# Patient Record
Sex: MALE | Race: WHITE | Employment: FULL TIME | URBAN - METROPOLITAN AREA
[De-identification: names, ages, dates, MRNs, and addresses within clinical notes are randomized per-mention and may not be internally consistent; named-entity substitution may affect disease eponyms.]

---

## 2017-01-24 ENCOUNTER — OFFICE VISIT (OUTPATIENT)
Dept: FAMILY MEDICINE CLINIC | Age: 32
End: 2017-01-24

## 2017-01-24 ENCOUNTER — TELEPHONE (OUTPATIENT)
Dept: FAMILY MEDICINE CLINIC | Age: 32
End: 2017-01-24

## 2017-01-24 VITALS
SYSTOLIC BLOOD PRESSURE: 136 MMHG | HEIGHT: 70 IN | DIASTOLIC BLOOD PRESSURE: 90 MMHG | HEART RATE: 88 BPM | BODY MASS INDEX: 26.74 KG/M2 | OXYGEN SATURATION: 98 % | WEIGHT: 186.8 LBS

## 2017-01-24 DIAGNOSIS — K21.9 GASTROESOPHAGEAL REFLUX DISEASE, ESOPHAGITIS PRESENCE NOT SPECIFIED: ICD-10-CM

## 2017-01-24 DIAGNOSIS — D22.9 ATYPICAL NEVI: ICD-10-CM

## 2017-01-24 DIAGNOSIS — R63.4 WEIGHT LOSS: ICD-10-CM

## 2017-01-24 DIAGNOSIS — F41.9 ANXIETY: Primary | ICD-10-CM

## 2017-01-24 PROCEDURE — 99214 OFFICE O/P EST MOD 30 MIN: CPT | Performed by: FAMILY MEDICINE

## 2017-02-23 ENCOUNTER — OFFICE VISIT (OUTPATIENT)
Dept: FAMILY MEDICINE CLINIC | Age: 32
End: 2017-02-23

## 2017-02-23 VITALS
HEART RATE: 82 BPM | WEIGHT: 179 LBS | OXYGEN SATURATION: 98 % | BODY MASS INDEX: 25.68 KG/M2 | DIASTOLIC BLOOD PRESSURE: 74 MMHG | SYSTOLIC BLOOD PRESSURE: 118 MMHG

## 2017-02-23 DIAGNOSIS — R19.7 DIARRHEA, UNSPECIFIED TYPE: ICD-10-CM

## 2017-02-23 DIAGNOSIS — R00.2 PALPITATIONS: ICD-10-CM

## 2017-02-23 DIAGNOSIS — F41.9 ANXIETY: ICD-10-CM

## 2017-02-23 DIAGNOSIS — K21.9 GASTROESOPHAGEAL REFLUX DISEASE, ESOPHAGITIS PRESENCE NOT SPECIFIED: ICD-10-CM

## 2017-02-23 DIAGNOSIS — F32.89 DEPRESSIVE DISORDER, NOT ELSEWHERE CLASSIFIED: Primary | ICD-10-CM

## 2017-02-23 DIAGNOSIS — F41.0 PANIC DISORDER: ICD-10-CM

## 2017-02-23 PROCEDURE — 99214 OFFICE O/P EST MOD 30 MIN: CPT | Performed by: FAMILY MEDICINE

## 2017-02-23 RX ORDER — HYDROXYZINE HYDROCHLORIDE 25 MG/1
25 TABLET, FILM COATED ORAL 3 TIMES DAILY PRN
Qty: 30 TABLET | Refills: 1 | Status: SHIPPED | OUTPATIENT
Start: 2017-02-23 | End: 2017-08-23

## 2017-02-23 RX ORDER — OMEPRAZOLE 20 MG/1
20 CAPSULE, DELAYED RELEASE ORAL DAILY
Qty: 30 CAPSULE | Refills: 12 | Status: SHIPPED | OUTPATIENT
Start: 2017-02-23 | End: 2017-08-23 | Stop reason: SDUPTHER

## 2017-04-17 ENCOUNTER — TELEPHONE (OUTPATIENT)
Dept: FAMILY MEDICINE CLINIC | Age: 32
End: 2017-04-17

## 2017-05-03 ENCOUNTER — TELEPHONE (OUTPATIENT)
Dept: FAMILY MEDICINE CLINIC | Age: 32
End: 2017-05-03

## 2017-05-03 DIAGNOSIS — Z13.220 SCREENING, LIPID: Primary | ICD-10-CM

## 2017-08-23 ENCOUNTER — OFFICE VISIT (OUTPATIENT)
Dept: FAMILY MEDICINE CLINIC | Age: 32
End: 2017-08-23

## 2017-08-23 VITALS
SYSTOLIC BLOOD PRESSURE: 120 MMHG | DIASTOLIC BLOOD PRESSURE: 70 MMHG | HEART RATE: 75 BPM | BODY MASS INDEX: 19.66 KG/M2 | WEIGHT: 137 LBS | OXYGEN SATURATION: 98 %

## 2017-08-23 DIAGNOSIS — R10.13 EPIGASTRIC PAIN: Primary | ICD-10-CM

## 2017-08-23 DIAGNOSIS — F41.9 ANXIETY: ICD-10-CM

## 2017-08-23 DIAGNOSIS — K21.9 GASTROESOPHAGEAL REFLUX DISEASE WITHOUT ESOPHAGITIS: ICD-10-CM

## 2017-08-23 PROCEDURE — 99214 OFFICE O/P EST MOD 30 MIN: CPT | Performed by: FAMILY MEDICINE

## 2017-08-23 RX ORDER — OMEPRAZOLE 40 MG/1
40 CAPSULE, DELAYED RELEASE ORAL DAILY
Qty: 30 CAPSULE | Refills: 12 | Status: SHIPPED | OUTPATIENT
Start: 2017-08-23 | End: 2017-10-10

## 2017-08-23 RX ORDER — PAROXETINE 10 MG/1
10 TABLET, FILM COATED ORAL DAILY
Qty: 30 TABLET | Refills: 3 | Status: SHIPPED | OUTPATIENT
Start: 2017-08-23 | End: 2017-10-10

## 2017-10-07 LAB
ALBUMIN SERPL-MCNC: 4.3 G/DL (ref 3.5–5.7)
ALP BLD-CCNC: 65 U/L (ref 36–125)
ALT SERPL-CCNC: 15 U/L (ref 7–52)
ANION GAP SERPL CALCULATED.3IONS-SCNC: 8 MMOL/L (ref 3–16)
AST SERPL-CCNC: 16 U/L (ref 13–39)
BASOPHILS ABSOLUTE: 53 /UL (ref 0–200)
BASOPHILS RELATIVE PERCENT: 1 % (ref 0–1)
BILIRUB SERPL-MCNC: 1.1 MG/DL (ref 0–1.5)
BUN BLDV-MCNC: 10 MG/DL (ref 7–25)
CALCIUM SERPL-MCNC: 9.9 MG/DL (ref 8.6–10.3)
CHLORIDE BLD-SCNC: 100 MMOL/L (ref 98–110)
CHOLESTEROL, TOTAL: 147 MG/DL (ref 0–200)
CO2: 32 MMOL/L (ref 21–33)
CREAT SERPL-MCNC: 0.94 MG/DL (ref 0.6–1.3)
EOSINOPHILS ABSOLUTE: 85 /UL (ref 15–500)
EOSINOPHILS RELATIVE PERCENT: 1.6 % (ref 0–8)
GFR, ESTIMATED: >90 SEE NOTE.
GFR, ESTIMATED: >90 SEE NOTE.
GLUCOSE BLD-MCNC: 87 MG/DL (ref 70–100)
HCT VFR BLD CALC: 43.8 % (ref 38.5–50)
HDLC SERPL-MCNC: 37 MG/DL (ref 60–92)
HEMOGLOBIN: 14.8 G/DL (ref 13.2–17.1)
LDL CHOLESTEROL CALCULATED: 85 MG/DL
LYMPHOCYTES ABSOLUTE: 1807 /UL (ref 850–3900)
LYMPHOCYTES RELATIVE PERCENT: 34.1 % (ref 15–45)
MCH RBC QN AUTO: 29.4 PG (ref 27–33)
MCHC RBC AUTO-ENTMCNC: 33.9 G/DL (ref 32–36)
MCV RBC AUTO: 86.7 FL (ref 80–100)
MONOCYTES ABSOLUTE: 419 /UL (ref 200–950)
MONOCYTES RELATIVE PERCENT: 7.9 % (ref 0–12)
NEUTROPHILS ABSOLUTE: 2936 /UL (ref 1500–7800)
NUCLEATED RED BLOOD CELLS: 0 /100 WBC (ref 0–0)
OSMOLALITY CALCULATION: 288 MOSM/KG (ref 278–305)
PDW BLD-RTO: 14.4 % (ref 11–15)
PLATELET # BLD: 199 10E3/UL (ref 140–400)
PMV BLD AUTO: 7.5 FL (ref 7.5–11.5)
POTASSIUM SERPL-SCNC: 4.3 MMOL/L (ref 3.5–5.3)
RBC # BLD: 5.05 10E6/UL (ref 4.2–5.8)
SEGMENTED NEUTROPHILS RELATIVE PERCENT: 55.4 % (ref 40–80)
SODIUM BLD-SCNC: 140 MMOL/L (ref 133–146)
TOTAL PROTEIN: 7.3 G/DL (ref 6.4–8.9)
TRIGL SERPL-MCNC: 123 MG/DL (ref 10–149)
TSH, 3RD GENERATION: 1.81 UIU/ML (ref 0.34–5.6)
WBC # BLD: 5.3 10E3/UL (ref 3.8–10.8)

## 2017-10-09 LAB
ENDOMYSIAL IGA ANTIBODY: NEGATIVE
GLIADIN ANTIBODIES IGA: 5 UNITS (ref 0–19)
GLIADIN ANTIBODIES IGG: 2 UNITS (ref 0–19)
IMMUNOGLOBULIN A, SERUM: 419 MG/DL (ref 82–453)
TRANSGLUTAMINASE IGA: <2 U/ML (ref 0–3)

## 2017-10-10 ENCOUNTER — OFFICE VISIT (OUTPATIENT)
Dept: FAMILY MEDICINE CLINIC | Age: 32
End: 2017-10-10

## 2017-10-10 VITALS
HEART RATE: 69 BPM | DIASTOLIC BLOOD PRESSURE: 76 MMHG | BODY MASS INDEX: 20.66 KG/M2 | WEIGHT: 144 LBS | OXYGEN SATURATION: 98 % | SYSTOLIC BLOOD PRESSURE: 120 MMHG

## 2017-10-10 DIAGNOSIS — F41.9 ANXIETY: Primary | ICD-10-CM

## 2017-10-10 DIAGNOSIS — K21.9 GASTROESOPHAGEAL REFLUX DISEASE WITHOUT ESOPHAGITIS: ICD-10-CM

## 2017-10-10 PROCEDURE — 99213 OFFICE O/P EST LOW 20 MIN: CPT | Performed by: FAMILY MEDICINE

## 2017-10-10 RX ORDER — OMEPRAZOLE 40 MG/1
40 CAPSULE, DELAYED RELEASE ORAL DAILY
Qty: 30 CAPSULE | Refills: 12
Start: 2017-10-10 | End: 2018-08-31 | Stop reason: SDUPTHER

## 2017-10-10 ASSESSMENT — PATIENT HEALTH QUESTIONNAIRE - PHQ9
SUM OF ALL RESPONSES TO PHQ9 QUESTIONS 1 & 2: 2
1. LITTLE INTEREST OR PLEASURE IN DOING THINGS: 1
SUM OF ALL RESPONSES TO PHQ QUESTIONS 1-9: 2
2. FEELING DOWN, DEPRESSED OR HOPELESS: 1

## 2017-10-10 NOTE — PROGRESS NOTES
Subjective:      Patient ID: Omar Peres is a 27 y.o. male. HPI patient presents today for his 1 month follow up on medications. Still taking omeprazole, but he is not sure if it's working or his duodenitis is just improving. Only has stomach discomfort once or twice a week. Slowly figuring out what foods aggravate him - he avoids greasy/too spicy foods. Had a cupcake recently that did not bother him. His appetite has improved. Says bowels are okay, no diarrhea/constipation recently. He wants to know if there are specific probiotics we could recommend. Got paxil filled in August, but never started taking it. He says he is nervous about taking it after reading an article that it can cause or worsen duodenitis so would like to discuss that possibility today. His sister has been on sertraline, he would prefer starting that because she has been doing well on it. He does report regular anxiety/agitation but it hasn't been bugging him more than usual. No new stressors, still worried about bills but feels less worried about his health as he has learned more about it. Review of Systems    Objective:   Physical Exam   Psychiatric: His speech is normal and behavior is normal. Judgment and thought content normal. His mood appears anxious. Cognition and memory are normal.     Body mass index is 20.66 kg/m². Vitals:    10/10/17 1124   BP: 120/76   Site: Left Arm   Position: Sitting   Cuff Size: Large Adult   Pulse: 69   SpO2: 98%   Weight: 144 lb (65.3 kg)     Wt Readings from Last 3 Encounters:   10/10/17 144 lb (65.3 kg)   08/23/17 137 lb (62.1 kg)   02/23/17 179 lb (81.2 kg)       GENERAL:Alert and oriented x 4 NAD, normal appearing weight, well hydrated, well developed. PHQ-9 Total Score: 2 (10/10/2017 11:27 AM)        Assessment:       Mathieu Tucker was seen today for follow-up. Diagnoses and all orders for this visit:    Anxiety  -     sertraline (ZOLOFT) 50 MG tablet;  Take 1 tablet by mouth daily  Start sertraline  RTo 1 month     Gastroesophageal reflux disease without esophagitis  -     omeprazole (PRILOSEC) 40 MG delayed release capsule; Take 1 capsule by mouth daily  -Stable, continue current medications. Discussed probiotics, trial one for 3-4 weeks and if not helpful can try a dif one. Discussed sometimes needs to try dif ones before finding one that works well.     Note per Jax Joy, MS3 student with corrections and edits per Neftaly Valdivia MD.  I agree with entirety of note and was present and performed history and physical.

## 2017-10-10 NOTE — PROGRESS NOTES
Subjective:      Patient ID: Donn Montoya is a 27 y.o. male. HPI patient presents today for his 1 month follow up on medications.     Review of Systems    Objective:   Physical Exam    Assessment:      ***      Plan:      ***

## 2018-01-10 ENCOUNTER — OFFICE VISIT (OUTPATIENT)
Dept: FAMILY MEDICINE CLINIC | Age: 33
End: 2018-01-10

## 2018-01-10 VITALS
SYSTOLIC BLOOD PRESSURE: 116 MMHG | WEIGHT: 150 LBS | BODY MASS INDEX: 21.52 KG/M2 | HEART RATE: 79 BPM | DIASTOLIC BLOOD PRESSURE: 78 MMHG | OXYGEN SATURATION: 98 %

## 2018-01-10 DIAGNOSIS — K21.9 GASTROESOPHAGEAL REFLUX DISEASE WITHOUT ESOPHAGITIS: Primary | ICD-10-CM

## 2018-01-10 DIAGNOSIS — F41.9 ANXIETY: ICD-10-CM

## 2018-01-10 PROCEDURE — 99213 OFFICE O/P EST LOW 20 MIN: CPT | Performed by: FAMILY MEDICINE

## 2018-07-07 ENCOUNTER — OFFICE VISIT (OUTPATIENT)
Dept: FAMILY MEDICINE CLINIC | Age: 33
End: 2018-07-07

## 2018-07-07 VITALS
BODY MASS INDEX: 21.78 KG/M2 | SYSTOLIC BLOOD PRESSURE: 102 MMHG | DIASTOLIC BLOOD PRESSURE: 70 MMHG | WEIGHT: 151.8 LBS | TEMPERATURE: 98.3 F

## 2018-07-07 DIAGNOSIS — L02.92 BOIL: Primary | ICD-10-CM

## 2018-07-07 DIAGNOSIS — F41.9 ANXIETY: ICD-10-CM

## 2018-07-07 PROCEDURE — 99213 OFFICE O/P EST LOW 20 MIN: CPT | Performed by: FAMILY MEDICINE

## 2018-07-07 RX ORDER — CLINDAMYCIN HYDROCHLORIDE 150 MG/1
150 CAPSULE ORAL 3 TIMES DAILY
Qty: 21 CAPSULE | Refills: 0 | Status: SHIPPED | OUTPATIENT
Start: 2018-07-07 | End: 2018-07-14

## 2018-07-07 NOTE — PROGRESS NOTES
Subjective:      Patient ID: Yessenia Aggarwal is a 26 y.o. male. Patient presents for acute medical problem-infection on back . Medical assistant notes reviewed. HPI Patient has a cyst on his back that has been there for a long time. Patient states the area is sensitive to touch, red, swollen, and has some green/yellowish colored discharge coming from it for the past week. Patient has significant anxiety about taking medications in general.    Review of Systems     Allergies   Allergen Reactions    Amoxicillin-Pot Clavulanate     Citalopram      Jittery, sweating, felt \"weird\"    Midrin [Apap-Isometheptene-Dichloral]     Penicillins          Objective:   Physical Exam   Constitutional: He appears well-developed and well-nourished. No distress. Neurological: He is alert. Skin:   Left mid back with 3 x 4 cm carbuncle that is already having purulent drainage. New dressing was applied to the affected area       Assessment:      Dylon Eli was seen today for mass. Diagnoses and all orders for this visit:    Boil    Anxiety    Other orders  -     clindamycin (CLEOCIN) 150 MG capsule;  Take 1 capsule by mouth 3 times daily for 7 days            Plan:      Warm compress to the affected area 3 times a day with dressing  Discussed surgical excision of the area in the next 4-6 weeks and patient is to call back for appointment with general surgeon  Informational handout provided  RTC PRN

## 2018-07-17 ENCOUNTER — OFFICE VISIT (OUTPATIENT)
Dept: FAMILY MEDICINE CLINIC | Age: 33
End: 2018-07-17

## 2018-07-17 VITALS
HEART RATE: 67 BPM | BODY MASS INDEX: 22.46 KG/M2 | DIASTOLIC BLOOD PRESSURE: 78 MMHG | WEIGHT: 148.2 LBS | HEIGHT: 68 IN | SYSTOLIC BLOOD PRESSURE: 110 MMHG | OXYGEN SATURATION: 98 %

## 2018-07-17 DIAGNOSIS — L02.212 ABSCESS OF BACK: ICD-10-CM

## 2018-07-17 DIAGNOSIS — F41.9 ANXIETY: Primary | ICD-10-CM

## 2018-07-17 DIAGNOSIS — K58.9 IRRITABLE BOWEL SYNDROME, UNSPECIFIED TYPE: ICD-10-CM

## 2018-07-17 PROCEDURE — 99214 OFFICE O/P EST MOD 30 MIN: CPT | Performed by: FAMILY MEDICINE

## 2018-07-17 NOTE — PATIENT INSTRUCTIONS
Medications are not the best treatments for insomnia and can have many side effects including sleep walking/eating/driving, daytime sleepiness, addiction and dependence, increased risk of falls and increased risk of dementia. Mindfulness and Meditation have been shown to be more effective than other treatments for insomnia, including medication. It also improves mood symptoms like anxiety and depression. Here are some websites to help you get started with a natural way to fall asleep and feel better! Http://[employer]ath[employer]. lucierna    Https://HiptypeuseFreeATM. lucierna  (free 8 week course)    Www.Pharminex. lucierna  (also has a smartphone meg)    Www.Ahead. lucierna  (also has a smartphone meg)      Dr. Ag Fontanez in [address] for Insomnia  [facility]. [address]  [place], [address] [place] [address]  [phone]    [address] [place] [address] for [address]  [phone]

## 2018-07-17 NOTE — PROGRESS NOTES
Subjective:      Patient ID: Alofnso Moreno is a 26 y.o. male. HPI Patient presents today for stomach pain that comes and goes. States stomach was sensitive afterwards. States  symptoms are currently not bothering him, would like to see what he can do to prevent them in the future. Here with stomach pain and anxiety. Also would like cyst on back rechecked. Here with stomach pain.  few weeks ago had sudden severe pain in epigastric, felt like got punched, lasted few sec and then gone and fine since.  had eaten more tomatoes on his chipolte than normal and knows that flares up his stomach sx but was concerned as the pain was not like normal flare ups. Has not had any since. Also concerned about orgasm, states had few episodes a week ago where orgasm would build but even after climax would continue to build where normally would gradually go away after climax was just continue to build and then abruptly went away. Was able to reach climax and erection went away. Just concerned as it wasn't like his normal orgasm. Also wants to recheck the cyst on back, states keeps draining. Never took abx as doesn't like to take medication if doesn't have to but wants to make sure it is getting better. Review of Systems    Objective:   Physical Exam  Vitals:    07/17/18 0936   BP: 110/78   Site: Left Arm   Position: Sitting   Cuff Size: Small Adult   Pulse: 67   SpO2: 98%   Weight: 148 lb 3.2 oz (67.2 kg)   Height: 5' 8.25\" (1.734 m)     Wt Readings from Last 3 Encounters:   07/17/18 148 lb 3.2 oz (67.2 kg)   07/07/18 151 lb 12.8 oz (68.9 kg)   01/10/18 150 lb (68 kg)     Body mass index is 22.37 kg/m². Alert and oriented x 4 NAD, normal appearing weight, well hydrated, well developed. Mid back with large soft area with opening at bottom, able to express sig amount of purulent material and some bloody drainage. Dressed with tegaderm and gauze. Mild surrounding erythema.       Assessment:        Ibis Knapp

## 2018-08-31 ENCOUNTER — OFFICE VISIT (OUTPATIENT)
Dept: FAMILY MEDICINE CLINIC | Age: 33
End: 2018-08-31

## 2018-08-31 VITALS
DIASTOLIC BLOOD PRESSURE: 80 MMHG | RESPIRATION RATE: 12 BRPM | SYSTOLIC BLOOD PRESSURE: 120 MMHG | WEIGHT: 149.5 LBS | HEART RATE: 78 BPM | BODY MASS INDEX: 22.57 KG/M2

## 2018-08-31 DIAGNOSIS — K58.9 IRRITABLE BOWEL SYNDROME, UNSPECIFIED TYPE: ICD-10-CM

## 2018-08-31 DIAGNOSIS — F41.0 PANIC DISORDER: ICD-10-CM

## 2018-08-31 DIAGNOSIS — K29.50 CHRONIC GASTRITIS WITHOUT BLEEDING, UNSPECIFIED GASTRITIS TYPE: ICD-10-CM

## 2018-08-31 DIAGNOSIS — L72.3 SEBACEOUS CYST: Primary | ICD-10-CM

## 2018-08-31 PROCEDURE — 99214 OFFICE O/P EST MOD 30 MIN: CPT | Performed by: FAMILY MEDICINE

## 2018-08-31 RX ORDER — OMEPRAZOLE 40 MG/1
40 CAPSULE, DELAYED RELEASE ORAL DAILY
Qty: 30 CAPSULE | Refills: 12 | Status: SHIPPED | OUTPATIENT
Start: 2018-08-31 | End: 2019-07-12

## 2018-08-31 NOTE — PATIENT INSTRUCTIONS
Patient Education        Epidermoid Cyst: Care Instructions  Your Care Instructions  An epidermoid (say \"ob-krc-RDP-fernando\") cyst is a lump just under the skin. These cysts can form when a hair follicle becomes blocked. They are common in acne and may occur on the face, neck, back, and genitals. However, they can form anywhere on the body. These cysts are not cancer and do not lead to cancer. They tend not to hurt, but they can sometimes become swollen and painful. They also may break open (rupture) and cause scarring. These cysts sometimes do not cause problems and may not need treatment. If you have a cyst that is swollen and hurts, your doctor may inject it with a medicine to help it heal. But it is more likely that a painful cyst will need to be removed. Your doctor will give you a shot of numbing medicine and cut into the cyst to drain it or remove it. This makes the symptoms go away. Follow-up care is a key part of your treatment and safety. Be sure to make and go to all appointments, and call your doctor if you are having problems. It's also a good idea to know your test results and keep a list of the medicines you take. How can you care for yourself at home? · Do not squeeze the cyst or poke it with a needle to open it. This can cause swelling, redness, and infection. · Always have a doctor look at any new lumps you get to make sure that they are not serious. When should you call for help? Watch closely for changes in your health, and be sure to contact your doctor if:    · You have a fever, redness, or swelling after you get a shot of medicine in the cyst.     · You see or feel a new lump on your skin. Where can you learn more? Go to https://Ethos NetworkspeCrossCurrent.Furnish.co.uk. org and sign in to your sifonr account. Enter I381 in the [employer] box to learn more about \"Epidermoid Cyst: Care Instructions. \"     If you do not have an account, please click on the \"Sign Up Now\" link.   Current as of: October 5, 2017  Content Version: 11.7  © 3132-1495 Client[employer]. Care instructions adapted under license by [facility] ([facility]). If you have questions about a medical condition or this instruction, always ask your healthcare professional. [address] any warranty or liability for your use of this information. Patient Education        Gastroesophageal Reflux Disease (GERD): Care Instructions  Your Care Instructions    Gastroesophageal reflux disease (GERD) is the backward flow of stomach acid into the esophagus. The esophagus is the tube that leads from your throat to your stomach. A one-way valve prevents the stomach acid from moving up into this tube. When you have GERD, this valve does not close tightly enough. If you have mild GERD symptoms including heartburn, you may be able to control the problem with antacids or over-the-counter medicine. Changing your diet, losing weight, and making other lifestyle changes can also help reduce symptoms. Follow-up care is a key part of your treatment and safety. Be sure to make and go to all appointments, and call your doctor if you are having problems. It's also a good idea to know your test results and keep a list of the medicines you take. How can you care for yourself at home? · Take your medicines exactly as prescribed. Call your doctor if you think you are having a problem with your medicine. · Your doctor may recommend over-the-counter medicine. For mild or occasional indigestion, antacids, such as Tums, Gaviscon, Mylanta, or Maalox, may help. Your doctor also may recommend over-the-counter acid reducers, such as Pepcid AC, Tagamet HB, Zantac 75, or Prilosec. Read and follow all instructions on the label. If you use these medicines often, talk with your doctor. · Change your eating habits. ¨ It's best to eat several small meals instead of two or three large meals.   ¨ After you eat, wait 2 to 3 hours before you lie

## 2018-08-31 NOTE — PROGRESS NOTES
Subjective:      Patient ID: Alicia Burkitt is a 26 y.o. male. CC: Patient presents for re-evaluation of chronic health problems including cyst on his back but he also was to discuss his persistent abdominal symptoms and weight loss. Yusra Wang HPI Pt states he is here for a follow up on a cyst in his back. Pt states he has notice some discharge. Patient does feel the cyst is infected but would like excision of the site future. He's been having difficulty with abdominal symptoms and he knows some of this is related to underlying panic anxiety disorder as well. He has noted that corn seems to irritate his GI tract more than anything else. He's also noted tomato products but he can drink caffeine with no issues. He has noted that soda bothers him. Patient states he was told that he should have an EGD and further testing but his panic anxiety disorder does not allow this.     Review of Systems  Patient Active Problem List   Diagnosis    Depressive disorder, not elsewhere classified    Esophageal reflux    Anxiety    Panic disorder    IBS (irritable bowel syndrome)       Outpatient Prescriptions Marked as Taking for the 8/31/18 encounter (Office Visit) with Rah Brasher MD   Medication Sig Dispense Refill    omeprazole (PRILOSEC) 40 MG delayed release capsule Take 1 capsule by mouth daily 30 capsule 12       Allergies   Allergen Reactions    Amoxicillin-Pot Clavulanate     Citalopram      Jittery, sweating, felt \"weird\"    Midrin [Apap-Isometheptene-Dichloral]     Penicillins        Social History   Substance Use Topics    Smoking status: Current Every Day Smoker    Smokeless tobacco: Former User     Types: Chew    Alcohol use 3.6 oz/week     6 Cans of beer per week       /80 (Site: Right Arm, Position: Sitting, Cuff Size: Medium Adult)   Pulse 78   Resp 12   Wt 149 lb 8 oz (67.8 kg)   BMI 22.57 kg/m²     Objective:   Physical Exam   Constitutional: He appears well-developed and well-nourished. He is cooperative. No distress. Abdominal: Soft. Normal appearance and bowel sounds are normal. He exhibits no distension and no mass. There is no hepatosplenomegaly. There is no tenderness. There is no rigidity, no rebound, no guarding and no CVA tenderness. No hernia. Neurological: He is alert. Skin:   Left scapular area with a 2 x 3 cm sebaceous cyst scarring in the lower aspect   Psychiatric: His speech is normal and behavior is normal. Judgment and thought content normal. His mood appears anxious. Cognition and memory are normal. He does not exhibit a depressed mood. Assessment:      Ailyn Rizo was seen today for follow-up. Diagnoses and all orders for this visit:    Sebaceous cyst    Chronic gastritis without bleeding, unspecified gastritis type    Panic disorder    Irritable bowel syndrome, unspecified type    Other orders  -     omeprazole (PRILOSEC) 40 MG delayed release capsule; Take 1 capsule by mouth daily            Plan:      Sebaceous cyst needs to be excised and this will be arranged in the near future  Maintain PPI medication since this helped amount  Discussed trying a corn free diet  Agreed that the next that would be an EGD  Total time of consultation 25 minutes. Please note that this chart was generated using Dragon dictation software. Although every effort was made to ensure the accuracy of this automated transcription, some errors in transcription may have occurred.

## 2018-11-13 ENCOUNTER — TELEPHONE (OUTPATIENT)
Dept: ADMINISTRATIVE | Age: 33
End: 2018-11-13

## 2018-11-13 DIAGNOSIS — L72.3 SEBACEOUS CYST: Primary | ICD-10-CM

## 2018-11-13 RX ORDER — CEPHALEXIN 500 MG/1
500 CAPSULE ORAL 3 TIMES DAILY
Qty: 21 CAPSULE | Refills: 0 | Status: SHIPPED | OUTPATIENT
Start: 2018-11-13 | End: 2018-12-11

## 2018-11-13 NOTE — TELEPHONE ENCOUNTER
Pt was seen previously for cyst on back. Patient states cyst is back and believes infected. Patient would like to know who Dr Steffany Gaines Referred him to so he can have removed . Needs contact information of surgeon.  Please advise

## 2018-11-16 ENCOUNTER — OFFICE VISIT (OUTPATIENT)
Dept: SURGERY | Age: 33
End: 2018-11-16
Payer: COMMERCIAL

## 2018-11-16 VITALS
SYSTOLIC BLOOD PRESSURE: 129 MMHG | HEIGHT: 70 IN | BODY MASS INDEX: 21.47 KG/M2 | DIASTOLIC BLOOD PRESSURE: 88 MMHG | WEIGHT: 150 LBS

## 2018-11-16 DIAGNOSIS — L08.9 INFECTED SEBACEOUS CYST: Primary | ICD-10-CM

## 2018-11-16 DIAGNOSIS — L72.3 INFECTED SEBACEOUS CYST: Primary | ICD-10-CM

## 2018-11-16 PROCEDURE — 10060 I&D ABSCESS SIMPLE/SINGLE: CPT | Performed by: SURGERY

## 2018-11-16 PROCEDURE — 99999 PR OFFICE/OUTPT VISIT,PROCEDURE ONLY: CPT | Performed by: SURGERY

## 2018-11-16 ASSESSMENT — ENCOUNTER SYMPTOMS
ALLERGIC/IMMUNOLOGIC NEGATIVE: 1
RESPIRATORY NEGATIVE: 1
EYES NEGATIVE: 1
GASTROINTESTINAL NEGATIVE: 1

## 2018-11-30 ENCOUNTER — PROCEDURE VISIT (OUTPATIENT)
Dept: SURGERY | Age: 33
End: 2018-11-30
Payer: COMMERCIAL

## 2018-11-30 VITALS — SYSTOLIC BLOOD PRESSURE: 124 MMHG | DIASTOLIC BLOOD PRESSURE: 82 MMHG

## 2018-11-30 DIAGNOSIS — L08.9 INFECTED SEBACEOUS CYST: Primary | ICD-10-CM

## 2018-11-30 DIAGNOSIS — L72.3 INFECTED SEBACEOUS CYST: Primary | ICD-10-CM

## 2018-11-30 PROCEDURE — 99212 OFFICE O/P EST SF 10 MIN: CPT | Performed by: SURGERY

## 2018-11-30 NOTE — PROGRESS NOTES
Subjective:      Chief complaint-sebaceous cyst    Patient ID: HPIRhea Arthur is a 26 y.o. male is here for follow-up of a sebaceous cyst    HPI    Review of Systems    Objective:   Physical Exam   Constitutional: He is oriented to person, place, and time. He appears well-developed and well-nourished. Pulmonary/Chest: Effort normal. No respiratory distress. Neurological: He is alert and oriented to person, place, and time. Skin: Skin is dry. The I&D site is still open. Purulent fluid was able to be expressed. Assessment:      78-year-old male seen in follow-up or an infected sebaceous cyst.  We were planning for excision of the cyst wall today but there is still residual infection. A significant amount of purulent and sebaceous debris was expressed. The patient is resistant to the idea of taking oral antibiotics. Plan:      Patient and his mother were instructed to express fluid from the wound at least every 3 days. We will plan for excision of the cyst wall in about 2 weeks. It would be best to proceed under MAC with local because of the patient's anxiety issues.         Marj Abarca MD

## 2018-11-30 NOTE — LETTER
[facility]  [address]  Phone: [phone]  Fax: [phone]    Ricardo Handley MD        November 30, 2018     Patient: Elder Alicia   YOB: 1985   Date of Visit: 11/30/2018       To Whom It May Concern: Reyes Mario was in the office today for a surgical procedure. Please excuse him from work today. If you have any questions or concerns, please don't hesitate to call.     Sincerely,        Ricardo Handley MD

## 2018-12-11 NOTE — PROGRESS NOTES
Pt.states he cannot tolerate being without water from 2400 until 1400 due to issues with IBS. Checked with Dr. Jesus Ding for pt.to have water until 0800, from 5582-4661 may have sips of water and NPO starting 1000. This is with the understanding that if surgery would need to be moved up this may not be possible due to drinking water. Pt informed.

## 2018-12-18 ENCOUNTER — ANESTHESIA EVENT (OUTPATIENT)
Dept: OPERATING ROOM | Age: 33
End: 2018-12-18
Payer: COMMERCIAL

## 2018-12-18 ENCOUNTER — HOSPITAL ENCOUNTER (OUTPATIENT)
Age: 33
Setting detail: OUTPATIENT SURGERY
Discharge: HOME | End: 2018-12-18
Attending: SURGERY | Admitting: SURGERY
Payer: COMMERCIAL

## 2018-12-18 ENCOUNTER — ANESTHESIA (OUTPATIENT)
Dept: OPERATING ROOM | Age: 33
End: 2018-12-18
Payer: COMMERCIAL

## 2018-12-18 VITALS — TEMPERATURE: 98.6 F | OXYGEN SATURATION: 99 % | DIASTOLIC BLOOD PRESSURE: 53 MMHG | SYSTOLIC BLOOD PRESSURE: 95 MMHG

## 2018-12-18 VITALS
SYSTOLIC BLOOD PRESSURE: 128 MMHG | DIASTOLIC BLOOD PRESSURE: 69 MMHG | HEIGHT: 70 IN | TEMPERATURE: 97.5 F | RESPIRATION RATE: 16 BRPM | HEART RATE: 99 BPM | BODY MASS INDEX: 22.42 KG/M2 | OXYGEN SATURATION: 97 % | WEIGHT: 156.6 LBS

## 2018-12-18 DIAGNOSIS — L72.3 SEBACEOUS CYST: Primary | ICD-10-CM

## 2018-12-18 PROCEDURE — 7100000010 HC PHASE II RECOVERY - FIRST 15 MIN: Performed by: SURGERY

## 2018-12-18 PROCEDURE — 2580000003 HC RX 258

## 2018-12-18 PROCEDURE — 6360000002 HC RX W HCPCS: Performed by: SURGERY

## 2018-12-18 PROCEDURE — 2500000003 HC RX 250 WO HCPCS: Performed by: SURGERY

## 2018-12-18 PROCEDURE — 7100000000 HC PACU RECOVERY - FIRST 15 MIN: Performed by: SURGERY

## 2018-12-18 PROCEDURE — 11404 EXC TR-EXT B9+MARG 3.1-4 CM: CPT | Performed by: SURGERY

## 2018-12-18 PROCEDURE — 3700000001 HC ADD 15 MINUTES (ANESTHESIA): Performed by: SURGERY

## 2018-12-18 PROCEDURE — 2580000003 HC RX 258: Performed by: NURSE ANESTHETIST, CERTIFIED REGISTERED

## 2018-12-18 PROCEDURE — 2709999900 HC NON-CHARGEABLE SUPPLY: Performed by: SURGERY

## 2018-12-18 PROCEDURE — 2580000003 HC RX 258: Performed by: SURGERY

## 2018-12-18 PROCEDURE — 3600000002 HC SURGERY LEVEL 2 BASE: Performed by: SURGERY

## 2018-12-18 PROCEDURE — 7100000001 HC PACU RECOVERY - ADDTL 15 MIN: Performed by: SURGERY

## 2018-12-18 PROCEDURE — 12032 INTMD RPR S/A/T/EXT 2.6-7.5: CPT | Performed by: SURGERY

## 2018-12-18 PROCEDURE — 2500000003 HC RX 250 WO HCPCS: Performed by: NURSE ANESTHETIST, CERTIFIED REGISTERED

## 2018-12-18 PROCEDURE — 88304 TISSUE EXAM BY PATHOLOGIST: CPT

## 2018-12-18 PROCEDURE — 7100000011 HC PHASE II RECOVERY - ADDTL 15 MIN: Performed by: SURGERY

## 2018-12-18 PROCEDURE — 3700000000 HC ANESTHESIA ATTENDED CARE: Performed by: SURGERY

## 2018-12-18 PROCEDURE — 3600000012 HC SURGERY LEVEL 2 ADDTL 15MIN: Performed by: SURGERY

## 2018-12-18 PROCEDURE — 6360000002 HC RX W HCPCS: Performed by: NURSE ANESTHETIST, CERTIFIED REGISTERED

## 2018-12-18 RX ORDER — MAGNESIUM HYDROXIDE 1200 MG/15ML
LIQUID ORAL CONTINUOUS PRN
Status: DISCONTINUED | OUTPATIENT
Start: 2018-12-18 | End: 2018-12-18 | Stop reason: HOSPADM

## 2018-12-18 RX ORDER — SODIUM CHLORIDE, SODIUM LACTATE, POTASSIUM CHLORIDE, CALCIUM CHLORIDE 600; 310; 30; 20 MG/100ML; MG/100ML; MG/100ML; MG/100ML
INJECTION, SOLUTION INTRAVENOUS CONTINUOUS PRN
Status: DISCONTINUED | OUTPATIENT
Start: 2018-12-18 | End: 2018-12-18 | Stop reason: SDUPTHER

## 2018-12-18 RX ORDER — ACETAMINOPHEN 650 MG
TABLET, EXTENDED RELEASE ORAL
Status: COMPLETED | OUTPATIENT
Start: 2018-12-18 | End: 2018-12-18

## 2018-12-18 RX ORDER — LIDOCAINE HYDROCHLORIDE 20 MG/ML
INJECTION, SOLUTION EPIDURAL; INFILTRATION; INTRACAUDAL; PERINEURAL PRN
Status: DISCONTINUED | OUTPATIENT
Start: 2018-12-18 | End: 2018-12-18 | Stop reason: SDUPTHER

## 2018-12-18 RX ORDER — SODIUM CHLORIDE 9 MG/ML
INJECTION, SOLUTION INTRAVENOUS
Status: COMPLETED
Start: 2018-12-18 | End: 2018-12-18

## 2018-12-18 RX ORDER — MIDAZOLAM HYDROCHLORIDE 1 MG/ML
INJECTION INTRAMUSCULAR; INTRAVENOUS PRN
Status: DISCONTINUED | OUTPATIENT
Start: 2018-12-18 | End: 2018-12-18 | Stop reason: SDUPTHER

## 2018-12-18 RX ORDER — LIDOCAINE HYDROCHLORIDE 10 MG/ML
INJECTION, SOLUTION INFILTRATION; PERINEURAL
Status: COMPLETED | OUTPATIENT
Start: 2018-12-18 | End: 2018-12-18

## 2018-12-18 RX ORDER — SODIUM CHLORIDE 9 MG/ML
INJECTION, SOLUTION INTRAVENOUS
Status: DISCONTINUED
Start: 2018-12-18 | End: 2018-12-18 | Stop reason: HOSPADM

## 2018-12-18 RX ORDER — CEFAZOLIN SODIUM 2 G/100ML
2 INJECTION, SOLUTION INTRAVENOUS ONCE
Status: COMPLETED | OUTPATIENT
Start: 2018-12-18 | End: 2018-12-18

## 2018-12-18 RX ORDER — PROPOFOL 10 MG/ML
INJECTION, EMULSION INTRAVENOUS PRN
Status: DISCONTINUED | OUTPATIENT
Start: 2018-12-18 | End: 2018-12-18 | Stop reason: SDUPTHER

## 2018-12-18 RX ORDER — HYDROCODONE BITARTRATE AND ACETAMINOPHEN 5; 325 MG/1; MG/1
1-2 TABLET ORAL EVERY 4 HOURS PRN
Qty: 20 TABLET | Refills: 0 | Status: SHIPPED | OUTPATIENT
Start: 2018-12-18 | End: 2018-12-23

## 2018-12-18 RX ADMIN — SODIUM CHLORIDE: 9 INJECTION, SOLUTION INTRAVENOUS at 15:22

## 2018-12-18 RX ADMIN — PROPOFOL 50 MG: 10 INJECTION, EMULSION INTRAVENOUS at 14:12

## 2018-12-18 RX ADMIN — PROPOFOL 50 MG: 10 INJECTION, EMULSION INTRAVENOUS at 14:09

## 2018-12-18 RX ADMIN — SODIUM CHLORIDE, POTASSIUM CHLORIDE, SODIUM LACTATE AND CALCIUM CHLORIDE: 600; 310; 30; 20 INJECTION, SOLUTION INTRAVENOUS at 14:00

## 2018-12-18 RX ADMIN — LIDOCAINE HYDROCHLORIDE 80 MG: 20 INJECTION, SOLUTION EPIDURAL; INFILTRATION; INTRACAUDAL; PERINEURAL at 14:08

## 2018-12-18 RX ADMIN — PROPOFOL 50 MG: 10 INJECTION, EMULSION INTRAVENOUS at 14:17

## 2018-12-18 RX ADMIN — CEFAZOLIN SODIUM 2 G: 2 INJECTION, SOLUTION INTRAVENOUS at 13:58

## 2018-12-18 RX ADMIN — MIDAZOLAM HYDROCHLORIDE 2 MG: 1 INJECTION, SOLUTION INTRAMUSCULAR; INTRAVENOUS at 14:04

## 2018-12-18 ASSESSMENT — PULMONARY FUNCTION TESTS
PIF_VALUE: 0
PIF_VALUE: 1
PIF_VALUE: 0
PIF_VALUE: 1
PIF_VALUE: 0
PIF_VALUE: 1
PIF_VALUE: 0

## 2018-12-18 ASSESSMENT — PAIN - FUNCTIONAL ASSESSMENT: PAIN_FUNCTIONAL_ASSESSMENT: 0-10

## 2018-12-18 NOTE — LETTER
[address] [facility]  Phone: [phone]    No name on file. December 18, 2018     Patient: Ellis Or   YOB: 1985   Date of Visit: 11/30/2018       To Whom it May Concern:    Loralyn Severs was present for sons procedure today with Dr. Pasqual Simmonds. If you have any questions or concerns, please don't hesitate to call.     Sincerely,         Elisha Bending, RN Dr. Pasqual Simmonds

## 2018-12-18 NOTE — ANESTHESIA PRE PROCEDURE
Pulmonary:Negative Pulmonary ROS and normal exam                               Cardiovascular:Negative CV ROS  Exercise tolerance: good (>4 METS),                     Neuro/Psych:               GI/Hepatic/Renal:   (+) GERD:,           Endo/Other:                     Abdominal:           Vascular:                                        Anesthesia Plan      MAC     ASA 2       Induction: intravenous. MIPS: Prophylactic antiemetics administered. Anesthetic plan and risks discussed with patient. Plan discussed with CRNA.                   Carly Lee MD   12/18/2018

## 2018-12-18 NOTE — PROGRESS NOTES
VSS, Phase 1 discharge criteria met--seen by anesthesia. Awaiting open spot in [facility].

## 2018-12-27 NOTE — H&P
Laci Tilley is an 26 y.o.  male. Past Medical History:   Diagnosis Date    Anxiety     GERD (gastroesophageal reflux disease)     IBS (irritable bowel syndrome)        Allergies: Allergies   Allergen Reactions    Amoxicillin-Pot Clavulanate      Doesn't remember rx    Citalopram      Jittery, sweating, felt \"weird\"    Midrin [Apap-Isometheptene-Dichloral]      Doesn't remember rx    Penicillins      Doesn't remember rx       Active Problems:    Sebaceous cyst  Resolved Problems:    * No resolved hospital problems. *    Blood pressure 128/69, pulse 99, temperature 97.5 °F (36.4 °C), temperature source Temporal, resp. rate 16, height 5' 10\" (1.778 m), weight 156 lb 9.6 oz (71 kg), SpO2 97 %. Review of Systems    Physical Exam   Cardiovascular: Normal rate and regular rhythm.     Pulmonary/Chest: Effort normal and breath sounds normal.       Assessment:  Sebaceous cyst of the back    Plan:  Excision of sebaceous cyst    Berry Monzon MD  12/27/2018

## 2019-01-02 ENCOUNTER — OFFICE VISIT (OUTPATIENT)
Dept: SURGERY | Age: 34
End: 2019-01-02

## 2019-01-02 VITALS — DIASTOLIC BLOOD PRESSURE: 88 MMHG | WEIGHT: 158 LBS | BODY MASS INDEX: 22.67 KG/M2 | SYSTOLIC BLOOD PRESSURE: 129 MMHG

## 2019-01-02 DIAGNOSIS — L72.3 INFECTED SEBACEOUS CYST: Primary | ICD-10-CM

## 2019-01-02 DIAGNOSIS — L08.9 INFECTED SEBACEOUS CYST: Primary | ICD-10-CM

## 2019-01-02 PROCEDURE — 99024 POSTOP FOLLOW-UP VISIT: CPT | Performed by: SURGERY

## 2019-05-10 ENCOUNTER — NURSE TRIAGE (OUTPATIENT)
Dept: OTHER | Facility: CLINIC | Age: 34
End: 2019-05-10

## 2019-05-10 NOTE — TELEPHONE ENCOUNTER
Reason for Disposition   Health Information question, no triage required and triager able to answer question    Protocols used: INFORMATION ONLY CALL-ADULT-AH    Patient not currently having symptoms. Pt reported having them last night, patient seeking appointment with PCP, left message for [facility] to call back.

## 2019-05-14 ENCOUNTER — OFFICE VISIT (OUTPATIENT)
Dept: FAMILY MEDICINE CLINIC | Age: 34
End: 2019-05-14
Payer: COMMERCIAL

## 2019-05-14 VITALS
BODY MASS INDEX: 25.25 KG/M2 | DIASTOLIC BLOOD PRESSURE: 84 MMHG | HEART RATE: 87 BPM | WEIGHT: 176 LBS | OXYGEN SATURATION: 98 % | SYSTOLIC BLOOD PRESSURE: 122 MMHG

## 2019-05-14 DIAGNOSIS — R00.2 PALPITATIONS: Primary | ICD-10-CM

## 2019-05-14 DIAGNOSIS — K21.9 GASTROESOPHAGEAL REFLUX DISEASE WITHOUT ESOPHAGITIS: ICD-10-CM

## 2019-05-14 PROCEDURE — 99213 OFFICE O/P EST LOW 20 MIN: CPT | Performed by: FAMILY MEDICINE

## 2019-05-14 PROCEDURE — 93000 ELECTROCARDIOGRAM COMPLETE: CPT | Performed by: FAMILY MEDICINE

## 2019-05-14 NOTE — PROGRESS NOTES
Subjective:      Patient ID: Vj Byrd is a 26 y.o. male. HPI   Heart Palpitations & Chest Pain: 5 days ago, felt heart racing for about 5 seconds while sitting on the cough watching TV after dinner. This has previously happened 2 months ago, same symptoms and lasted about 5 seconds. Both times were about 15-20 minutes after eating dinner (once [place] and the other spaghetti and meatballs). Not in an anxiety-provoking environment during either time. Denies shortness of breath, air hunger, reflux, chest pain, symptoms with exertion, nausea, vomiting, syncope. Denies association with position or sensation of pounding in neck. Endorses sense of panic after the episodes, which he addressed by taking a few deep breaths. Had not drank more caffeine than usual on these days. Other than those two times, never happened before. No history of structural heart problems or arrhythmia. Duodenitis: takes omeprazole 40 mg daily. Symptoms would be abdominal pain and bloating. Symptoms have been mostly resolved for a few months but still takes PPI daily. Acknowledges high level of anxiety at baseline, says its \"hit or miss\" day-to-day. No new medications, OTC meds, supplements. Drinks 1-2 cups of coffee per day. Patient's medications, allergies, past medical, surgical, social and family histories were reviewed and updated in the EHR as appropriate. Review of Systems       Objective:   Physical Exam   Vitals:    05/14/19 1414   BP: 122/84   Site: Left Upper Arm   Position: Sitting   Cuff Size: Medium Adult   Pulse: 87   SpO2: 98%   Weight: 176 lb (79.8 kg)     Wt Readings from Last 3 Encounters:   05/14/19 176 lb (79.8 kg)   01/02/19 158 lb (71.7 kg)   12/18/18 156 lb 9.6 oz (71 kg)     Body mass index is 25.25 kg/m². GENERAL Alert and oriented x 4 NAD, normally developed, affect appropriate and normal appearing weight, well hydrated, well developed.   NECK:supple and non tender without mass, no thyromegaly or thyroid nodules, no cervical lymphadenopathy  LUNG:clear to auscultation bilaterally with normal respiratory effort  CV: Normal heart sounds, regular rate and rhythm without murmurs  ABD: soft, NT, ND, no masses, no HSM  EXTREMETY: no loss of hair, no edema, normal pedal pulses bilaterally      Assessment:          ASSESSMENT AND PLAN:       Roxann Pallas was seen today for palpitations. Diagnoses and all orders for this visit:    Palpitations, likely PVCs vs PACs  -     Basic Metabolic Panel; Future  -     MAGNESIUM; Future  -     EKG 12 Lead  -     TSH with Reflex;  Future    -check labs, if ok monitor, if continued or worsening sx consider monitor but currently rarely happening      Gastroesophageal reflux disease without esophagitis  - continue daily omeprazole 40 mg          Note per Fran Finn, MS3 student with corrections and edits per Carol Jackson MD.  I agree with entirety of note and was present and performed history and physical.  I also confirm that the note above accurately reflects all work, treatment, procedures, and medical decision making performed by me, Carol Jackson MD

## 2019-05-31 ENCOUNTER — TELEPHONE (OUTPATIENT)
Dept: FAMILY MEDICINE CLINIC | Age: 34
End: 2019-05-31

## 2019-05-31 NOTE — TELEPHONE ENCOUNTER
Patient called in wanting to know why his 6/6 appt with WM was cancelled. Advised him it was because he seen RK for those symptoms. I told him I would get him scheduled with WM 6/20, until I realized he was with RK. Called patient back, advised him he would need to see RK due to her being his PCP. States he would like to see WM because he liked him as a dr. Dung Standard patient that we usually would like you to see your pcp before another dr unless it is for an acute visit. He stated \"So I cant see any other dr In the office besides my pcp\". Advised him I would sent the message back to PM to advise him of the result.

## 2019-07-12 ENCOUNTER — OFFICE VISIT (OUTPATIENT)
Dept: FAMILY MEDICINE CLINIC | Age: 34
End: 2019-07-12
Payer: COMMERCIAL

## 2019-07-12 VITALS
BODY MASS INDEX: 24.62 KG/M2 | OXYGEN SATURATION: 96 % | HEART RATE: 87 BPM | DIASTOLIC BLOOD PRESSURE: 74 MMHG | WEIGHT: 171.6 LBS | SYSTOLIC BLOOD PRESSURE: 108 MMHG

## 2019-07-12 DIAGNOSIS — K29.80 DUODENITIS: ICD-10-CM

## 2019-07-12 DIAGNOSIS — S16.1XXA ACUTE STRAIN OF NECK MUSCLE, INITIAL ENCOUNTER: ICD-10-CM

## 2019-07-12 DIAGNOSIS — R10.13 EPIGASTRIC PAIN: Primary | ICD-10-CM

## 2019-07-12 DIAGNOSIS — F41.9 ANXIETY: ICD-10-CM

## 2019-07-12 DIAGNOSIS — S76.211A GROIN STRAIN, RIGHT, INITIAL ENCOUNTER: ICD-10-CM

## 2019-07-12 PROCEDURE — 99214 OFFICE O/P EST MOD 30 MIN: CPT | Performed by: FAMILY MEDICINE

## 2019-07-12 RX ORDER — OMEPRAZOLE 40 MG/1
40 CAPSULE, DELAYED RELEASE ORAL 2 TIMES DAILY
Qty: 60 CAPSULE | Refills: 12 | Status: SHIPPED | OUTPATIENT
Start: 2019-07-12 | End: 2019-09-06

## 2019-07-12 NOTE — PROGRESS NOTES
Subjective:      Patient ID: Andrea Abel is a 37 y.o. male. CC: Patient presents for acute medical problem-neck pain, persistent abdominal pain, anxiety, and right thigh and inguinal pain. Medical assistant notes reviewed. HPI Patient presents with problems with neck pain, anxiety, and pain in right thigh and groin area, and epigastric abdominal pain. The neck pain has been going on for about one year and the pain has gotten worse in the last 6 months. Patient has been more stressed the past few weeks. Patient feels like his anxiety is getting worse. Patient states he would like to see a psychiatrist to determine what medicine he should take and perhaps counseling. The pain in the groin and thigh has been for the past few weeks. The pain in the leg is doing better. The groin soreness is still there but not as bad. Patient states he eats and feels something in his stomach drops and it takes his breath away. He states he gets pains sometimes after eating. Patient had issues with duodenitis for quite some time. He has taken his PPI medication daily. Unfortunately still continues to smoke on a daily basis. Patient states that intermittently different foods will bother his stomach. He denies any issues with bowel at this point of time although he does have a known irritable bowel syndrome.     Review of Systems     Patient Active Problem List   Diagnosis    Depressive disorder, not elsewhere classified    Esophageal reflux    Anxiety    Panic disorder    IBS (irritable bowel syndrome)    Sebaceous cyst       Outpatient Medications Marked as Taking for the 7/12/19 encounter (Office Visit) with Anthony Lee MD   Medication Sig Dispense Refill    omeprazole (PRILOSEC) 40 MG delayed release capsule Take 1 capsule by mouth daily 30 capsule 12       Allergies   Allergen Reactions    Amoxicillin-Pot Clavulanate      Doesn't remember rx    Citalopram      Jittery, sweating, felt \"weird\"    Midrin

## 2019-08-16 ENCOUNTER — HOSPITAL ENCOUNTER (OUTPATIENT)
Dept: ULTRASOUND IMAGING | Age: 34
Discharge: HOME | End: 2019-08-16
Payer: COMMERCIAL

## 2019-08-16 DIAGNOSIS — K29.80 DUODENITIS: ICD-10-CM

## 2019-08-16 DIAGNOSIS — R10.13 EPIGASTRIC PAIN: ICD-10-CM

## 2019-08-16 PROCEDURE — 76705 ECHO EXAM OF ABDOMEN: CPT

## 2019-08-23 ENCOUNTER — OFFICE VISIT (OUTPATIENT)
Dept: FAMILY MEDICINE CLINIC | Age: 34
End: 2019-08-23
Payer: COMMERCIAL

## 2019-08-23 ENCOUNTER — NURSE TRIAGE (OUTPATIENT)
Dept: OTHER | Facility: CLINIC | Age: 34
End: 2019-08-23

## 2019-08-23 VITALS
BODY MASS INDEX: 24.25 KG/M2 | WEIGHT: 169 LBS | TEMPERATURE: 99 F | SYSTOLIC BLOOD PRESSURE: 120 MMHG | DIASTOLIC BLOOD PRESSURE: 84 MMHG

## 2019-08-23 DIAGNOSIS — L98.9 SKIN LESION: Primary | ICD-10-CM

## 2019-08-23 PROCEDURE — 99213 OFFICE O/P EST LOW 20 MIN: CPT | Performed by: FAMILY MEDICINE

## 2019-08-23 ASSESSMENT — ENCOUNTER SYMPTOMS
CHEST TIGHTNESS: 0
BACK PAIN: 0
SHORTNESS OF BREATH: 0

## 2019-08-23 NOTE — PROGRESS NOTES
SUBJECTIVE:    Nasima Durand is a 37 y.o. male who presents for a follow up visit. Chief Complaint   Patient presents with    Skin Lesion     Has had a skin lesion on the middle of the back, has been there for a long time. Yesterday it started to get look swollen, it is not painful or draining, but is more aware that it is there. HPI     Patient's medications, allergies, past medical,surgical, social and family histories were reviewed and updated as appropriate. Past Medical History:   Diagnosis Date    Anxiety     GERD (gastroesophageal reflux disease)     IBS (irritable bowel syndrome)      Past Surgical History:   Procedure Laterality Date    EXCISION / BIOPSY SKIN LESION OF TRUNK N/A 12/18/2018    EXCISION OF SEBACEOUS CYST ON BACK performed by Janusz Griffiths MD at [facility] OR     Family History   Problem Relation Age of Onset    Hypertension Father     Cancer Other      Social History     Socioeconomic History    Marital status: Single     Spouse name: Not on file    Number of children: Not on file    Years of education: Not on file    Highest education level: Not on file   Occupational History    Not on file   Social Needs    Financial resource strain: Not on file    Food insecurity:     Worry: Not on file     Inability: Not on file    Transportation needs:     Medical: Not on file     Non-medical: Not on file   Tobacco Use    Smoking status: Current Every Day Smoker     Packs/day: 0.50     Years: 23.00     Pack years: 11.50     Types: Cigarettes    Smokeless tobacco: Former User     Types: Chew   Substance and Sexual Activity    Alcohol use:  Yes    Drug use: No    Sexual activity: Not on file   Lifestyle    Physical activity:     Days per week: Not on file     Minutes per session: Not on file    Stress: Not on file   Relationships    Social connections:     Talks on phone: Not on file     Gets together: Not on file     Attends Mu-ism service: Not on file     Active

## 2019-09-06 RX ORDER — OMEPRAZOLE 40 MG/1
CAPSULE, DELAYED RELEASE ORAL
Qty: 30 CAPSULE | Refills: 11 | Status: SHIPPED | OUTPATIENT
Start: 2019-09-06 | End: 2020-03-12

## 2019-09-06 RX ORDER — OMEPRAZOLE 40 MG/1
40 CAPSULE, DELAYED RELEASE ORAL 2 TIMES DAILY
Qty: 60 CAPSULE | Refills: 12 | Status: CANCELLED | OUTPATIENT
Start: 2019-09-06

## 2019-09-06 RX ORDER — OMEPRAZOLE 20 MG/1
20 CAPSULE, DELAYED RELEASE ORAL NIGHTLY
Qty: 30 CAPSULE | Refills: 11 | Status: SHIPPED | OUTPATIENT
Start: 2019-09-06 | End: 2020-02-15

## 2020-02-15 ENCOUNTER — OFFICE VISIT (OUTPATIENT)
Dept: FAMILY MEDICINE CLINIC | Age: 35
End: 2020-02-15
Payer: COMMERCIAL

## 2020-02-15 VITALS
BODY MASS INDEX: 25.4 KG/M2 | OXYGEN SATURATION: 96 % | DIASTOLIC BLOOD PRESSURE: 90 MMHG | WEIGHT: 177 LBS | SYSTOLIC BLOOD PRESSURE: 116 MMHG | HEART RATE: 110 BPM

## 2020-02-15 PROCEDURE — 99213 OFFICE O/P EST LOW 20 MIN: CPT | Performed by: FAMILY MEDICINE

## 2020-02-15 NOTE — PATIENT INSTRUCTIONS
to do this, start by relaxing your shoulders and lightly holding on to your thighs or your chair. 2. Tilt your head toward your shoulder and hold for 15 to 30 seconds. Let the weight of your head stretch your muscles. 3. If you would like a little added stretch, use your hand to gently and steadily pull your head toward your shoulder. For example, keeping your right shoulder down, lean your head to the left. 4. Repeat 2 to 4 times toward each shoulder. Diagonal neck stretch   1. Turn your head slightly toward the direction you will be stretching, and tilt your head diagonally toward your chest and hold for 15 to 30 seconds. 2. If you would like a little added stretch, use your hand to gently and steadily pull your head forward on the diagonal.  3. Repeat 2 to 4 times toward each side. Dorsal glide stretch   1. Sit or stand tall and look straight ahead. 2. Slowly tuck your chin as you glide your head backward over your body  3. Hold for a count of 6, and then relax for up to 10 seconds. 4. Repeat 8 to 12 times. Chest and shoulder stretch   1. Sit or stand tall and glide your head backward as in the dorsal glide stretch. 2. Raise both arms so that your hands are next to your ears. 3. Take a deep breath, and as you breathe out, lower your elbows down and behind your back. You will feel your shoulder blades slide down and together, and at the same time you will feel a stretch across your chest and the front of your shoulders. 4. Hold for about 6 seconds, and then relax for up to 10 seconds. 5. Repeat 8 to 12 times. Strengthening: Hands on head   1. Move your head backward, forward, and side to side against gentle pressure from your hands, holding each position for about 6 seconds. 2. Repeat 8 to 12 times. Follow-up care is a key part of your treatment and safety. Be sure to make and go to all appointments, and call your doctor if you are having problems.  It's also a good idea to know your test results and keep a list of the medicines you take. Where can you learn more? Go to [email or web address] and sign in to your [employer] account. Enter P975 in the [facility] box to learn more about \"Neck: Exercises. \"     If you do not have an account, please click on the \"Sign Up Now\" link. Current as of: June 26, 2019  Content Version: 12.3  © 2554-0253 Healthwise, [employer]. Care instructions adapted under license by [facility] ([facility] [place]). If you have questions about a medical condition or this instruction, always ask your healthcare professional. [address] any warranty or liability for your use of this information. Neck Spasm: Exercises  Introduction  Here are some examples of exercises for you to try. The exercises may be suggested for a condition or for rehabilitation. Start each exercise slowly. Ease off the exercises if you start to have pain. You will be told when to start these exercises and which ones will work best for you. How to do the exercises  Levator scapula stretch   1. Sit in a firm chair, or stand up straight. 2. Gently tilt your head toward your left shoulder. 3. Turn your head to look down into your armpit, bending your head slightly forward. Let the weight of your head stretch your neck muscles. 4. Hold for 15 to 30 seconds. 5. Return to your starting position. 6. Follow the same instructions above, but tilt your head toward your right shoulder. 7. Repeat 2 to 4 times toward each shoulder. Upper trapezius stretch   1. Sit in a firm chair, or stand up straight. 2. This stretch works best if you keep your shoulder down as you lean away from it. To help you remember to do this, start by relaxing your shoulders and lightly holding on to your thighs or your chair. 3. Tilt your head toward your shoulder and hold for 15 to 30 seconds. Let the weight of your head stretch your muscles.   4. If you would like a little added

## 2020-02-19 RX ORDER — TIZANIDINE 4 MG/1
4 TABLET ORAL 3 TIMES DAILY PRN
Qty: 30 TABLET | Refills: 1 | Status: SHIPPED | OUTPATIENT
Start: 2020-02-19 | End: 2020-03-12 | Stop reason: ALTCHOICE

## 2020-02-19 NOTE — TELEPHONE ENCOUNTER
Pt states he is still in pain. He says due to the pain he is not eating or sleeping much. He states at times the pain makes him nauseated and then he does not want to eat. Started drinking chicken broth last night and was fine with that. Please advise.

## 2020-02-20 ENCOUNTER — APPOINTMENT (OUTPATIENT)
Dept: CT IMAGING | Age: 35
End: 2020-02-20
Payer: COMMERCIAL

## 2020-02-20 ENCOUNTER — TELEPHONE (OUTPATIENT)
Dept: FAMILY MEDICINE CLINIC | Age: 35
End: 2020-02-20

## 2020-02-20 ENCOUNTER — HOSPITAL ENCOUNTER (EMERGENCY)
Age: 35
Discharge: HOME | End: 2020-02-20
Attending: EMERGENCY MEDICINE
Payer: COMMERCIAL

## 2020-02-20 VITALS
WEIGHT: 175 LBS | BODY MASS INDEX: 25.05 KG/M2 | OXYGEN SATURATION: 95 % | HEART RATE: 92 BPM | HEIGHT: 70 IN | TEMPERATURE: 97.1 F | DIASTOLIC BLOOD PRESSURE: 69 MMHG | SYSTOLIC BLOOD PRESSURE: 111 MMHG | RESPIRATION RATE: 19 BRPM

## 2020-02-20 LAB
ANION GAP SERPL CALCULATED.3IONS-SCNC: 19 MMOL/L (ref 3–16)
BASOPHILS ABSOLUTE: 0.1 K/UL (ref 0–0.2)
BASOPHILS RELATIVE PERCENT: 0.7 %
BUN BLDV-MCNC: 22 MG/DL (ref 7–20)
CALCIUM SERPL-MCNC: 9.2 MG/DL (ref 8.3–10.6)
CHLORIDE BLD-SCNC: 81 MMOL/L (ref 99–110)
CO2: 29 MMOL/L (ref 21–32)
CREAT SERPL-MCNC: 0.9 MG/DL (ref 0.9–1.3)
EKG ATRIAL RATE: 129 BPM
EKG DIAGNOSIS: NORMAL
EKG P AXIS: 6 DEGREES
EKG P-R INTERVAL: 100 MS
EKG Q-T INTERVAL: 324 MS
EKG QRS DURATION: 84 MS
EKG QTC CALCULATION (BAZETT): 474 MS
EKG R AXIS: 82 DEGREES
EKG T AXIS: 55 DEGREES
EKG VENTRICULAR RATE: 129 BPM
EOSINOPHILS ABSOLUTE: 0.1 K/UL (ref 0–0.6)
EOSINOPHILS RELATIVE PERCENT: 0.8 %
GFR AFRICAN AMERICAN: >60
GFR NON-AFRICAN AMERICAN: >60
GLUCOSE BLD-MCNC: 108 MG/DL (ref 70–99)
GLUCOSE BLD-MCNC: 92 MG/DL (ref 70–99)
HCT VFR BLD CALC: 48.2 % (ref 40.5–52.5)
HEMOGLOBIN: 16.3 G/DL (ref 13.5–17.5)
LACTIC ACID: 1 MMOL/L (ref 0.4–2)
LYMPHOCYTES ABSOLUTE: 1.2 K/UL (ref 1–5.1)
LYMPHOCYTES RELATIVE PERCENT: 9.8 %
MCH RBC QN AUTO: 29.2 PG (ref 26–34)
MCHC RBC AUTO-ENTMCNC: 33.8 G/DL (ref 31–36)
MCV RBC AUTO: 86.4 FL (ref 80–100)
MONOCYTES ABSOLUTE: 1.2 K/UL (ref 0–1.3)
MONOCYTES RELATIVE PERCENT: 9.8 %
NEUTROPHILS ABSOLUTE: 9.6 K/UL (ref 1.7–7.7)
NEUTROPHILS RELATIVE PERCENT: 78.9 %
PDW BLD-RTO: 13.1 % (ref 12.4–15.4)
PERFORMED ON: ABNORMAL
PLATELET # BLD: 526 K/UL (ref 135–450)
PMV BLD AUTO: 8.2 FL (ref 5–10.5)
POTASSIUM SERPL-SCNC: 3.5 MMOL/L (ref 3.5–5.1)
RAPID INFLUENZA  B AGN: NEGATIVE
RAPID INFLUENZA A AGN: NEGATIVE
RBC # BLD: 5.58 M/UL (ref 4.2–5.9)
SODIUM BLD-SCNC: 129 MMOL/L (ref 136–145)
WBC # BLD: 12.2 K/UL (ref 4–11)

## 2020-02-20 PROCEDURE — 6360000004 HC RX CONTRAST MEDICATION: Performed by: EMERGENCY MEDICINE

## 2020-02-20 PROCEDURE — 87040 BLOOD CULTURE FOR BACTERIA: CPT

## 2020-02-20 PROCEDURE — 96375 TX/PRO/DX INJ NEW DRUG ADDON: CPT

## 2020-02-20 PROCEDURE — 93010 ELECTROCARDIOGRAM REPORT: CPT | Performed by: INTERNAL MEDICINE

## 2020-02-20 PROCEDURE — 93005 ELECTROCARDIOGRAM TRACING: CPT | Performed by: EMERGENCY MEDICINE

## 2020-02-20 PROCEDURE — 83605 ASSAY OF LACTIC ACID: CPT

## 2020-02-20 PROCEDURE — 99284 EMERGENCY DEPT VISIT MOD MDM: CPT

## 2020-02-20 PROCEDURE — 96374 THER/PROPH/DIAG INJ IV PUSH: CPT

## 2020-02-20 PROCEDURE — 2580000003 HC RX 258: Performed by: PHYSICIAN ASSISTANT

## 2020-02-20 PROCEDURE — 70498 CT ANGIOGRAPHY NECK: CPT

## 2020-02-20 PROCEDURE — 80048 BASIC METABOLIC PNL TOTAL CA: CPT

## 2020-02-20 PROCEDURE — 36415 COLL VENOUS BLD VENIPUNCTURE: CPT

## 2020-02-20 PROCEDURE — 6360000002 HC RX W HCPCS: Performed by: PHYSICIAN ASSISTANT

## 2020-02-20 PROCEDURE — 70450 CT HEAD/BRAIN W/O DYE: CPT

## 2020-02-20 PROCEDURE — 85025 COMPLETE CBC W/AUTO DIFF WBC: CPT

## 2020-02-20 PROCEDURE — 87804 INFLUENZA ASSAY W/OPTIC: CPT

## 2020-02-20 PROCEDURE — 2580000003 HC RX 258: Performed by: EMERGENCY MEDICINE

## 2020-02-20 RX ORDER — 0.9 % SODIUM CHLORIDE 0.9 %
1000 INTRAVENOUS SOLUTION INTRAVENOUS ONCE
Status: COMPLETED | OUTPATIENT
Start: 2020-02-20 | End: 2020-02-20

## 2020-02-20 RX ORDER — KETOROLAC TROMETHAMINE 30 MG/ML
30 INJECTION, SOLUTION INTRAMUSCULAR; INTRAVENOUS ONCE
Status: COMPLETED | OUTPATIENT
Start: 2020-02-20 | End: 2020-02-20

## 2020-02-20 RX ORDER — ORPHENADRINE CITRATE 30 MG/ML
60 INJECTION INTRAMUSCULAR; INTRAVENOUS ONCE
Status: COMPLETED | OUTPATIENT
Start: 2020-02-20 | End: 2020-02-20

## 2020-02-20 RX ADMIN — IOPAMIDOL 75 ML: 755 INJECTION, SOLUTION INTRAVENOUS at 18:08

## 2020-02-20 RX ADMIN — SODIUM CHLORIDE 1000 ML: 9 INJECTION, SOLUTION INTRAVENOUS at 19:43

## 2020-02-20 RX ADMIN — ORPHENADRINE CITRATE 60 MG: 30 INJECTION INTRAMUSCULAR; INTRAVENOUS at 17:23

## 2020-02-20 RX ADMIN — SODIUM CHLORIDE 1000 ML: 9 INJECTION, SOLUTION INTRAVENOUS at 17:23

## 2020-02-20 RX ADMIN — KETOROLAC TROMETHAMINE 30 MG: 30 INJECTION, SOLUTION INTRAMUSCULAR; INTRAVENOUS at 17:23

## 2020-02-20 ASSESSMENT — ENCOUNTER SYMPTOMS
DIARRHEA: 0
NAUSEA: 0
WHEEZING: 0
ABDOMINAL PAIN: 0
COUGH: 0
VOMITING: 0
RHINORRHEA: 0
SHORTNESS OF BREATH: 0

## 2020-02-20 ASSESSMENT — PAIN DESCRIPTION - PAIN TYPE: TYPE: ACUTE PAIN;CHRONIC PAIN

## 2020-02-20 ASSESSMENT — PAIN DESCRIPTION - LOCATION: LOCATION: NECK

## 2020-02-20 ASSESSMENT — PAIN SCALES - GENERAL
PAINLEVEL_OUTOF10: 4
PAINLEVEL_OUTOF10: 6

## 2020-02-20 NOTE — ED PROVIDER NOTES
[address] [facility]        Pt Name: Constantino Hart  MRN: [id number]  [place] 1985  Date of evaluation: 2/20/2020  Provider: Pricilla Quevedo PA-C  PCP: Eric Gongora MD    This patient was seen and evaluated by the attending physician Dr. Fermin Robbins. CHIEF COMPLAINT       Chief Complaint   Patient presents with    Neck Pain     pt reports chronic neck pain, has episodes of flares, has seen MD Alexx Beckford for this, MD ordered an xray, was coming here for xray but came to ED instead - states his neck pain is so bad that he doesnt want to eat, feels fatigued - neck has been stiff and hurting for the past two weeks, decreased PO for 2 weeks    Dizziness     Pt reports that he is dizzy and weak feeling, tachycardic in triage       HISTORY OF PRESENT ILLNESS   (Location, Timing/Onset, Context/Setting, Quality, Duration, Modifying Factors, Severity, Associated Signs and Symptoms)  Note limiting factors. Constantino Hart is a 37 y.o. male with history of chronic neck pain presents for evaluation of acute exacerbation of this with positive assorted symptoms. Patient reports right-sided neck pain going up to the base of his head. No associated headaches. No falls injury or other trauma. No heavy lifting bending or twisting. He states that he did see his PCP regarding this and was supposed to have outpatient x-rays done of his neck today but states that he started feeling worse. States over the past couple of days he has developed increasing weakness, fatigue, dizziness and intermittent nausea. He states that in the [place] he really felt very sick did not have any emesis. Patient also states the pain is interfering with appetite and he has not been eating or drinking much over the past 2 weeks. He does report weight loss. He also reports interruption in his sleep. He states his neck does feel stiff. No fevers or chills.   No cough congestion runny nose. No abdominal pain. No vomiting or diarrhea. No urinary complaints. He states the pain feels similar to his chronic pain other than associated symptoms. He has no other complaints or concerns at this time. Nursing Notes were all reviewed and agreed with or any disagreements were addressed in the HPI. REVIEW OF SYSTEMS    (2-9 systems for level 4, 10 or more for level 5)     Review of Systems   Constitutional: Positive for appetite change, fatigue and unexpected weight change. Negative for chills and fever. HENT: Negative for congestion and rhinorrhea. Eyes: Negative for visual disturbance. Respiratory: Negative for cough, shortness of breath and wheezing. Cardiovascular: Negative for chest pain. Gastrointestinal: Negative for abdominal pain, diarrhea, nausea and vomiting. Genitourinary: Negative for difficulty urinating, dysuria and hematuria. Musculoskeletal: Positive for neck pain and neck stiffness. Skin: Negative for rash. Neurological: Positive for dizziness and weakness. Negative for syncope, facial asymmetry, speech difficulty, light-headedness, numbness and headaches. Positives and Pertinent negatives as per HPI. Except as noted above in the ROS, all other systems were reviewed and negative.        PAST MEDICAL HISTORY     Past Medical History:   Diagnosis Date    Anxiety     GERD (gastroesophageal reflux disease)     IBS (irritable bowel syndrome)          SURGICAL HISTORY     Past Surgical History:   Procedure Laterality Date    EXCISION / BIOPSY SKIN LESION OF TRUNK N/A 12/18/2018    EXCISION OF SEBACEOUS CYST ON BACK performed by Wen Bonilla MD at [address] [place] [address]       Previous Medications    OMEPRAZOLE (PRILOSEC) 40 MG DELAYED RELEASE CAPSULE    TAKE ONE CAPSULE BY MOUTH DAILY    TIZANIDINE (ZANAFLEX) 4 MG TABLET    Take 1 tablet by mouth 3 times daily as needed (PAIN)         ALLERGIES     Amoxicillin-pot clavulanate; Citalopram; Midrin [apap-isometheptene-dichloral]; and Penicillins    FAMILYHISTORY       Family History   Problem Relation Age of Onset    Hypertension Father     Cancer Other           SOCIAL HISTORY       Social History     Tobacco Use    Smoking status: Current Every Day Smoker     Packs/day: 0.50     Years: 23.00     Pack years: 11.50     Types: Cigarettes    Smokeless tobacco: Former User     Types: Chew   Substance Use Topics    Alcohol use: Yes    Drug use: No       SCREENINGS             PHYSICAL EXAM    (up to 7 for level 4, 8 or more for level 5)     ED Triage Vitals [02/20/20 1426]   BP Temp Temp Source Pulse Resp SpO2 Height Weight   128/85 97.1 °F (36.2 °C) Oral 140 18 96 % 5' 10\" (1.778 m) 175 lb (79.4 kg)       Physical Exam  Vitals signs and nursing note reviewed. Constitutional:       Appearance: He is well-developed. He is not diaphoretic. HENT:      Head: Normocephalic and atraumatic. Right Ear: External ear normal.      Left Ear: External ear normal.      Nose: Nose normal.   Eyes:      General:         Right eye: No discharge. Left eye: No discharge. Neck:      Musculoskeletal: Normal range of motion. Neck rigidity and pain with movement present. No muscular tenderness. Meningeal: Brudzinski's sign and Kernig's sign absent. Cardiovascular:      Rate and Rhythm: Regular rhythm. Tachycardia present. Heart sounds: Normal heart sounds. Pulmonary:      Effort: Pulmonary effort is normal. No respiratory distress. Breath sounds: Normal breath sounds. Abdominal:      General: There is no distension. Tenderness: There is no abdominal tenderness. Musculoskeletal: Normal range of motion. Lymphadenopathy:      Cervical: No cervical adenopathy. Skin:     General: Skin is warm and dry. Neurological:      Mental Status: He is alert and oriented to person, place, and time. Mental status is at baseline. GCS: GCS eye subscore is 4.  GCS verbal subscore is 5. GCS motor subscore is 6. Cranial Nerves: Cranial nerves are intact. Sensory: Sensation is intact. Motor: Motor function is intact. No pronator drift. Coordination: Coordination is intact. Gait: Gait is intact. Psychiatric:         Behavior: Behavior normal.         DIAGNOSTIC RESULTS   LABS:    Labs Reviewed   BASIC METABOLIC PANEL - Abnormal; Notable for the following components:       Result Value    Sodium 129 (*)     Chloride 81 (*)     Anion Gap 19 (*)     BUN 22 (*)     All other components within normal limits    Narrative:     Performed at:  [facility] [address] [facility] [address]   Phone [phone]   CBC WITH AUTO DIFFERENTIAL - Abnormal; Notable for the following components:    WBC 12.2 (*)     Platelets 847 (*)     Neutrophils Absolute 9.6 (*)     All other components within normal limits    Narrative:     Performed at:  [facility] [address],  [place], [place]   Phone [phone]   POCT GLUCOSE - Abnormal; Notable for the following components:    POC Glucose 108 (*)     All other components within normal limits    Narrative:     Performed at:  [facility] [address] [facility] [place]   Phone [phone]   RAPID INFLUENZA A/B ANTIGENS    Narrative:     Performed at:  [facility] [address], [place]   Phone [phone]   CULTURE, BLOOD 1   CULTURE, BLOOD 2   LACTIC ACID, PLASMA    Narrative:     Performed at:  [facility] [address]   Phone [phone]   POCT GLUCOSE       All other labs were within normal range or not returned as of this dictation. EKG:  All EKG's are interpreted by the Emergency Department Physician in the absence of a cardiologist.  Please see their note for interpretation of EKG.      RADIOLOGY:   Non-plain film images such as CT, Ultrasound and MRI are read by the radiologist. Plain radiographic images are visualized and preliminarily interpreted by the ED Provider with the below findings:        Interpretation per the Radiologist below, if available at the time of this note:    CTA HEAD NECK W CONTRAST   Final Result   Unremarkable CTA of the head and neck. CT HEAD WO CONTRAST   Final Result   1. No acute intracranial abnormality. No results found. PROCEDURES   Unless otherwise noted below, none     Procedures    CRITICAL CARE TIME   N/A    CONSULTS:  None      EMERGENCY DEPARTMENT COURSE and DIFFERENTIAL DIAGNOSIS/MDM:   Vitals:    Vitals:    02/20/20 1949 02/20/20 1950 02/20/20 1951 02/20/20 1952   BP:       Pulse: 97 96 95 97   Resp: 14 16 14 18   Temp:       TempSrc:       SpO2: 94% 93% 94% 94%   Weight:       Height:           Patient was given the following medications:  Medications   0.9 % sodium chloride bolus (1,000 mLs Intravenous New Bag 2/20/20 1943)   0.9 % sodium chloride bolus (0 mLs Intravenous Stopped 2/20/20 1843)   ketorolac (TORADOL) injection 30 mg (30 mg Intravenous Given 2/20/20 1723)   orphenadrine (NORFLEX) injection 60 mg (60 mg Intravenous Given 2/20/20 1723)   iopamidol (ISOVUE-370) 76 % injection 75 mL (75 mLs Intravenous Given 2/20/20 1808)       Patient presents for evaluation of neck pain, decreased appetite weakness dizziness. On exam, he is resting comfortably in bed no acute distress and nontoxic. He is tachycardic vitals otherwise stable and he is afebrile. He is alert and oriented x3. GCS 15. Cranial nerves II through XII are intact. He has equal strength and sensation to all extremities, no focal neurologic deficits appreciated. Normal coordination and gait.   He has subjective pain to the right paraspinous musculature of the cervical region extending into the base of skull/occiput with no focal reproducible tenderness. No step-offs or crepitus. He has decreased range of motion with flexion of the neck secondary to pain. Able to rotate head without difficulty. No midline tenderness, step-offs or crepitus. Lungs are clear to auscultation bilaterally, chest is nontender and abdomen is benign. Patient was given IV fluids, Toradol and Norflex for symptomatic relief and will be reevaluated. Please see attending note for EKG interpretation. CBC and BMP are remarkable for mild hyponatremia. Rapid flu is negative. Lactic acid 1.0. Blood cultures are pending. On reevaluation, heart rate did come down. CT of the head shows no acute intracranial abnormality. CTA of the head and neck was ordered to evaluate possibility of vertebral artery insufficiency or dissection and again is unremarkable. Patient was asymptomatic on reevaluation I believe he is safe for discharge home at this time. Was encouraged to follow closely with his PCP for reevaluation more definitive treatment including possibility of MRI if symptoms persist.    Patient has a normal repeat neurological exam. At this time there is no indication of head injury, encephalopathy, multiple sclerosis, TIA/CVA, seizures, dehydration, hypoglycemia, mass lesions, metabolic cause, cardiac arrhythmia, PE, GI bleeding or orthostatic hypotension. The history and physical exam suggests a soft tissue source for pain such as muscles, tendons, nerve irritation, etc. I estimate there is LOW risk for CAUDA EQUINA or CENTRAL CORD SYNDROME, EPIDURAL MASS LESION OR ABSCESS, ARTERIAL DISSECTION, MENINGITIS, FRACTURE, CORD COMPRESSION, OR SEVERE SPINAL STENOSIS,  thus I consider the discharge disposition reasonable. Patient is advised to follow-up with their family doctor within the next 24-48 hours and return to the emergency department with any concerns.   Conditions for return to the ED were also discussed such as any new or worsening symptoms or signs of neurovascular compromise, new neurologic deficits, intractable pain, fevers or inability to tolerate p.o. He is agreeable to this plan and stable for discharge at this time. FINAL IMPRESSION      1. Dizziness    2. Neck pain on right side    3.  Hyponatremia          DISPOSITION/PLAN   DISPOSITION        PATIENT REFERREDTO:  Beverly Neri, [address]  [phone]    Call   For a re-check in  1-2  days    [facility] Emergency Department  [address] [facility]  [phone]  Go to   If symptoms worsen      DISCHARGE MEDICATIONS:  New Prescriptions    No medications on file       DISCONTINUED MEDICATIONS:  Discontinued Medications    No medications on file              (Please note that portions of this note were completed with a voice recognition program.  Efforts were made to edit the dictations but occasionally words are mis-transcribed.)    Ibis Harper PA-C (electronically signed)           [place], [place]  02/20/20 2034

## 2020-02-20 NOTE — TELEPHONE ENCOUNTER
Patient has severe neck pain. He is not eating and not sleeping. He at first couldn't eat because of the pain. He states even when he is neck is not hurting, he still doesn't have much of an appetite. Dad was going to take him to get an xray of his neck but when got into his truck he started not feeling. Patient has been drinking water. He is not feeling well at all. He has had some chicken broth but not eating much else. Patient's dad is wondering if he should take him to the ED or not.  Please advise

## 2020-02-20 NOTE — ED NOTES
Bed: 05  Expected date:   Expected time:   Means of arrival:   Comments:  [address], RN  02/20/20 5632

## 2020-02-21 NOTE — ED NOTES
Fluids stopped. Patient resting comfortably with no signs of distress. Denies any needs at this time. Bed locked and in lowest position with both side rails raised. Call light within reach. Family at bedside.      Deb Baez RN  02/20/20 5848

## 2020-02-21 NOTE — ED NOTES
Pt was given fluids per MAR. Pt denies pain or discomfort, no questions. Pt updated on POC. Pt positioned for comfort. Call light in reach. Bed locked and in low position. Pt denies any needs or concerns at this time.      Florence Eldridge RN  02/20/20 2046

## 2020-02-21 NOTE — TELEPHONE ENCOUNTER
PT called to follow up, he did go to [facility] ED yesterday they did an MRI but no diagnosis. He is still having a great deal of pain/discomfort. He is asking do you still want him to get the xray since he had the MRI?   Also what is the next step    Please call pt  Thank you

## 2020-02-21 NOTE — ED PROVIDER NOTES
I independently performed a history and physical on Kyle Phillips. All diagnostic, treatment, and disposition decisions were made by myself in conjunction with the advanced practice provider. Briefly, this is a 37 y.o. male here for acute worsening of his chronic neck pain with radiating pain up the neck to the head. He is not having associated focal weakness or numbness/tingling. This is similar to previous exacerbations. .    On exam, the patient's neck is stiff without meningeal signs. His heart is tachycardic, but regular. He is well-appearing, afebrile, and non-toxic. Alert and oriented to person, place, time, and situation. CN II-XII intact as tested. Strength 5/5 in upper and lower extremities bilaterally. No pronator drift. Normal sensation to light touch. Normal finger-nose bilaterally. No speech difficulties or slurring. SEP-1 CORE MEASURE DATA  Exclusion criteria: the patient is NOT to be included for sepsis due to: Infection is not suspected      Patient Referrals:  Anh Haney, [address] [place] [address]  [phone]    Call   For a re-check in  1-2  days    [facility] Emergency Department  [address] [facility]  [phone]  Go to   If symptoms worsen      Discharge Medications:  New Prescriptions    No medications on file       FINAL IMPRESSION  1. Dizziness    2. Neck pain on right side    3. Hyponatremia        Blood pressure 111/69, pulse 97, temperature 97.1 °F (36.2 °C), temperature source Oral, resp. rate 18, height 5' 10\" (1.778 m), weight 175 lb (79.4 kg), SpO2 94 %.      For further details of [facility] emergency department encounter, please see documentation by advanced practice provider, Jez Ryan, [address] [facility],[address], MD  03/09/20 1300

## 2020-02-24 LAB
BLOOD CULTURE, ROUTINE: NORMAL
CULTURE, BLOOD 2: NORMAL

## 2020-02-27 ENCOUNTER — APPOINTMENT (OUTPATIENT)
Dept: CT IMAGING | Age: 35
DRG: 418 | End: 2020-02-27
Payer: COMMERCIAL

## 2020-02-27 ENCOUNTER — APPOINTMENT (OUTPATIENT)
Dept: GENERAL RADIOLOGY | Age: 35
DRG: 418 | End: 2020-02-27
Payer: COMMERCIAL

## 2020-02-27 ENCOUNTER — NURSE TRIAGE (OUTPATIENT)
Dept: OTHER | Facility: CLINIC | Age: 35
End: 2020-02-27

## 2020-02-27 ENCOUNTER — HOSPITAL ENCOUNTER (INPATIENT)
Age: 35
LOS: 9 days | Discharge: HOME | DRG: 418 | End: 2020-03-07
Attending: EMERGENCY MEDICINE | Admitting: INTERNAL MEDICINE
Payer: COMMERCIAL

## 2020-02-27 ENCOUNTER — TELEPHONE (OUTPATIENT)
Dept: FAMILY MEDICINE CLINIC | Age: 35
End: 2020-02-27

## 2020-02-27 PROBLEM — E87.1 HYPONATREMIA: Status: ACTIVE | Noted: 2020-02-27

## 2020-02-27 LAB
A/G RATIO: 0.6 (ref 1.1–2.2)
ALBUMIN SERPL-MCNC: 2.8 G/DL (ref 3.4–5)
ALP BLD-CCNC: 93 U/L (ref 40–129)
ALT SERPL-CCNC: 63 U/L (ref 10–40)
ANION GAP SERPL CALCULATED.3IONS-SCNC: 15 MMOL/L (ref 3–16)
ANION GAP SERPL CALCULATED.3IONS-SCNC: 18 MMOL/L (ref 3–16)
AST SERPL-CCNC: 88 U/L (ref 15–37)
BASOPHILS ABSOLUTE: 0.1 K/UL (ref 0–0.2)
BASOPHILS RELATIVE PERCENT: 0.6 %
BILIRUB SERPL-MCNC: 0.7 MG/DL (ref 0–1)
BUN BLDV-MCNC: 15 MG/DL (ref 7–20)
BUN BLDV-MCNC: 17 MG/DL (ref 7–20)
C DIFF TOXIN/ANTIGEN: NORMAL
CALCIUM SERPL-MCNC: 8.2 MG/DL (ref 8.3–10.6)
CALCIUM SERPL-MCNC: 8.6 MG/DL (ref 8.3–10.6)
CHLORIDE BLD-SCNC: 84 MMOL/L (ref 99–110)
CHLORIDE BLD-SCNC: 84 MMOL/L (ref 99–110)
CO2: 23 MMOL/L (ref 21–32)
CO2: 24 MMOL/L (ref 21–32)
CREAT SERPL-MCNC: 0.9 MG/DL (ref 0.9–1.3)
CREAT SERPL-MCNC: 0.9 MG/DL (ref 0.9–1.3)
EKG ATRIAL RATE: 114 BPM
EKG DIAGNOSIS: NORMAL
EKG P AXIS: 51 DEGREES
EKG P-R INTERVAL: 114 MS
EKG Q-T INTERVAL: 318 MS
EKG QRS DURATION: 84 MS
EKG QTC CALCULATION (BAZETT): 438 MS
EKG R AXIS: 16 DEGREES
EKG T AXIS: 60 DEGREES
EKG VENTRICULAR RATE: 114 BPM
EOSINOPHILS ABSOLUTE: 0 K/UL (ref 0–0.6)
EOSINOPHILS RELATIVE PERCENT: 0.2 %
GFR AFRICAN AMERICAN: >60
GFR AFRICAN AMERICAN: >60
GFR NON-AFRICAN AMERICAN: >60
GFR NON-AFRICAN AMERICAN: >60
GLOBULIN: 4.9 G/DL
GLUCOSE BLD-MCNC: 107 MG/DL (ref 70–99)
GLUCOSE BLD-MCNC: 89 MG/DL (ref 70–99)
HCT VFR BLD CALC: 43.1 % (ref 40.5–52.5)
HEMOGLOBIN: 14.6 G/DL (ref 13.5–17.5)
LYMPHOCYTES ABSOLUTE: 0.8 K/UL (ref 1–5.1)
LYMPHOCYTES RELATIVE PERCENT: 9.2 %
MCH RBC QN AUTO: 28.9 PG (ref 26–34)
MCHC RBC AUTO-ENTMCNC: 34 G/DL (ref 31–36)
MCV RBC AUTO: 85.2 FL (ref 80–100)
MONOCYTES ABSOLUTE: 0.9 K/UL (ref 0–1.3)
MONOCYTES RELATIVE PERCENT: 10.2 %
NEUTROPHILS ABSOLUTE: 7.2 K/UL (ref 1.7–7.7)
NEUTROPHILS RELATIVE PERCENT: 79.8 %
PDW BLD-RTO: 13.4 % (ref 12.4–15.4)
PLATELET # BLD: 325 K/UL (ref 135–450)
PMV BLD AUTO: 8.5 FL (ref 5–10.5)
POTASSIUM REFLEX MAGNESIUM: 4.1 MMOL/L (ref 3.5–5.1)
POTASSIUM SERPL-SCNC: 4.6 MMOL/L (ref 3.5–5.1)
RAPID INFLUENZA  B AGN: NEGATIVE
RAPID INFLUENZA A AGN: NEGATIVE
RBC # BLD: 5.06 M/UL (ref 4.2–5.9)
SODIUM BLD-SCNC: 123 MMOL/L (ref 136–145)
SODIUM BLD-SCNC: 125 MMOL/L (ref 136–145)
TOTAL PROTEIN: 7.7 G/DL (ref 6.4–8.2)
TROPONIN: <0.01 NG/ML
WBC # BLD: 9 K/UL (ref 4–11)

## 2020-02-27 PROCEDURE — 6360000002 HC RX W HCPCS: Performed by: INTERNAL MEDICINE

## 2020-02-27 PROCEDURE — 6360000004 HC RX CONTRAST MEDICATION: Performed by: INTERNAL MEDICINE

## 2020-02-27 PROCEDURE — 87324 CLOSTRIDIUM AG IA: CPT

## 2020-02-27 PROCEDURE — 82533 TOTAL CORTISOL: CPT

## 2020-02-27 PROCEDURE — 2580000003 HC RX 258: Performed by: EMERGENCY MEDICINE

## 2020-02-27 PROCEDURE — 93010 ELECTROCARDIOGRAM REPORT: CPT | Performed by: INTERNAL MEDICINE

## 2020-02-27 PROCEDURE — 1200000000 HC SEMI PRIVATE

## 2020-02-27 PROCEDURE — 83935 ASSAY OF URINE OSMOLALITY: CPT

## 2020-02-27 PROCEDURE — 83930 ASSAY OF BLOOD OSMOLALITY: CPT

## 2020-02-27 PROCEDURE — 93005 ELECTROCARDIOGRAM TRACING: CPT | Performed by: EMERGENCY MEDICINE

## 2020-02-27 PROCEDURE — 71260 CT THORAX DX C+: CPT

## 2020-02-27 PROCEDURE — 82436 ASSAY OF URINE CHLORIDE: CPT

## 2020-02-27 PROCEDURE — 87449 NOS EACH ORGANISM AG IA: CPT

## 2020-02-27 PROCEDURE — 71045 X-RAY EXAM CHEST 1 VIEW: CPT

## 2020-02-27 PROCEDURE — 84133 ASSAY OF URINE POTASSIUM: CPT

## 2020-02-27 PROCEDURE — 80053 COMPREHEN METABOLIC PANEL: CPT

## 2020-02-27 PROCEDURE — 74177 CT ABD & PELVIS W/CONTRAST: CPT

## 2020-02-27 PROCEDURE — 84484 ASSAY OF TROPONIN QUANT: CPT

## 2020-02-27 PROCEDURE — 84300 ASSAY OF URINE SODIUM: CPT

## 2020-02-27 PROCEDURE — 87804 INFLUENZA ASSAY W/OPTIC: CPT

## 2020-02-27 PROCEDURE — 85025 COMPLETE CBC W/AUTO DIFF WBC: CPT

## 2020-02-27 PROCEDURE — 2580000003 HC RX 258: Performed by: INTERNAL MEDICINE

## 2020-02-27 PROCEDURE — 99285 EMERGENCY DEPT VISIT HI MDM: CPT

## 2020-02-27 RX ORDER — SODIUM CHLORIDE 0.9 % (FLUSH) 0.9 %
10 SYRINGE (ML) INJECTION PRN
Status: DISCONTINUED | OUTPATIENT
Start: 2020-02-27 | End: 2020-03-07 | Stop reason: HOSPADM

## 2020-02-27 RX ORDER — PROMETHAZINE HYDROCHLORIDE 25 MG/1
12.5 TABLET ORAL EVERY 6 HOURS PRN
Status: DISCONTINUED | OUTPATIENT
Start: 2020-02-27 | End: 2020-03-07 | Stop reason: HOSPADM

## 2020-02-27 RX ORDER — POLYETHYLENE GLYCOL 3350 17 G/17G
17 POWDER, FOR SOLUTION ORAL DAILY PRN
Status: DISCONTINUED | OUTPATIENT
Start: 2020-02-27 | End: 2020-03-07 | Stop reason: HOSPADM

## 2020-02-27 RX ORDER — ONDANSETRON 2 MG/ML
4 INJECTION INTRAMUSCULAR; INTRAVENOUS EVERY 6 HOURS PRN
Status: DISCONTINUED | OUTPATIENT
Start: 2020-02-27 | End: 2020-03-07 | Stop reason: HOSPADM

## 2020-02-27 RX ORDER — ACETAMINOPHEN 325 MG/1
650 TABLET ORAL EVERY 6 HOURS PRN
Status: DISCONTINUED | OUTPATIENT
Start: 2020-02-27 | End: 2020-03-07 | Stop reason: HOSPADM

## 2020-02-27 RX ORDER — SODIUM CHLORIDE AND POTASSIUM CHLORIDE .9; .15 G/100ML; G/100ML
SOLUTION INTRAVENOUS CONTINUOUS
Status: DISCONTINUED | OUTPATIENT
Start: 2020-02-27 | End: 2020-02-28 | Stop reason: DRUGHIGH

## 2020-02-27 RX ORDER — ACETAMINOPHEN 650 MG/1
650 SUPPOSITORY RECTAL EVERY 6 HOURS PRN
Status: DISCONTINUED | OUTPATIENT
Start: 2020-02-27 | End: 2020-03-07 | Stop reason: HOSPADM

## 2020-02-27 RX ORDER — PROMETHAZINE HYDROCHLORIDE 25 MG/ML
6.25 INJECTION, SOLUTION INTRAMUSCULAR; INTRAVENOUS EVERY 6 HOURS PRN
Status: DISCONTINUED | OUTPATIENT
Start: 2020-02-27 | End: 2020-03-07 | Stop reason: HOSPADM

## 2020-02-27 RX ORDER — PANTOPRAZOLE SODIUM 40 MG/1
40 TABLET, DELAYED RELEASE ORAL
Status: DISCONTINUED | OUTPATIENT
Start: 2020-02-28 | End: 2020-03-01

## 2020-02-27 RX ORDER — SODIUM CHLORIDE 0.9 % (FLUSH) 0.9 %
10 SYRINGE (ML) INJECTION EVERY 12 HOURS SCHEDULED
Status: DISCONTINUED | OUTPATIENT
Start: 2020-02-27 | End: 2020-03-07 | Stop reason: HOSPADM

## 2020-02-27 RX ORDER — TIZANIDINE 4 MG/1
4 TABLET ORAL 3 TIMES DAILY PRN
Status: DISCONTINUED | OUTPATIENT
Start: 2020-02-27 | End: 2020-03-07 | Stop reason: HOSPADM

## 2020-02-27 RX ORDER — 0.9 % SODIUM CHLORIDE 0.9 %
1000 INTRAVENOUS SOLUTION INTRAVENOUS ONCE
Status: COMPLETED | OUTPATIENT
Start: 2020-02-27 | End: 2020-02-27

## 2020-02-27 RX ADMIN — IOHEXOL 50 ML: 240 INJECTION, SOLUTION INTRATHECAL; INTRAVASCULAR; INTRAVENOUS; ORAL at 19:02

## 2020-02-27 RX ADMIN — POTASSIUM CHLORIDE AND SODIUM CHLORIDE: 900; 150 INJECTION, SOLUTION INTRAVENOUS at 21:41

## 2020-02-27 RX ADMIN — SODIUM CHLORIDE 1000 ML: 9 INJECTION, SOLUTION INTRAVENOUS at 14:45

## 2020-02-27 RX ADMIN — Medication 10 ML: at 21:40

## 2020-02-27 RX ADMIN — IOPAMIDOL 75 ML: 755 INJECTION, SOLUTION INTRAVENOUS at 20:52

## 2020-02-27 ASSESSMENT — PAIN DESCRIPTION - LOCATION
LOCATION: NECK;ABDOMEN
LOCATION: NECK

## 2020-02-27 ASSESSMENT — PAIN SCALES - GENERAL
PAINLEVEL_OUTOF10: 2
PAINLEVEL_OUTOF10: 0
PAINLEVEL_OUTOF10: 3

## 2020-02-27 ASSESSMENT — PAIN DESCRIPTION - PAIN TYPE: TYPE: CHRONIC PAIN;ACUTE PAIN

## 2020-02-27 NOTE — ED PROVIDER NOTES
[address] [facility]  EMERGENCY DEPARTMENTENCOUNTER      Pt Name: Melba Contreras  MRN: [id number]  [place] 1985  Date ofevaluation: 2/27/2020  Provider: Charisma Ramirez MD    CHIEF COMPLAINT       Chief Complaint   Patient presents with    Fatigue     pt states he has been feeling weak and has not been eating for approx 2 wks. denies pain       HPI    HISTORY OF PRESENT ILLNESS   (Location/Symptom, Timing/Onset,Context/Setting, Quality, Duration, Modifying Factors, Severity)  Note limiting factors. Melba Contreras is a 37 y.o. male who presents to the emergency department with generalized weakness. This is a 59-year-old male who presents with generalized weakness and inability to eat. The patient states that over the last 3 weeks he has been getting worse. The patient states he cannot eat anything without getting nauseated. He has had occasional diarrhea. He denies any pain. The patient states he is lost 25 pounds in 2 weeks. He denies any fevers or chills. He does complain of a history of some chronic neck pain but denies any significant pain at this time. NursingNotes were reviewed. Review of Systems    REVIEW OF SYSTEMS    (2-9 systems for level 4, 10 or more for level 5)     Review of Systems   Constitutional: Negative for fever. Positive for anorexia. HENT: Negative for rhinorrhea and sore throat. Eyes: Negative for redness. Respiratory: Negative for shortness of breath. Cardiovascular: Negative for chest pain. Gastrointestinal: Negative for abdominal pain. Genitourinary: Negative for flank pain. Neurological: Negative for headaches. Hematological: Negative for adenopathy. Psychiatric/Behavioral: Negative for confusion. Except as noted above the remainder of the review of systems was reviewed and negative.        PAST MEDICAL HISTORY     Past Medical History:   Diagnosis Date    Anxiety     GERD (gastroesophageal reflux sexual activity: None   Other Topics Concern    None   Social History Narrative    None       SCREENINGS             PHYSICAL EXAM    (up to 7 for level 4, 8 or more for level 5)     ED Triage Vitals [02/27/20 1202]   BP Temp Temp Source Pulse Resp SpO2 Height Weight   110/81 97.9 °F (36.6 °C) Oral 141 16 97 % 5' 11\" (1.803 m) 160 lb (72.6 kg)       Physical Exam:      General Appearance:  Alert, cooperative, appears stated age. Head:  Normocephalic, without obvious abnormality, atraumatic. Eyes:  conjunctiva/corneas clear, EOM's intact. Sclera anicteric. ENT:  Mucous membranes are dry and pink   Neck: Supple, symmetrical, trachea midline, no adenopathy. No jugular venous distention. Lungs:    Clear to auscultation bilaterally   Chest Wall:   No pain to palpation   Heart:   Regular rate rhythm with no murmurs rubs gallops   Abdomen:    Soft and benign with no guarding or rebound   Extremities:  No clubbing cyanosis or edema   Pulses:  Good pulses throughout   Skin:  No rashes or lesions to exposed skin. Neurologic: Alert and oriented X 3. DIAGNOSTIC RESULTS     EKG: All EKG's are interpreted by the Emergency Department Physician who either signs or Co-signsthis chart in the absence of a cardiologist.    Sinus tachycardia at a rate of 114 beats a minute with no acute ST elevations or depressions or pathologic Q waves. Normal axis. RADIOLOGY:   Non-plain filmimages such as CT, Ultrasound and MRI are read by the radiologist. Plain radiographic images are visualized and preliminarily interpreted by the emergency physician with the below findings:    See below    Interpretation per the Radiologist below, if available at the time ofthis note: All incidental findings were discussed with the patient.     XR CHEST PORTABLE   Final Result   Unremarkable chest.               ED BEDSIDE ULTRASOUND:   Performed by ED Physician - none    LABS:  Labs Reviewed   CBC WITH AUTO DIFFERENTIAL - Abnormal; Hyponatremia          DISPOSITION/PLAN   DISPOSITION Decision To Admit 02/27/2020 02:36:57 PM      PATIENT REFERREDTO:  No follow-up provider specified. DISCHARGEMEDICATIONS:  New Prescriptions    No medications on file     Controlled Substances Monitoring:     No flowsheet data found.     (Please note that portions of this note were completed with a voice recognition program.  Efforts were made to edit the dictations but occasionally words are mis-transcribed.)    Gina Garcia MD (electronically signed)  Attending Emergency Physician          Gina Garcia MD  02/27/20 6831

## 2020-02-27 NOTE — TELEPHONE ENCOUNTER
Reason for Disposition   SEVERE dizziness (e.g., unable to stand, requires support to walk, feels like passing out now)    Protocols used: DIZZINESS-ADULT-[place]    Received call from [facility]. Pt calling c/o dizziness and weakness. He states he was seen last week in the ED for neck pain and dizziness. Today is dizzy to the point of not being able to walk. Tried to get up and walk but had to sit back down. He is not able to eat much, states he gets very nauseous. Is drinking. Has had some blurred vision. No headache. No chest pain, no sob. Call soft transferred to Dr. Alexx Beckford, she recommends pt go to ED for further evaluation. Spoke with pt, informed or PCP recommendation. He states will have parents drive him. Please do not respond to the triage nurse through this encounter. Any subsequent communication should be directly with the patient.

## 2020-02-27 NOTE — TELEPHONE ENCOUNTER
Patients father is calling to speak with you in regards to patient being admitted to the hospital and he dropped forms off yesterday to be filled out for his sons employer . He needs to speak with you in regards to these forms and about his son . Aleah Thomas Please call . [phone].

## 2020-02-27 NOTE — ED NOTES
Pharmacy Medication History Note      List of current medications patient is taking is complete. Source of information: patient    Changes made to medication list:  Medications flagged for removal (include reason, ex. noncompliance):  N/A    Medications removed (include reason, ex. therapy complete or physician discontinued):  N/A    Medications added/doses adjusted:  N/A    Other notes (ex. Recent course of antibiotics, Coumadin dosing):  Denies use of other OTC or herbal medications. Last dose times updated. Candelaria Jacinto [facility]    No current facility-administered medications on file prior to encounter.         Current Outpatient Medications on File Prior to Encounter   Medication Sig Dispense Refill    tiZANidine (ZANAFLEX) 4 MG tablet Take 1 tablet by mouth 3 times daily as needed (PAIN) 30 tablet 1    omeprazole (PRILOSEC) 40 MG delayed release capsule TAKE ONE CAPSULE BY MOUTH DAILY 30 capsule 11

## 2020-02-27 NOTE — TELEPHONE ENCOUNTER
Dad called back because he missed call. I told him I can not give him info on pt but the paperwork I s ready for  and he is still requesting a call back.  Pt is currently admitted into hospital. Please advise

## 2020-02-27 NOTE — LETTER
[facility]  [facility]  Phone: [phone]        March 3, 2020     Patient: Zuly Pack   YOB: 1985   Date of Visit: 2/27/2020       To Whom It May Concern: It is my medical opinion that Archana Boone may return to work on 3/10/2020 with no restrictions. If you have any questions or concerns, please don't hesitate to call.     Sincerely,

## 2020-02-27 NOTE — H&P
HOSPITALISTS HISTORY AND PHYSICAL    2/27/2020 5:05 PM    Patient Information:  Magan Ayala is a 37 y.o. male [id number]  PCP:  Rhonda Altman MD (Tel: [phone] )    Chief complaint:    Chief Complaint   Patient presents with    Fatigue     pt states he has been feeling weak and has not been eating for approx 2 wks. denies pain        History of Present Illness:  Jose Beach is a 37 y.o. male presents to us with weight loss for the last 2 weeks apparently all right 2 weeks back he started having nausea vomiting abdominal discomfort could not retain anything inside the stomach with some diarrhea associated generalized weakness lost 25 pounds in 2 weeks    He has chronic neck pain he came to the ER few days back and a CTA of the head and neck was done and was unremarkable currently pain is stable    No hematemesis, melena. He had a endoscopy many years back along with colonoscopy and was diagnosed to have duodenitis    Patient has generalized weakness    In the ER was noted to have tachycardia heart rate of 125 hyponatremia serum sodium 125 patient was admitted further evaluation received 1 L of saline the ER    Denies  any fever      REVIEW OF SYSTEMS:     10 Point ROS is complete negative unless noted above. Past Medical History:   has a past medical history of Anxiety, GERD (gastroesophageal reflux disease), and IBS (irritable bowel syndrome). Past Surgical History:   has a past surgical history that includes EXCISION / BIOPSY SKIN LESION OF TRUNK (N/A, 12/18/2018). Medications:  No current facility-administered medications on file prior to encounter.       Current Outpatient Medications on File Prior to Encounter   Medication Sig Dispense Refill    tiZANidine (ZANAFLEX) 4 MG tablet Take 1 tablet by mouth 3 times daily as needed (PAIN) 30 tablet 1    omeprazole (PRILOSEC) 40

## 2020-02-28 ENCOUNTER — APPOINTMENT (OUTPATIENT)
Dept: MRI IMAGING | Age: 35
DRG: 418 | End: 2020-02-28
Payer: COMMERCIAL

## 2020-02-28 ENCOUNTER — TELEPHONE (OUTPATIENT)
Dept: FAMILY MEDICINE CLINIC | Age: 35
End: 2020-02-28

## 2020-02-28 PROBLEM — E44.0 MODERATE MALNUTRITION (HCC): Chronic | Status: ACTIVE | Noted: 2020-02-28

## 2020-02-28 LAB
A/G RATIO: 0.5 (ref 1.1–2.2)
ALBUMIN SERPL-MCNC: 2.3 G/DL (ref 3.4–5)
ALP BLD-CCNC: 85 U/L (ref 40–129)
ALT SERPL-CCNC: 58 U/L (ref 10–40)
ANION GAP SERPL CALCULATED.3IONS-SCNC: 16 MMOL/L (ref 3–16)
ANION GAP SERPL CALCULATED.3IONS-SCNC: 16 MMOL/L (ref 3–16)
AST SERPL-CCNC: 83 U/L (ref 15–37)
BILIRUB SERPL-MCNC: 0.6 MG/DL (ref 0–1)
BUN BLDV-MCNC: 13 MG/DL (ref 7–20)
BUN BLDV-MCNC: 15 MG/DL (ref 7–20)
CALCIUM SERPL-MCNC: 8.1 MG/DL (ref 8.3–10.6)
CALCIUM SERPL-MCNC: 8.5 MG/DL (ref 8.3–10.6)
CHLORIDE BLD-SCNC: 90 MMOL/L (ref 99–110)
CHLORIDE BLD-SCNC: 91 MMOL/L (ref 99–110)
CHLORIDE URINE RANDOM: 36 MMOL/L
CO2: 22 MMOL/L (ref 21–32)
CO2: 23 MMOL/L (ref 21–32)
CORTISOL - AM: 28.3 UG/DL (ref 4.3–22.4)
CREAT SERPL-MCNC: 0.7 MG/DL (ref 0.9–1.3)
CREAT SERPL-MCNC: 0.8 MG/DL (ref 0.9–1.3)
FERRITIN: 2530 NG/ML (ref 30–400)
GFR AFRICAN AMERICAN: >60
GFR AFRICAN AMERICAN: >60
GFR NON-AFRICAN AMERICAN: >60
GFR NON-AFRICAN AMERICAN: >60
GLOBULIN: 4.5 G/DL
GLUCOSE BLD-MCNC: 102 MG/DL (ref 70–99)
GLUCOSE BLD-MCNC: 94 MG/DL (ref 70–99)
HCT VFR BLD CALC: 38.4 % (ref 40.5–52.5)
HEMOGLOBIN: 12.9 G/DL (ref 13.5–17.5)
HEPATITIS B CORE IGM ANTIBODY: NORMAL
HEPATITIS B SURFACE ANTIGEN INTERPRETATION: NORMAL
HEPATITIS C ANTIBODY INTERPRETATION: NORMAL
IRON SATURATION: 23 % (ref 20–50)
IRON: 34 UG/DL (ref 59–158)
MCH RBC QN AUTO: 28.7 PG (ref 26–34)
MCHC RBC AUTO-ENTMCNC: 33.5 G/DL (ref 31–36)
MCV RBC AUTO: 85.5 FL (ref 80–100)
OSMOLALITY URINE: 727 MOSM/KG (ref 390–1070)
OSMOLALITY: 266 MOSM/KG (ref 275–295)
PDW BLD-RTO: 13.3 % (ref 12.4–15.4)
PLATELET # BLD: 295 K/UL (ref 135–450)
PMV BLD AUTO: 8.3 FL (ref 5–10.5)
POTASSIUM REFLEX MAGNESIUM: 4.5 MMOL/L (ref 3.5–5.1)
POTASSIUM SERPL-SCNC: 4.3 MMOL/L (ref 3.5–5.1)
POTASSIUM, UR: 33.4 MMOL/L
RBC # BLD: 4.49 M/UL (ref 4.2–5.9)
SODIUM BLD-SCNC: 128 MMOL/L (ref 136–145)
SODIUM BLD-SCNC: 130 MMOL/L (ref 136–145)
SODIUM URINE: 61 MMOL/L
TOTAL IRON BINDING CAPACITY: 148 UG/DL (ref 260–445)
TOTAL PROTEIN: 6.8 G/DL (ref 6.4–8.2)
TSH REFLEX: 1.26 UIU/ML (ref 0.27–4.2)
WBC # BLD: 8.4 K/UL (ref 4–11)

## 2020-02-28 PROCEDURE — 97530 THERAPEUTIC ACTIVITIES: CPT

## 2020-02-28 PROCEDURE — 82728 ASSAY OF FERRITIN: CPT

## 2020-02-28 PROCEDURE — 2580000003 HC RX 258: Performed by: INTERNAL MEDICINE

## 2020-02-28 PROCEDURE — 86803 HEPATITIS C AB TEST: CPT

## 2020-02-28 PROCEDURE — 83550 IRON BINDING TEST: CPT

## 2020-02-28 PROCEDURE — 86705 HEP B CORE ANTIBODY IGM: CPT

## 2020-02-28 PROCEDURE — 84443 ASSAY THYROID STIM HORMONE: CPT

## 2020-02-28 PROCEDURE — 6360000002 HC RX W HCPCS: Performed by: INTERNAL MEDICINE

## 2020-02-28 PROCEDURE — 83540 ASSAY OF IRON: CPT

## 2020-02-28 PROCEDURE — 74183 MRI ABD W/O CNTR FLWD CNTR: CPT

## 2020-02-28 PROCEDURE — 6360000004 HC RX CONTRAST MEDICATION: Performed by: INTERNAL MEDICINE

## 2020-02-28 PROCEDURE — 85027 COMPLETE CBC AUTOMATED: CPT

## 2020-02-28 PROCEDURE — 2500000003 HC RX 250 WO HCPCS: Performed by: INTERNAL MEDICINE

## 2020-02-28 PROCEDURE — 87340 HEPATITIS B SURFACE AG IA: CPT

## 2020-02-28 PROCEDURE — 80053 COMPREHEN METABOLIC PANEL: CPT

## 2020-02-28 PROCEDURE — 82390 ASSAY OF CERULOPLASMIN: CPT

## 2020-02-28 PROCEDURE — 36415 COLL VENOUS BLD VENIPUNCTURE: CPT

## 2020-02-28 PROCEDURE — 1200000000 HC SEMI PRIVATE

## 2020-02-28 PROCEDURE — 86704 HEP B CORE ANTIBODY TOTAL: CPT

## 2020-02-28 PROCEDURE — 97161 PT EVAL LOW COMPLEX 20 MIN: CPT

## 2020-02-28 PROCEDURE — A9581 GADOXETATE DISODIUM INJ: HCPCS | Performed by: INTERNAL MEDICINE

## 2020-02-28 RX ORDER — SODIUM CHLORIDE 9 MG/ML
INJECTION, SOLUTION INTRAVENOUS ONCE
Status: COMPLETED | OUTPATIENT
Start: 2020-02-28 | End: 2020-02-28

## 2020-02-28 RX ADMIN — SODIUM CHLORIDE: 9 INJECTION, SOLUTION INTRAVENOUS at 21:08

## 2020-02-28 RX ADMIN — ONDANSETRON 4 MG: 2 INJECTION INTRAMUSCULAR; INTRAVENOUS at 21:23

## 2020-02-28 RX ADMIN — Medication 10 ML: at 09:21

## 2020-02-28 RX ADMIN — Medication 10 ML: at 21:23

## 2020-02-28 RX ADMIN — GADOXETATE DISODIUM 7 ML: 181.43 INJECTION, SOLUTION INTRAVENOUS at 21:07

## 2020-02-28 RX ADMIN — POTASSIUM CHLORIDE: 2 INJECTION, SOLUTION, CONCENTRATE INTRAVENOUS at 03:10

## 2020-02-28 RX ADMIN — POTASSIUM CHLORIDE: 2 INJECTION, SOLUTION, CONCENTRATE INTRAVENOUS at 22:19

## 2020-02-28 ASSESSMENT — PAIN DESCRIPTION - PAIN TYPE: TYPE: ACUTE PAIN

## 2020-02-28 ASSESSMENT — PAIN DESCRIPTION - DESCRIPTORS: DESCRIPTORS: ACHING

## 2020-02-28 ASSESSMENT — PAIN DESCRIPTION - FREQUENCY: FREQUENCY: CONTINUOUS

## 2020-02-28 ASSESSMENT — PAIN DESCRIPTION - ORIENTATION: ORIENTATION: MID

## 2020-02-28 ASSESSMENT — PAIN DESCRIPTION - LOCATION: LOCATION: BACK

## 2020-02-28 ASSESSMENT — PAIN - FUNCTIONAL ASSESSMENT: PAIN_FUNCTIONAL_ASSESSMENT: ACTIVITIES ARE NOT PREVENTED

## 2020-02-28 ASSESSMENT — PAIN DESCRIPTION - ONSET: ONSET: OTHER (COMMENT)

## 2020-02-28 ASSESSMENT — PAIN DESCRIPTION - PROGRESSION: CLINICAL_PROGRESSION: GRADUALLY IMPROVING

## 2020-02-28 ASSESSMENT — PAIN SCALES - GENERAL: PAINLEVEL_OUTOF10: 5
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
## 2020-02-28 NOTE — PROGRESS NOTES
Office : [phone]     Fax :[phone]       Nephrology Consult Note      Patient's Name: Crystal Campoverde  12:25 PM  2/28/2020    Reason for Consult:  Hyponatremia       Requesting Physician:  Chele Patton MD      Chief Complaint:    Chief Complaint   Patient presents with    Fatigue     pt states he has been feeling weak and has not been eating for approx 2 wks. denies pain         History of Present iIlness:    Crystal Campoverde is a 37 y.o. male with h/o GERD, duodenitis, was admitted with weakness. He has had poor oral intake last few days. Initial lab work showed sodium levels of 123. Clinically looks dehydrated   CT scan showed one lesion on liver. Getting further workup for that . GI following. Sodium level improving     Past Medical History:   Diagnosis Date    Anxiety     GERD (gastroesophageal reflux disease)     IBS (irritable bowel syndrome)        Past Surgical History:   Procedure Laterality Date    COLONOSCOPY      EXCISION / BIOPSY SKIN LESION OF TRUNK N/A 12/18/2018    EXCISION OF SEBACEOUS CYST ON BACK performed by Gary Cruz MD at [place] [address]. History   Problem Relation Age of Onset    Hypertension Father     Cancer Father     Cancer Other         reports that he has been smoking cigarettes. He has a 11.50 pack-year smoking history. He has quit using smokeless tobacco.  His smokeless tobacco use included chew. He reports current alcohol use. He reports that he does not use drugs.     Allergies:  Amoxicillin-pot clavulanate; Citalopram; Midrin [apap-isometheptene-dichloral]; and Penicillins    Current Medications:    potassium chloride 20 mEq, sodium bicarbonate 50 mEq in sodium chloride 0.9 % 1,000 mL infusion, Continuous  pantoprazole [id number] in the last 72 hours. INR: No results for input(s): INR in the last 72 hours. UA:No results for input(s): Robert Boo, GLUCOSEU, BILIRUBINUR, Darnelle Hocking, BLOODU, PHUR, PROTEINU, UROBILINOGEN, NITRU, LEUKOCYTESUR, LABMICR, URINETYPE in the last 72 hours. Urine Microscopic: No results for input(s): LABCAST, BACTERIA, COMU, HYALCAST, WBCUA, RBCUA, EPIU in the last 72 hours. Urine Culture: No results for input(s): LABURIN in the last 72 hours. Urine Chemistry: No results for input(s): Radha Ferdinand, PROTEINUR, NAUR in the last 72 hours. IMAGING:  CT ABDOMEN PELVIS W IV CONTRAST Additional Contrast? Oral   Final Result   Chest CT: No acute or suspicious abnormality identified. Abdomen and pelvis CT: Heterogeneous hypodensity within the left hepatic lobe   anteriorly and inferiorly. That may be secondary to heterogeneous fatty   infiltration versus focal inflammation. Neoplasm does remain a concern. Further evaluation with a dedicated hepatic MRI recommended. Periportal edema. That is a nonspecific finding, and can be secondary to   localized disease (such as hepatitis), but can also be secondary to systemic   etiologies (such as third-spacing). CT CHEST W CONTRAST   Final Result   Chest CT: No acute or suspicious abnormality identified. Abdomen and pelvis CT: Heterogeneous hypodensity within the left hepatic lobe   anteriorly and inferiorly. That may be secondary to heterogeneous fatty   infiltration versus focal inflammation. Neoplasm does remain a concern. Further evaluation with a dedicated hepatic MRI recommended. Periportal edema. That is a nonspecific finding, and can be secondary to   localized disease (such as hepatitis), but can also be secondary to systemic   etiologies (such as third-spacing).          XR CHEST PORTABLE   Final Result   Unremarkable chest.         MRI ABDOMEN W WO CONTRAST    (Results Pending)     No intake/output data

## 2020-02-28 NOTE — PROGRESS NOTES
Hospitalist Progress Note      PCP: Lenore Varela MD    Date of Admission: 2/27/2020    Chief Complaint: Weakness and 20 pound weight loss    Hospital Course: This is a 67-year-old male with a history of gastroparesis followed by [facility] ,admitted with the weakness weight loss of 20 pounds since 2/11/2020 found to be hyponatremia    Subjective: Patient complains of shortness of breath lightheaded dizziness weight loss over 20 pounds since 2/11/2020 history of a duodenitis followed by GI for  years      Medications:  Reviewed    Infusion Medications    IV infusion builder 75 mL/hr at 02/28/20 0310     Scheduled Medications    pantoprazole  40 mg Oral QAM AC    sodium chloride flush  10 mL Intravenous 2 times per day    enoxaparin  40 mg Subcutaneous Nightly     PRN Meds: tiZANidine, promethazine, sodium chloride flush, acetaminophen, acetaminophen, polyethylene glycol, promethazine, ondansetron    No intake or output data in the 24 hours ending 02/28/20 6488    Physical Exam Performed:    BP 95/70   Pulse 110   Temp 99.2 °F (37.3 °C) (Oral)   Resp 18   Ht 5' 11\" (1.803 m)   Wt 159 lb 9.6 oz (72.4 kg)   SpO2 99%   BMI 22.26 kg/m²     General appearance: No apparent distress, appears stated age and cooperative. HEENT: Pupils equal, round, and reactive to light. Conjunctivae/corneas clear. Neck: Supple, with full range of motion. No jugular venous distention. Trachea midline. Respiratory:  Normal respiratory effort. Clear to auscultation, bilaterally without Rales/Wheezes/Rhonchi. Cardiovascular: Regular rate and rhythm with normal S1/S2 without murmurs, rubs or gallops. Abdomen: Soft, non-tender, non-distended with normal bowel sounds. Musculoskeletal: No clubbing, cyanosis or edema bilaterally. Full range of motion without deformity. Skin: Skin color, texture, turgor normal.  No rashes or lesions. Neurologic:  Neurovascularly intact without any focal sensory/motor deficits.  Cranial nerves: II-XII intact, grossly non-focal.  Psychiatric: Alert and oriented, thought content appropriate, normal insight  Capillary Refill: Brisk,< 3 seconds   Peripheral Pulses: +2 palpable, equal bilaterally       Labs:   Recent Labs     02/27/20  1242 02/28/20  0535   WBC 9.0 8.4   HGB 14.6 12.9*   HCT 43.1 38.4*    295     Recent Labs     02/27/20  1242 02/27/20  2324 02/28/20  0535   * 123* 128*   K 4.1 4.6 4.5   CL 84* 84* 90*   CO2 23 24 22   BUN 17 15 15   CREATININE 0.9 0.9 0.8*   CALCIUM 8.6 8.2* 8.1*     Recent Labs     02/27/20  1242 02/28/20  0535   AST 88* 83*   ALT 63* 58*   BILITOT 0.7 0.6   ALKPHOS 93 85     No results for input(s): INR in the last 72 hours. Recent Labs     02/27/20  1242   TROPONINI <0.01       Urinalysis:    No results found for: Yobani [place], BACTERIA, RBCUA, BLOODU, SPECGRAV, GLUCOSEU    Radiology:  CT ABDOMEN PELVIS W IV CONTRAST Additional Contrast? Oral   Final Result   Chest CT: No acute or suspicious abnormality identified. Abdomen and pelvis CT: Heterogeneous hypodensity within the left hepatic lobe   anteriorly and inferiorly. That may be secondary to heterogeneous fatty   infiltration versus focal inflammation. Neoplasm does remain a concern. Further evaluation with a dedicated hepatic MRI recommended. Periportal edema. That is a nonspecific finding, and can be secondary to   localized disease (such as hepatitis), but can also be secondary to systemic   etiologies (such as third-spacing). CT CHEST W CONTRAST   Final Result   Chest CT: No acute or suspicious abnormality identified. Abdomen and pelvis CT: Heterogeneous hypodensity within the left hepatic lobe   anteriorly and inferiorly. That may be secondary to heterogeneous fatty   infiltration versus focal inflammation. Neoplasm does remain a concern. Further evaluation with a dedicated hepatic MRI recommended. Periportal edema.   That is a nonspecific finding, and can be secondary to   localized disease (such as hepatitis), but can also be secondary to systemic   etiologies (such as third-spacing). XR CHEST PORTABLE   Final Result   Unremarkable chest.         MRI ABDOMEN W WO CONTRAST    (Results Pending)           Assessment/Plan:    Active Hospital Problems    Diagnosis    Hyponatremia [E87.1]     1. Hyponatremia nephrology consulted and following nephrology consult appreciated continue with IV hydration. 2.  Weight loss, GERD abnormal CT of the liver GI consulted and following. MRI of the liver. 3.  Abnormal liver function test prior work-up gallbladder ultrasound, celiac disease has been negative, acute hepatitis panel pending .   4.  H/O chronic neck pain and migraine heaeache continue with home meds, has been seen by neurologist in the past.    DVT Prophylaxis: Lovenox subcu  Diet: Diet NPO, After Midnight  Code Status: Full Code    PT/OT Eval Status:     Shama Doyle MD

## 2020-02-28 NOTE — PLAN OF CARE
Nutrition Problem:  Moderate malnutrition  Intervention: Food and/or Nutrient Delivery: Continue NPO  Nutritional Goals: advance of nutrition within next 48 hr

## 2020-02-28 NOTE — PROGRESS NOTES
Nutrition Assessment    Type and Reason for Visit: Initial, Positive Nutrition Screen    Nutrition Recommendations:   Diet adv as yaakov to low fiber  Ensure Clear TID as tolerated    Nutrition Assessment: Pt is nutritionally compromised as evidenced by recent poor po intake and wt loss. Pt tells history of having a stomach virus 4 yrs ago and losing 70lb at that time. UBW was 200lb and dropped to 130lb during illness. He has been regaining wt and new CBW became 180lb, pt was tolerating food and having formed BMs. Most recently (since Feb 11th) pt reports increased neck pain which has contributed to a decrease in po intake and appetite and with more diarrhea. Now even though appetite has returned, pt reports only being able to tolerate a very small amount of food; complaining of nausea. Current diet level is NPO and admission wt was 159lb. Discussed oral nutrition supplements when diet advanced. Malnutrition Assessment:  · Malnutrition Status: Meets the criteria for moderate malnutrition  · Context: Acute illness or injury  · Findings of the 6 clinical characteristics of malnutrition (Minimum of 2 out of 6 clinical characteristics is required to make the diagnosis of moderate or severe Protein Calorie Malnutrition based on AND/ASPEN Guidelines):  1. Energy Intake-Less than or equal to 50% of estimated energy requirement, Greater than or equal to 7 days    2. Weight Loss-7.5% loss or greater, in 1 week  3. Fat Loss-Mild subcutaneous fat loss, Triceps  4. Muscle Loss-Mild muscle mass loss, Clavicles (pectoralis and deltoids), Calf (gastrocnemius)  5. Fluid Accumulation-No significant fluid accumulation,    6.  Strength-Not measured    Nutrition Risk Level: High    Nutrient Needs:  · Estimated Daily Total Kcal: 1800 - 2160  · Estimated Daily Protein (g): 86 - 108  · Estimated Daily Total Fluid (ml/day): 1 mL/kcal    Nutrition Diagnosis:   · Problem:  Moderate malnutrition  · Etiology: related to Insufficient energy/nutrient consumption, Nausea     Signs and symptoms:  as evidenced by Patient report of, Diet history of poor intake, Weight loss greater than or equal to 2% in 1 week    Objective Information:  · Nutrition-Focused Physical Findings: c/o abd bloating  · Wound Type: None  · Current Nutrition Therapies:  · Oral Diet Orders: NPO   · Anthropometric Measures:  · Ht: 5' 11\" (180.3 cm)   · Current Body Wt: 159 lb (72.1 kg)  · % Weight Change:    2/20/20 175lb; indicating a loss of 16lb/9% in 1 wk  · Ideal Body Wt: 172 lb (78 kg)   · BMI Classification: BMI 18.5 - 24.9 Normal Weight    Nutrition Interventions:   Continue NPO  Continued Inpatient Monitoring, Education not appropriate at this time    Nutrition Evaluation:   · Evaluation: Goals set   · Goals: advance of nutrition within next 48 hr    · Monitoring: Nutrition Progression, Diet Tolerance, Nausea or Vomiting      Electronically signed by Torie Chapman RD, LD on 2/28/20 at 3:20 PM  Contact Number: [phone]

## 2020-02-28 NOTE — PROGRESS NOTES
week       Patient Diagnosis(es): The encounter diagnosis was Hyponatremia. has a past medical history of Anxiety, GERD (gastroesophageal reflux disease), and IBS (irritable bowel syndrome). has a past surgical history that includes EXCISION / BIOPSY SKIN LESION OF TRUNK (N/A, 12/18/2018) and Colonoscopy. Restrictions  Restrictions/Precautions  Restrictions/Precautions: Up Ad Sarah Beth  Required Braces or Orthoses?: No  Vision/Hearing  Vision: Within Functional Limits  Hearing: Within functional limits     Subjective  General  Chart Reviewed: Yes  Patient assessed for rehabilitation services?: Yes  Additional Pertinent Hx: Pt reports he has had neck pain on and off for several months. Had an MD appt for today with spine doc and and OP PT appt next week to address it. Not having neck pain today  Family / Caregiver Present: Yes  Follows Commands: Within Functional Limits  Subjective  Subjective: Pt reports the bed is killing his back. Denies pain in neck this date, states it feels pretty good, but his back is hurting due to the bed. Could not find a comfortable position in bed other than sidelying which would trigger his IV .   Pain Screening  Patient Currently in Pain: Yes  Pain Assessment  Pain Assessment: 0-10  Pain Level: 5  Pain Type: Acute pain  Pain Location: Back  Pain Orientation: Mid  Pain Descriptors: Aching  Pain Frequency: Continuous  Pain Onset: Other (Comment)(states it is from lying in bed)  Clinical Progression: Gradually improving  Functional Pain Assessment: Activities are not prevented  Non-Pharmaceutical Pain Intervention(s): Repositioned  Vital Signs  Patient Currently in Pain: Yes       Orientation  Orientation  Overall Orientation Status: Within Normal Limits  Social/Functional History  Social/Functional History  Lives With: Alone  Type of Home: Apartment  Home Layout: One level  Home Access: Stairs to enter with rails  Entrance Stairs - Number of Steps: 10  Entrance Stairs - Rails: Right  Bathroom Toilet: Standard  Receives Help From: Family  ADL Assistance: Independent  Homemaking Assistance: Independent  Homemaking Responsibilities: Yes  Ambulation Assistance: Independent  Transfer Assistance: Independent  Active : Yes  Mode of Transportation: Car  Occupation: Full time employment  Type of occupation: security gaurd for [employer]   Cognition  Overall Cognitive Status: WNL    Objective     Observation/Palpation  Posture: Fair(forward head, slightly rounded shoulders)    AROM RLE (degrees)  RLE AROM: WNL  AROM LLE (degrees)  LLE AROM : WNL  Strength RLE  Strength RLE: WNL  Strength LLE  Strength LLE: WNL  Strength RUE  Strength RUE: WFL  Strength LUE  Strength LUE: WFL  Motor Control  Gross Motor?: WNL  Coordination  Rapid Alternating Movements: Normal  Sensation  Overall Sensation Status: WNL  Bed mobility  Bridging: Independent  Rolling to Right: Independent  Supine to Sit: Independent  Sit to Supine: Independent  Scooting: Independent  Transfers  Sit to Stand: Independent  Stand to sit:  Independent  Ambulation  Ambulation?: Yes  WB Status: no restrictions  Ambulation 1  Surface: level tile  Device: No Device  Assistance: Independent  Quality of Gait: normal gait pattern  Gait Deviations: None  Distance: 50  Comments: pt walking around room without issue, reports he has only been walking to/from bathroom since admit  Stairs/Curb  Stairs?: No(anticipate ind)     Balance  Posture: Good  Sitting - Static: Good  Sitting - Dynamic: Good  Standing - Static: Good  Standing - Dynamic: Good        Plan   Plan  Times per week: no further PT  Safety Devices  Type of devices: Nurse notified  Restraints  Initially in place: No                                                  AM-PAC Score  AM-PAC Inpatient Mobility Raw Score : 24 (02/28/20 1007)  AM-PAC Inpatient T-Scale Score : 61.14 (02/28/20 1007)  Mobility Inpatient CMS 0-100% Score: 0 (02/28/20 1007)  Mobility Inpatient CMS G-Code Modifier :  (02/28/20 1007)                    Therapy Time   Individual Concurrent Group Co-treatment   Time In 0933         Time Out 0959         Minutes 26         Timed Code Treatment Minutes: James 21, [place], [id number]

## 2020-02-28 NOTE — PROGRESS NOTES
Verified with Dr. Nikki Langley that new order for KCL 20mEq and sodium bicarbonate 50mEq in NaCL 0.9% at 75ml/hr is the intended order for continuous fluids.

## 2020-02-29 LAB
ALBUMIN SERPL-MCNC: 2.3 G/DL (ref 3.4–5)
ALP BLD-CCNC: 82 U/L (ref 40–129)
ALT SERPL-CCNC: 43 U/L (ref 10–40)
ANION GAP SERPL CALCULATED.3IONS-SCNC: 14 MMOL/L (ref 3–16)
AST SERPL-CCNC: 40 U/L (ref 15–37)
BILIRUB SERPL-MCNC: 0.7 MG/DL (ref 0–1)
BILIRUBIN DIRECT: 0.3 MG/DL (ref 0–0.3)
BILIRUBIN, INDIRECT: 0.4 MG/DL (ref 0–1)
BUN BLDV-MCNC: 11 MG/DL (ref 7–20)
CALCIUM SERPL-MCNC: 8.2 MG/DL (ref 8.3–10.6)
CHLORIDE BLD-SCNC: 96 MMOL/L (ref 99–110)
CO2: 22 MMOL/L (ref 21–32)
CREAT SERPL-MCNC: 0.7 MG/DL (ref 0.9–1.3)
GFR AFRICAN AMERICAN: >60
GFR NON-AFRICAN AMERICAN: >60
GLUCOSE BLD-MCNC: 100 MG/DL (ref 70–99)
HEPATITIS B CORE TOTAL ANTIBODY: NEGATIVE
POTASSIUM SERPL-SCNC: 4.6 MMOL/L (ref 3.5–5.1)
SODIUM BLD-SCNC: 132 MMOL/L (ref 136–145)
TOTAL PROTEIN: 6.8 G/DL (ref 6.4–8.2)

## 2020-02-29 PROCEDURE — 6370000000 HC RX 637 (ALT 250 FOR IP): Performed by: INTERNAL MEDICINE

## 2020-02-29 PROCEDURE — 80076 HEPATIC FUNCTION PANEL: CPT

## 2020-02-29 PROCEDURE — 87336 ENTAMOEB HIST DISPR AG IA: CPT

## 2020-02-29 PROCEDURE — 87505 NFCT AGENT DETECTION GI: CPT

## 2020-02-29 PROCEDURE — 80048 BASIC METABOLIC PNL TOTAL CA: CPT

## 2020-02-29 PROCEDURE — 1200000000 HC SEMI PRIVATE

## 2020-02-29 PROCEDURE — 87328 CRYPTOSPORIDIUM AG IA: CPT

## 2020-02-29 PROCEDURE — 2580000003 HC RX 258: Performed by: INTERNAL MEDICINE

## 2020-02-29 RX ADMIN — Medication 10 ML: at 22:10

## 2020-02-29 RX ADMIN — PANTOPRAZOLE SODIUM 40 MG: 40 TABLET, DELAYED RELEASE ORAL at 05:42

## 2020-02-29 RX ADMIN — Medication 15 G: at 16:36

## 2020-02-29 RX ADMIN — Medication 10 ML: at 09:48

## 2020-02-29 ASSESSMENT — PAIN SCALES - GENERAL
PAINLEVEL_OUTOF10: 5
PAINLEVEL_OUTOF10: 0

## 2020-02-29 NOTE — PROGRESS NOTES
Hospital Medicine Progress Note     Date:  2/29/2020    PCP: Tess Bolanos MD (Tel: [phone])    Date of Admission: 2/27/2020      Brief hospital course: This is a 70-year-old male with a history of gastroparesis followed by [address] ,admitted with the weakness weight loss of 20 pounds since 2/11/2020 found to be hyponatremia        Subjective  Pt states he feels better, still having a lot of belching and some nausea, was able to tolerate some of his clear liquids today, father is at bedside. Objective  Physical exam:  Vitals: /73   Pulse 103   Temp 98.9 °F (37.2 °C)   Resp 16   Ht 5' 11\" (1.803 m)   Wt 159 lb 9.6 oz (72.4 kg)   SpO2 96%   BMI 22.26 kg/m²   Gen: Not in distress. Alert. Head: Normocephalic. Atraumatic. Eyes: EOMI. Good acuity. ENT: Oral mucosa moist  Neck: No JVD. No obvious thyromegaly. CVS: Nml S1S2, no MRG, RRR  Pulmomary: Clear bilaterally. No crackles. No wheezes. Gastrointestinal: Soft, NT/ND. Positive bowel sounds. Musculoskeletal: No edema. Warm  Neuro: No focal deficit. Moves extremity spontaneously. Psychiatry: Appropriate affect. Not agitated. Skin: Warm, dry with normal turgor. No rash      24HR INTAKE/OUTPUT:  No intake or output data in the 24 hours ending 02/29/20 1616  No intake/output data recorded. No intake/output data recorded.       Meds:    urea  15 g Oral Once    pantoprazole  40 mg Oral QAM AC    sodium chloride flush  10 mL Intravenous 2 times per day    enoxaparin  40 mg Subcutaneous Nightly       Infusions:       PRN Meds: tiZANidine, promethazine, sodium chloride flush, acetaminophen, acetaminophen, polyethylene glycol, promethazine, ondansetron    Labs/imaging:  CBC:   Recent Labs     02/27/20  1242 02/28/20  0535   WBC 9.0 8.4   HGB 14.6 12.9*    295         BMP:    Recent Labs     02/28/20  0535 02/28/20  1032 02/29/20  0639   * 130* 132*   K 4.5 4.3 4.6   CL 90* 91* 96*   CO2 22 23 22   BUN 15 13 11   CREATININE

## 2020-02-29 NOTE — PROGRESS NOTES
1. Mild periportal edema left hepatic lobe possibly related to hepatitis, but   this can be seen with segmental atrophy of the liver (correlate with remote   history of trauma in this region). 2. Nonspecific mild central left hepatic bile duct dilatation. Suboptimally   evaluated without MRCP series. ERCP or MRCP correlation may be useful for   additional characterization. I suspect this to reflect segmental atrophy of   the liver, possibly remote posttraumatic. Focal cholangitis is a   consideration. The patient's age as well as absence of underlying   enhancement abnormality makes the consideration of malignant etiology (such   as cholangiocarcinoma) very unlikely. 3. Nonspecific gerri hepatis and portacaval lymph node enlargement. Involvement with malignancy is a consideration though these may simply be   reactive. 4. No cholelithiasis is evident, however findings are seen which can reflect   cholecystitis. CT ABDOMEN PELVIS W IV CONTRAST Additional Contrast? Oral   Final Result   Chest CT: No acute or suspicious abnormality identified. Abdomen and pelvis CT: Heterogeneous hypodensity within the left hepatic lobe   anteriorly and inferiorly. That may be secondary to heterogeneous fatty   infiltration versus focal inflammation. Neoplasm does remain a concern. Further evaluation with a dedicated hepatic MRI recommended. Periportal edema. That is a nonspecific finding, and can be secondary to   localized disease (such as hepatitis), but can also be secondary to systemic   etiologies (such as third-spacing). CT CHEST W CONTRAST   Final Result   Chest CT: No acute or suspicious abnormality identified. Abdomen and pelvis CT: Heterogeneous hypodensity within the left hepatic lobe   anteriorly and inferiorly. That may be secondary to heterogeneous fatty   infiltration versus focal inflammation. Neoplasm does remain a concern.    Further evaluation with a dedicated

## 2020-02-29 NOTE — PROGRESS NOTES
Shift assessment complete. VSS. Scheduled medications given. hygeine products given at this time. Family at bedside. Will continue to monitor. Call light in reach.

## 2020-03-01 LAB
ALBUMIN SERPL-MCNC: 2.5 G/DL (ref 3.4–5)
ALP BLD-CCNC: 97 U/L (ref 40–129)
ALT SERPL-CCNC: 49 U/L (ref 10–40)
AMYLASE: 163 U/L (ref 25–115)
ANION GAP SERPL CALCULATED.3IONS-SCNC: 16 MMOL/L (ref 3–16)
AST SERPL-CCNC: 57 U/L (ref 15–37)
BILIRUB SERPL-MCNC: 0.7 MG/DL (ref 0–1)
BILIRUBIN DIRECT: 0.3 MG/DL (ref 0–0.3)
BILIRUBIN, INDIRECT: 0.4 MG/DL (ref 0–1)
BUN BLDV-MCNC: 13 MG/DL (ref 7–20)
CALCIUM SERPL-MCNC: 8.8 MG/DL (ref 8.3–10.6)
CHLORIDE BLD-SCNC: 96 MMOL/L (ref 99–110)
CO2: 24 MMOL/L (ref 21–32)
CREAT SERPL-MCNC: 0.7 MG/DL (ref 0.9–1.3)
GFR AFRICAN AMERICAN: >60
GFR NON-AFRICAN AMERICAN: >60
GLUCOSE BLD-MCNC: 100 MG/DL (ref 70–99)
HCT VFR BLD CALC: 39.2 % (ref 40.5–52.5)
HEMOGLOBIN: 12.9 G/DL (ref 13.5–17.5)
LIPASE: 91 U/L (ref 13–60)
MCH RBC QN AUTO: 28.7 PG (ref 26–34)
MCHC RBC AUTO-ENTMCNC: 32.8 G/DL (ref 31–36)
MCV RBC AUTO: 87.4 FL (ref 80–100)
PDW BLD-RTO: 13.7 % (ref 12.4–15.4)
PLATELET # BLD: 325 K/UL (ref 135–450)
PMV BLD AUTO: 8.6 FL (ref 5–10.5)
POTASSIUM SERPL-SCNC: 4.9 MMOL/L (ref 3.5–5.1)
RBC # BLD: 4.49 M/UL (ref 4.2–5.9)
SODIUM BLD-SCNC: 136 MMOL/L (ref 136–145)
TOTAL PROTEIN: 7.1 G/DL (ref 6.4–8.2)
WBC # BLD: 8.1 K/UL (ref 4–11)

## 2020-03-01 PROCEDURE — 83690 ASSAY OF LIPASE: CPT

## 2020-03-01 PROCEDURE — 36415 COLL VENOUS BLD VENIPUNCTURE: CPT

## 2020-03-01 PROCEDURE — 82150 ASSAY OF AMYLASE: CPT

## 2020-03-01 PROCEDURE — 85027 COMPLETE CBC AUTOMATED: CPT

## 2020-03-01 PROCEDURE — 86301 IMMUNOASSAY TUMOR CA 19-9: CPT

## 2020-03-01 PROCEDURE — 2580000003 HC RX 258: Performed by: INTERNAL MEDICINE

## 2020-03-01 PROCEDURE — 1200000000 HC SEMI PRIVATE

## 2020-03-01 PROCEDURE — 80048 BASIC METABOLIC PNL TOTAL CA: CPT

## 2020-03-01 PROCEDURE — 80076 HEPATIC FUNCTION PANEL: CPT

## 2020-03-01 RX ORDER — PANTOPRAZOLE SODIUM 40 MG/1
40 TABLET, DELAYED RELEASE ORAL
Status: DISCONTINUED | OUTPATIENT
Start: 2020-03-02 | End: 2020-03-07 | Stop reason: HOSPADM

## 2020-03-01 RX ADMIN — Medication 10 ML: at 08:15

## 2020-03-01 ASSESSMENT — PAIN SCALES - GENERAL
PAINLEVEL_OUTOF10: 0

## 2020-03-01 NOTE — PROGRESS NOTES
Bedside report given with DIRK mckay at this time. Patient resting in bed and denies any needs. Call light in reach.

## 2020-03-01 NOTE — PROGRESS NOTES
[facility] GI  Gastroenterology Progress Note  Jamie Tee is a 37 y.o. male patient. 1. Hyponatremia        SUBJECTIVE:  Na continues to improve. Still has anorexia and some nausea. Denies abd pain. Physical    VITALS:  /75   Pulse 112   Temp 98.7 °F (37.1 °C) (Oral)   Resp 16   Ht 5' 11\" (1.803 m)   Wt 159 lb 9.6 oz (72.4 kg)   SpO2 94%   BMI 22.26 kg/m²   TEMPERATURE:  Current - Temp: 98.7 °F (37.1 °C); Max - Temp  Av.3 °F (37.4 °C)  Min: 98.7 °F (37.1 °C)  Max: 99.9 °F (37.7 °C)    Abdomen soft, ND, some RUQ tender to deep palpation, no HSM, Bowel sounds normal     Data      Recent Labs     20  1242 20  0535 20  0606   WBC 9.0 8.4 8.1   HGB 14.6 12.9* 12.9*   HCT 43.1 38.4* 39.2*   MCV 85.2 85.5 87.4    295 325     Recent Labs     20  1032 20  0639 20  0605   * 132* 136   K 4.3 4.6 4.9   CL 91* 96* 96*   CO2 23 22 24   BUN 13 11 13   CREATININE 0.7* 0.7* 0.7*     Recent Labs     20  0535 20  0639 20  0605   AST 83* 40* 57*   ALT 58* 43* 49*   BILIDIR  --  0.3 0.3   BILITOT 0.6 0.7 0.7   ALKPHOS 85 82 97     Recent Labs     20  0605   LIPASE 91.0*   AMYLASE 163*          ASSESSMENT :     Anorexia - main complaint is lack of appetite. some nausea with attempting to eat. Prior EGD and colonoscopy for similar sx in 2017 was negative. He is tolerating clear liquids with improvement in serum Na. Will plan EGD Monday to further evaluate. See below.     Elevated transaminases/Abnormal liver on CT abd/pelvis - - mild elevation of AST>ALT. Also noted to have periportal edema on CT and hypodensity in left hepatic lobe, could have been focal fat, but MRI Liver shows irregular left hepatic duct system. No mass. There is mild GB wall thickening and pericholecystic fluid. Amylase and lipase are elevated. I am more inclined to believe his picture actually reflects an atypical presentation of gallbladder disease/dysfunction.

## 2020-03-01 NOTE — PLAN OF CARE
Problem: Pain:  Goal: Pain level will decrease  Description  Pain level will decrease  Outcome: Ongoing  Goal: Control of acute pain  Description  Control of acute pain  Outcome: Ongoing  Goal: Control of chronic pain  Description  Control of chronic pain  Outcome: Ongoing     Problem: Nutrition  Goal: Optimal nutrition therapy  Outcome: Ongoing     Problem: Falls - Risk of:  Goal: Will remain free from falls  Description  Will remain free from falls  Outcome: Ongoing  Goal: Absence of physical injury  Description  Absence of physical injury  Outcome: Ongoing     Problem: Nausea/Vomiting:  Goal: Absence of nausea/vomiting  Description  Absence of nausea/vomiting  Outcome: Ongoing  Goal: Able to drink  Description  Able to drink  3/1/2020 1137 by Janee Cardozo RN  Outcome: Ongoing  3/1/2020 0222 by Poornima Rosas RN  Outcome: Ongoing  Goal: Able to eat  Description  Able to eat  3/1/2020 1137 by Janee Cardozo RN  Outcome: Ongoing  3/1/2020 0222 by Poornima Rosas RN  Outcome: Ongoing  Goal: Ability to achieve adequate nutritional intake will improve  Description  Ability to achieve adequate nutritional intake will improve  Outcome: Ongoing

## 2020-03-01 NOTE — PLAN OF CARE
Problem: Nausea/Vomiting:  Goal: Able to drink  Description  Able to drink  Outcome: Ongoing     Problem: Nausea/Vomiting:  Goal: Able to eat  Description  Able to eat  Outcome: Ongoing     Problem: Pain:  Goal: Control of chronic pain  Description  Control of chronic pain  2/29/2020 1451 by Jeffry Mora RN  Outcome: Ongoing

## 2020-03-01 NOTE — PROGRESS NOTES
Patient resting in bed at this time and denies any need. VSS. Will continue to monitor. Call light in reach.

## 2020-03-01 NOTE — PROGRESS NOTES
[facility] GI  Gastroenterology Progress Note  Linda Hernandez is a 37 y.o. male patient. 1. Hyponatremia        SUBJECTIVE:  Still no appetite. Vague abd discomfort is resolving. Physical    VITALS:  /75   Pulse 112   Temp 99 °F (37.2 °C) (Oral)   Resp 16   Ht 5' 11\" (1.803 m)   Wt 159 lb 9.6 oz (72.4 kg)   SpO2 99%   BMI 22.26 kg/m²   TEMPERATURE:  Current - Temp: 99 °F (37.2 °C); Max - Temp  Av.9 °F (37.2 °C)  Min: 98.5 °F (36.9 °C)  Max: 99.2 °F (37.3 °C)    Abdomen soft, ND, NT, no HSM, Bowel sounds normal     Data      Recent Labs     20  1242 20  0535   WBC 9.0 8.4   HGB 14.6 12.9*   HCT 43.1 38.4*   MCV 85.2 85.5    295     Recent Labs     20  0535 20  1032 20  0639   * 130* 132*   K 4.5 4.3 4.6   CL 90* 91* 96*   CO2 22 23 22   BUN 15 13 11   CREATININE 0.8* 0.7* 0.7*     Recent Labs     20  1242 20  0535 20  0639   AST 88* 83* 40*   ALT 63* 58* 43*   BILIDIR  --   --  0.3   BILITOT 0.7 0.6 0.7   ALKPHOS 93 85 82     No results for input(s): LIPASE, AMYLASE in the last 72 hours. Serum cortisol normal.    MRI Liver with contrast 2020:   1. Mild periportal edema left hepatic lobe possibly related to hepatitis, but   this can be seen with segmental atrophy of the liver (correlate with remote   history of trauma in this region). 2. Nonspecific mild central left hepatic bile duct dilatation.  Suboptimally   evaluated without MRCP series.  ERCP or MRCP correlation may be useful for   additional characterization.  I suspect this to reflect segmental atrophy of   the liver, possibly remote posttraumatic.  Focal cholangitis is a   consideration.  The patient's age as well as absence of underlying   enhancement abnormality makes the consideration of malignant etiology (such   as cholangiocarcinoma) very unlikely. 3. Nonspecific gerri hepatis and portacaval lymph node enlargement.    Involvement with malignancy is a consideration though these may simply be   reactive. 4. No cholelithiasis is evident, however findings are seen which can reflect   cholecystitis.             ASSESSMENT :     Anorexia - main complaint is lack of appetite. some nausea with attempting to eat. Prior EGD and colonoscopy for similar sx in 2017 was negative. He is tolerating clear liquids with improvement in serum Na. Will plan EGD Monday to further evaluate. Abnormal liver on CT abd/pelvis - hypodensity in left hepatic lobe, could be focal fat but needs MRI Liver shows irregular left hepatic duct system. No mass. Will check Ca19-9 and consider MRCP vs ERCP if he fails to improve and EGD unrevealing. .     Elevated transaminases - mild elevation of AST>ALT. Also noted to have periportal edema on CT. Prior GB US 8/2019 was unremarkable. Prior w/u for celiac negative (bx, ttg, IgA). Would consider liver bx to r/o infiltrative disease if above is unrevealing. Hyponatremia - nephrology following. Urine lytes c/w SIADH.         PLAN   :  1) Clear liquid diet today and advance in AM as tolerated. 2) EGD Monday to r/o UGI pathology. 3) Check serum Ca19-9.  4) Consider EUS of pancreas/biliary tree vs ERCP depending on above.     Angela Deutsch MD  [address]  2/29/2020

## 2020-03-01 NOTE — PROGRESS NOTES
Office : [phone]     Fax :[phone]       Nephrology Note     Na better  Better solute intake   No N/V/D     Past Medical History:   Diagnosis Date    Anxiety     GERD (gastroesophageal reflux disease)     IBS (irritable bowel syndrome)        Past Surgical History:   Procedure Laterality Date    COLONOSCOPY      EXCISION / BIOPSY SKIN LESION OF TRUNK N/A 12/18/2018    EXCISION OF SEBACEOUS CYST ON BACK performed by Andrés Lomax MD at [place] [address]. History   Problem Relation Age of Onset    Hypertension Father     Cancer Father     Cancer Other         reports that he has been smoking cigarettes. He has a 11.50 pack-year smoking history. He has quit using smokeless tobacco.  His smokeless tobacco use included chew. He reports current alcohol use. He reports that he does not use drugs.     Allergies:  Amoxicillin-pot clavulanate; Citalopram; Midrin [apap-isometheptene-dichloral]; and Penicillins    Current Medications:    [START ON 3/2/2020] pantoprazole (PROTONIX) tablet 40 mg, QAM AC  tiZANidine (ZANAFLEX) tablet 4 mg, TID PRN  promethazine (PHENERGAN) injection 6.25 mg, Q6H PRN  sodium chloride flush 0.9 % injection 10 mL, 2 times per day  sodium chloride flush 0.9 % injection 10 mL, PRN  acetaminophen (TYLENOL) tablet 650 mg, Q6H PRN  acetaminophen (TYLENOL) suppository 650 mg, Q6H PRN  polyethylene glycol (GLYCOLAX) packet 17 g, Daily PRN  promethazine (PHENERGAN) tablet 12.5 mg, Q6H PRN  ondansetron (ZOFRAN) injection 4 mg, Q6H PRN  enoxaparin (LOVENOX) injection 40 mg, Nightly        Review of Systems:   14 point ROS obtained but were negative except mentioned in HPI      Physical exam:     Vitals:  /72   Pulse 104   Temp 98.5 °F (36.9 °C) (Oral) can be secondary to   localized disease (such as hepatitis), but can also be secondary to systemic   etiologies (such as third-spacing). XR CHEST PORTABLE   Final Result   Unremarkable chest.           No intake/output data recorded. Assessment/Plan :      1. Hyponatremia.  likely low solute intake   Better with IVF   Sodium level 123----> 128 -----> 130--->132--->136   Urine lytes reviewed - SIADH     2. H/o GERD, duodenitis, elevated LFT   EUS   EGD Monday  PPI   MRI Liver shows irregular left hepatic duct system.      3. Elevated LFT     Check BMP in am             Thank you for allowing us to participate in care of Jamie Hernandez         Electronically signed by: Drake Cho MD, 3/1/2020, 2:39 PM      [facility]  Office : [phone]  Fax :[phone]

## 2020-03-01 NOTE — PROGRESS NOTES
Pt assessment completed. Pt VS taken and temp/HR elevated; will continue to monitor. Pt denies pain or nausea. Pt refused scheduled lovenox. No further needs at this time. Call light within reach.   .Electronically signed by Te Dai RN on 3/1/2020 at 1:13 AM

## 2020-03-02 LAB
ALBUMIN SERPL-MCNC: 2.6 G/DL (ref 3.4–5)
ALP BLD-CCNC: 98 U/L (ref 40–129)
ALT SERPL-CCNC: 50 U/L (ref 10–40)
AMYLASE: 178 U/L (ref 25–115)
ANION GAP SERPL CALCULATED.3IONS-SCNC: 14 MMOL/L (ref 3–16)
AST SERPL-CCNC: 46 U/L (ref 15–37)
BILIRUB SERPL-MCNC: 0.7 MG/DL (ref 0–1)
BILIRUBIN DIRECT: <0.2 MG/DL (ref 0–0.3)
BILIRUBIN, INDIRECT: ABNORMAL MG/DL (ref 0–1)
BUN BLDV-MCNC: 11 MG/DL (ref 7–20)
CALCIUM SERPL-MCNC: 8.8 MG/DL (ref 8.3–10.6)
CHLORIDE BLD-SCNC: 95 MMOL/L (ref 99–110)
CO2: 26 MMOL/L (ref 21–32)
CREAT SERPL-MCNC: 0.7 MG/DL (ref 0.9–1.3)
CRYPTOSPORIDIUM ANTIGEN STOOL: NORMAL
E HISTOLYTICA ANTIGEN STOOL: NORMAL
GFR AFRICAN AMERICAN: >60
GFR NON-AFRICAN AMERICAN: >60
GI BACTERIAL PATHOGENS BY PCR: NORMAL
GIARDIA ANTIGEN STOOL: NORMAL
GLUCOSE BLD-MCNC: 95 MG/DL (ref 70–99)
LIPASE: 92 U/L (ref 13–60)
POTASSIUM SERPL-SCNC: 4.7 MMOL/L (ref 3.5–5.1)
SODIUM BLD-SCNC: 135 MMOL/L (ref 136–145)
TOTAL PROTEIN: 7.6 G/DL (ref 6.4–8.2)

## 2020-03-02 PROCEDURE — 80076 HEPATIC FUNCTION PANEL: CPT

## 2020-03-02 PROCEDURE — 82150 ASSAY OF AMYLASE: CPT

## 2020-03-02 PROCEDURE — 1200000000 HC SEMI PRIVATE

## 2020-03-02 PROCEDURE — 83690 ASSAY OF LIPASE: CPT

## 2020-03-02 PROCEDURE — 2580000003 HC RX 258: Performed by: INTERNAL MEDICINE

## 2020-03-02 PROCEDURE — 6370000000 HC RX 637 (ALT 250 FOR IP): Performed by: INTERNAL MEDICINE

## 2020-03-02 PROCEDURE — 36415 COLL VENOUS BLD VENIPUNCTURE: CPT

## 2020-03-02 PROCEDURE — 80048 BASIC METABOLIC PNL TOTAL CA: CPT

## 2020-03-02 RX ADMIN — Medication 10 ML: at 21:50

## 2020-03-02 RX ADMIN — PANTOPRAZOLE SODIUM 40 MG: 40 TABLET, DELAYED RELEASE ORAL at 09:29

## 2020-03-02 RX ADMIN — Medication 10 ML: at 09:29

## 2020-03-02 ASSESSMENT — PAIN SCALES - GENERAL
PAINLEVEL_OUTOF10: 0

## 2020-03-02 NOTE — PROGRESS NOTES
Office : [phone]     Fax :[phone]       Nephrology Note     Na better  Better solute intake   No N/V/D     Past Medical History:   Diagnosis Date    Anxiety     GERD (gastroesophageal reflux disease)     IBS (irritable bowel syndrome)        Past Surgical History:   Procedure Laterality Date    COLONOSCOPY      EXCISION / BIOPSY SKIN LESION OF TRUNK N/A 12/18/2018    EXCISION OF SEBACEOUS CYST ON BACK performed by Howard Lopez MD at [place] [address]. History   Problem Relation Age of Onset    Hypertension Father     Cancer Father     Cancer Other         reports that he has been smoking cigarettes. He has a 11.50 pack-year smoking history. He has quit using smokeless tobacco.  His smokeless tobacco use included chew. He reports current alcohol use. He reports that he does not use drugs.     Allergies:  Amoxicillin-pot clavulanate; Citalopram; Midrin [apap-isometheptene-dichloral]; and Penicillins    Current Medications:    pantoprazole (PROTONIX) tablet 40 mg, QAM AC  tiZANidine (ZANAFLEX) tablet 4 mg, TID PRN  promethazine (PHENERGAN) injection 6.25 mg, Q6H PRN  sodium chloride flush 0.9 % injection 10 mL, 2 times per day  sodium chloride flush 0.9 % injection 10 mL, PRN  acetaminophen (TYLENOL) tablet 650 mg, Q6H PRN  acetaminophen (TYLENOL) suppository 650 mg, Q6H PRN  polyethylene glycol (GLYCOLAX) packet 17 g, Daily PRN  promethazine (PHENERGAN) tablet 12.5 mg, Q6H PRN  ondansetron (ZOFRAN) injection 4 mg, Q6H PRN  enoxaparin (LOVENOX) injection 40 mg, Nightly              Physical exam:     Vitals:  /68   Pulse 109   Temp 99.1 °F (37.3 °C)   Resp 16   Ht 5' 11\" (1.803 m)   Wt 159 lb 9.6 oz (72.4 kg)   SpO2 96%   BMI 22.26 kg/m²   Constitutional: OAA X3 NAD  Skin: no rash, turgor wnl  Heent:  eomi, mmm  Neck: no bruits or jvd noted  Cardiovascular:  S1, S2 without m/r/g  Respiratory: CTA B without w/r/r  Abdomen:  +bs, soft, nt, nd  Ext: no  lower extremity edema  Psychiatric: mood and affect appropriate  Musculoskeletal:  Rom, muscular strength intact    Labs:  CBC:   Recent Labs     03/01/20  0606   WBC 8.1   HGB 12.9*        BMP:    Recent Labs     02/29/20  0639 03/01/20  0605 03/02/20  0611   * 136 135*   K 4.6 4.9 4.7   CL 96* 96* 95*   CO2 22 24 26   BUN 11 13 11   CREATININE 0.7* 0.7* 0.7*   GLUCOSE 100* 100* 95     Ca/Mg/Phos:   Recent Labs     02/29/20  0639 03/01/20  0605 03/02/20  0611   CALCIUM 8.2* 8.8 8.8     Hepatic:   Recent Labs     02/29/20  0639 03/01/20  0605 03/02/20  0611   AST 40* 57* 46*   ALT 43* 49* 50*   BILITOT 0.7 0.7 0.7   ALKPHOS 82 97 98     Troponin:   No results for input(s): TROPONINI in the last 72 hours. BNP: No results for input(s): BNP in the last 72 hours. Lipids: No results for input(s): CHOL, TRIG, HDL, LDLCALC, LABVLDL in the last 72 hours. ABGs: No results for input(s): PHART, PO2ART, NSG2HBR in the last 72 hours. INR: No results for input(s): INR in the last 72 hours. UA:No results for input(s): [employer], GLUCOSEU, BILIRUBINUR, Jani Getting, BLOODU, PHUR, PROTEINU, UROBILINOGEN, NITRU, LEUKOCYTESUR, LABMICR, URINETYPE in the last 72 hours. Urine Microscopic: No results for input(s): LABCAST, BACTERIA, COMU, HYALCAST, WBCUA, RBCUA, EPIU in the last 72 hours. Urine Culture: No results for input(s): LABURIN in the last 72 hours. Urine Chemistry:   No results for input(s): Calista Sheikh, PROTEINUR, NAUR in the last 72 hours. IMAGING:  MRI ABDOMEN W WO CONTRAST   Final Result   1. Mild periportal edema left hepatic lobe possibly related to hepatitis, but   this can be seen with segmental atrophy of the liver (correlate with remote   history of trauma in this region).    2. third-spacing). XR CHEST PORTABLE   Final Result   Unremarkable chest.           No intake/output data recorded. Assessment/Plan :      1. Hyponatremia. Sodium level 123----> 128 -----> 130--->132--->136 ---> 135   Urine lytes reviewed - SIADH     2. H/o GERD, duodenitis, elevated LFT   EUS per GI  PPI   MRI Liver shows irregular left hepatic duct system.      3. Elevated LFT     Check BMP in am             Thank you for allowing us to participate in care of Crystal Campoverde         Electronically signed by: Krista Pickard MD, 3/2/2020, 7:18 AM      [facility] [address]  Office : [phone]  Fax :[phone]

## 2020-03-02 NOTE — PROGRESS NOTES
40* 57*   ALT 58* 43* 49*   BILITOT 0.6 0.7 0.7   ALKPHOS 85 82 97       Troponin:   No results for input(s): TROPONINI in the last 72 hours. BNP: No results for input(s): BNP in the last 72 hours. INR: No results for input(s): INR in the last 72 hours. Reviewed imaging and reports noted      Assessment:  Active Problems:    Hyponatremia    Moderate malnutrition ([address]  Resolved Problems:    * No resolved hospital problems.  *        Plan:  Abnormal liver on CT abdomen and pelvis  -Appreciate GI recommendations  -Continue daily PPI      Transaminitis  -Improving, continue to monitor      Hyponatremia  -Improving, appreciate nephrology recommendations      Anorexia  -Work-up pending, advance diet as tolerated, continue to monitor        Diet: DIET CLEAR LIQUID;    Activity: Up as tolerated  Prophylaxis: Lovenox    Code status: Full Code     ----------        Avni Li MD  -------------------------------  Zaki hospitalist

## 2020-03-02 NOTE — PROGRESS NOTES
[facility]  Gastroenterology Progress Note  Kaur Vick is a 37 y.o. male patient. 1. Hyponatremia        SUBJECTIVE:  Na improved. Still has anorexia and some nausea and bloating with liquid intake. Denies abd pain. Physical    VITALS:  BP 98/68   Pulse 102   Temp 98.4 °F (36.9 °C) (Oral)   Resp 18   Ht 5' 11\" (1.803 m)   Wt 159 lb 9.6 oz (72.4 kg)   SpO2 97%   BMI 22.26 kg/m²   TEMPERATURE:  Current - Temp: 98.4 °F (36.9 °C); Max - Temp  Av.8 °F (37.1 °C)  Min: 98.3 °F (36.8 °C)  Max: 99.2 °F (37.3 °C)    Abdomen soft, ND, some RUQ tender to deep palpation, no HSM, Bowel sounds normal     Data      Recent Labs     20  0606   WBC 8.1   HGB 12.9*   HCT 39.2*   MCV 87.4        Recent Labs     20  0639 20  0605 20  0611   * 136 135*   K 4.6 4.9 4.7   CL 96* 96* 95*   CO2 22 24 26   BUN 11 13 11   CREATININE 0.7* 0.7* 0.7*     Recent Labs     20  0639 20  0605 20  0611   AST 40* 57* 46*   ALT 43* 49* 50*   BILIDIR 0.3 0.3 <0.2   BILITOT 0.7 0.7 0.7   ALKPHOS 82 97 98     Recent Labs     20  0605   LIPASE 91.0*   AMYLASE 163*          ASSESSMENT :     Anorexia - main complaint is lack of appetite. some nausea with attempting to eat. Prior EGD and colonoscopy for similar sx in 2017 was negative. He is tolerating clear liquids with improvement in serum Na. Will plan EGD Wed (along with EUS) to further evaluate. See below.     Elevated transaminases/Abnormal liver on CT abd/pelvis - - mild elevation of AST>ALT. Also noted to have periportal edema on CT and hypodensity in left hepatic lobe, could have been focal fat, but MRI Liver shows irregular left hepatic duct system. No mass. There is mild GB wall thickening and pericholecystic fluid. Amylase and lipase are elevated. I am more inclined to believe his picture actually reflects an atypical presentation of gallbladder disease/dysfunction.   Will check Ca19-9 and plan EUS of pancreas and

## 2020-03-02 NOTE — PROGRESS NOTES
Nutrition Assessment    Type and Reason for Visit: Reassess    Nutrition Recommendations:   1. Diet advancement per MD  2. Trial Ensure Clear TID    Nutrition Assessment: Pt's nutrition status continues to decline. Pt on clear liquid diet, consuming 1-25% of meals. Pt c/o early satiety and \"unsettled stomach\" with PO intake. RD discussed addition of Ensure Clear; pt very hesitant to try supplement, stating \"I don't think I will be trying that anytime soon. \" RD discussed the importance of nutrition and lack of calories and protein in clear liquid diet. Pt eventually agreed to try Ensure Clear either tonight or tomorrow. Will monitor for improved PO intake and tolerance to supplement. Malnutrition Assessment:  · Malnutrition Status: Meets the criteria for moderate malnutrition  · Context: Acute illness or injury  · Findings of the 6 clinical characteristics of malnutrition (Minimum of 2 out of 6 clinical characteristics is required to make the diagnosis of moderate or severe Protein Calorie Malnutrition based on AND/ASPEN Guidelines):  1. Energy Intake-Less than or equal to 50% of estimated energy requirement, Greater than or equal to 7 days    2. Weight Loss-7.5% loss or greater, in 1 week  3. Fat Loss-Mild subcutaneous fat loss, Triceps  4. Muscle Loss-Mild muscle mass loss, Clavicles (pectoralis and deltoids), Calf (gastrocnemius)  5. Fluid Accumulation-No significant fluid accumulation,    6.  Strength-Not measured    Nutrition Risk Level: High    Nutrient Needs:  · Estimated Daily Total Kcal: 1800 - 2160  · Estimated Daily Protein (g): 86 - 108  · Estimated Daily Total Fluid (ml/day): 1 mL/kcal    Nutrition Diagnosis:   · Problem: Inadequate oral intake  · Etiology: related to Insufficient energy/nutrient consumption     Signs and symptoms:  as evidenced by Intake 0-25%    Objective Information:  · Nutrition-Focused Physical Findings: No edema noted. Na+ 135.    · Wound Type: None  · Current Nutrition Therapies:  · Oral Diet Orders: Clear Liquid   · Oral Diet intake: 1-25%  · Oral Nutrition Supplement (ONS) Orders: None  · Anthropometric Measures:  · Ht: 5' 11\" (180.3 cm)   · Current Body Wt: 159 lb (72.1 kg)  · % Weight Change:  ,  2/20/20 175lb; indicating a loss of 16lb/9% in 1 wk  · Ideal Body Wt: 172 lb (78 kg)   · BMI Classification: BMI 18.5 - 24.9 Normal Weight    Nutrition Interventions:   Continue current diet, Start ONS  Continued Inpatient Monitoring, Education Not Indicated    Nutrition Evaluation:   · Evaluation: Goals set   · Goals: Pt will consume at least 50% of meals and supplements     · Monitoring: Meal Intake, Supplement Intake, Diet Tolerance, Nutrition Progression, Pertinent Labs      Electronically signed by Kaylie Guerrero RD, LD on 3/2/20 at 2:14 PM    Contact Number: [phone]

## 2020-03-03 ENCOUNTER — ANESTHESIA EVENT (OUTPATIENT)
Dept: ENDOSCOPY | Age: 35
DRG: 418 | End: 2020-03-03
Payer: COMMERCIAL

## 2020-03-03 LAB
ALBUMIN SERPL-MCNC: 2.7 G/DL (ref 3.4–5)
ALP BLD-CCNC: 98 U/L (ref 40–129)
ALT SERPL-CCNC: 48 U/L (ref 10–40)
ANION GAP SERPL CALCULATED.3IONS-SCNC: 14 MMOL/L (ref 3–16)
AST SERPL-CCNC: 38 U/L (ref 15–37)
BILIRUB SERPL-MCNC: 0.7 MG/DL (ref 0–1)
BILIRUBIN DIRECT: 0.3 MG/DL (ref 0–0.3)
BILIRUBIN, INDIRECT: 0.4 MG/DL (ref 0–1)
BUN BLDV-MCNC: 11 MG/DL (ref 7–20)
CALCIUM SERPL-MCNC: 8.6 MG/DL (ref 8.3–10.6)
CERULOPLASMIN: 43 MG/DL (ref 15–30)
CHLORIDE BLD-SCNC: 95 MMOL/L (ref 99–110)
CO2: 27 MMOL/L (ref 21–32)
CREAT SERPL-MCNC: 0.7 MG/DL (ref 0.9–1.3)
GFR AFRICAN AMERICAN: >60
GFR NON-AFRICAN AMERICAN: >60
GLUCOSE BLD-MCNC: 95 MG/DL (ref 70–99)
POTASSIUM SERPL-SCNC: 4.8 MMOL/L (ref 3.5–5.1)
SODIUM BLD-SCNC: 136 MMOL/L (ref 136–145)
TOTAL PROTEIN: 7.4 G/DL (ref 6.4–8.2)

## 2020-03-03 PROCEDURE — 80076 HEPATIC FUNCTION PANEL: CPT

## 2020-03-03 PROCEDURE — 2580000003 HC RX 258: Performed by: INTERNAL MEDICINE

## 2020-03-03 PROCEDURE — 6370000000 HC RX 637 (ALT 250 FOR IP): Performed by: INTERNAL MEDICINE

## 2020-03-03 PROCEDURE — 1200000000 HC SEMI PRIVATE

## 2020-03-03 PROCEDURE — 36415 COLL VENOUS BLD VENIPUNCTURE: CPT

## 2020-03-03 PROCEDURE — 80048 BASIC METABOLIC PNL TOTAL CA: CPT

## 2020-03-03 PROCEDURE — 94760 N-INVAS EAR/PLS OXIMETRY 1: CPT

## 2020-03-03 RX ADMIN — PANTOPRAZOLE SODIUM 40 MG: 40 TABLET, DELAYED RELEASE ORAL at 06:41

## 2020-03-03 RX ADMIN — Medication 10 ML: at 10:03

## 2020-03-03 RX ADMIN — Medication 10 ML: at 22:34

## 2020-03-03 ASSESSMENT — PAIN SCALES - GENERAL
PAINLEVEL_OUTOF10: 0

## 2020-03-03 ASSESSMENT — PAIN DESCRIPTION - PROGRESSION
CLINICAL_PROGRESSION: GRADUALLY IMPROVING

## 2020-03-03 ASSESSMENT — LIFESTYLE VARIABLES: SMOKING_STATUS: 1

## 2020-03-03 NOTE — PROGRESS NOTES
Clinical Pharmacy Note     Lovenox placed on hold per GI in prep for EUS tomorrow. Order discontinued per protocol. Please assess and reorder when appropriate. Thank you for allowing us to participate in the care of this patient.      Magali Laws, PharmD, BCPS   x [phone]

## 2020-03-03 NOTE — PROGRESS NOTES
Hospitalist Progress Note      PCP: Betty Rajput MD    Date of Admission: 2/27/2020    Chief Complaint: Hyponatremia, wt loss      Subjective:   Denies abd pain. Intermittent nausea, but denies emesis. Poor po intake    Medications:  Reviewed    Infusion Medications   Scheduled Medications    pantoprazole  40 mg Oral QAM AC    sodium chloride flush  10 mL Intravenous 2 times per day     PRN Meds: tiZANidine, promethazine, sodium chloride flush, acetaminophen, acetaminophen, polyethylene glycol, promethazine, ondansetron      Intake/Output Summary (Last 24 hours) at 3/3/2020 1608  Last data filed at 3/2/2020 1837  Gross per 24 hour   Intake 360 ml   Output 1 ml   Net 359 ml       Exam:    BP 99/70   Pulse 98   Temp 97.7 °F (36.5 °C) (Oral)   Resp 19   Ht 5' 11\" (1.803 m)   Wt 159 lb 9.6 oz (72.4 kg)   SpO2 96%   BMI 22.26 kg/m²     Gen/overall appearance: Not in acute distress. Alert. Head: Normocephalic, atraumatic  Eyes: EOMI, no scleral icterus  CVS: regular rate and rhythm, Normal S1S2  Pulm: Clear to auscultation bilaterally. No crackles/wheezes  Gastrointestinal: Soft, nontender, nondistended, no guarding or rebound  Extremities: No edema. No erythema or warmth  Neuro: No gross focal deficits noted  Skin: Warm, dry    Labs:   Recent Labs     03/01/20  0606   WBC 8.1   HGB 12.9*   HCT 39.2*        Recent Labs     03/01/20  0605 03/02/20  0611 03/03/20  0658    135* 136   K 4.9 4.7 4.8   CL 96* 95* 95*   CO2 24 26 27   BUN 13 11 11   CREATININE 0.7* 0.7* 0.7*   CALCIUM 8.8 8.8 8.6     Recent Labs     03/01/20  0605 03/02/20  0611 03/03/20  0659   AST 57* 46* 38*   ALT 49* 50* 48*   BILIDIR 0.3 <0.2 0.3   BILITOT 0.7 0.7 0.7   ALKPHOS 97 98 98     No results for input(s): INR in the last 72 hours. No results for input(s): Ti Page in the last 72 hours.     Assessment/Plan:    Active Hospital Problems    Diagnosis Date Noted    Moderate malnutrition ([facility] [E44.0] 02/28/2020    Hyponatremia [E87.1] 02/27/2020       Anorexia with nausea and vomiting  Moderate malnutrition  - planned for EGD on weds  - advance diet per GI recs  - gentle IVF hydration  - antiemetics PRN  - protonix  - GI following     Transaminitis  Mild elevation in lipase / amylase  Mild periorbital liver edema and nonspecific L hepatic bile duct dilatation on MRCP. Questionable hepatitis, atrophy  - planned for ERCP on weds  - trend LFTs      Hyponatremia; likely hypovolemic.   Improving  - nephro following  - trend Na closely  - gentle IVFs     Diet: DIET CLEAR LIQUID;  Dietary Nutrition Supplements: Clear Liquid Oral Supplement  Diet NPO, After Midnight  Code Status: Full Code    Dispo - Serg Penaloza MD

## 2020-03-03 NOTE — ANESTHESIA PRE PROCEDURE
(LOVENOX) injection 40 mg  40 mg Subcutaneous Nightly Roberta Klein MD           Allergies: Allergies   Allergen Reactions    Amoxicillin-Pot Clavulanate      Doesn't remember rx    Citalopram      Jittery, sweating, felt \"weird\"    Midrin [Apap-Isometheptene-Dichloral]      Doesn't remember rx    Penicillins      Doesn't remember rx       Problem List:    Patient Active Problem List   Diagnosis Code    Depressive disorder, not elsewhere classified F32.9    Esophageal reflux K21.9    Anxiety F41.9    Panic disorder F41.0    IBS (irritable bowel syndrome) K58.9    Sebaceous cyst L72.3    Hyponatremia E87.1    Moderate malnutrition (HCC) E44.0       Past Medical History:        Diagnosis Date    Anxiety     GERD (gastroesophageal reflux disease)     IBS (irritable bowel syndrome)        Past Surgical History:        Procedure Laterality Date    COLONOSCOPY      EXCISION / BIOPSY SKIN LESION OF TRUNK N/A 12/18/2018    EXCISION OF SEBACEOUS CYST ON BACK performed by Migel Plummer MD at [address] [place] [address] History:    Social History     Tobacco Use    Smoking status: Current Every Day Smoker     Packs/day: 0.50     Years: 23.00     Pack years: 11.50     Types: Cigarettes    Smokeless tobacco: Former User     Types: Chew   Substance Use Topics    Alcohol use: Yes                                Ready to quit: Not Answered  Counseling given: Not Answered      Vital Signs (Current): There were no vitals filed for this visit.                                            BP Readings from Last 3 Encounters:   03/03/20 99/70   02/20/20 111/69   02/15/20 (!) 116/90       NPO Status:                                                                                 BMI:   Wt Readings from Last 3 Encounters:   02/27/20 159 lb 9.6 oz (72.4 kg)   02/20/20 175 lb (79.4 kg)   02/15/20 177 lb (80.3 kg)     There is no height or weight on file to calculate BMI.    CBC:   Lab Results   Component Value Date WBC 8.1 03/01/2020    RBC 4.49 03/01/2020    HGB 12.9 03/01/2020    HCT 39.2 03/01/2020    MCV 87.4 03/01/2020    RDW 13.7 03/01/2020     03/01/2020       CMP:   Lab Results   Component Value Date     03/03/2020    K 4.8 03/03/2020    K 4.5 02/28/2020    CL 95 03/03/2020    CO2 27 03/03/2020    BUN 11 03/03/2020    CREATININE 0.7 03/03/2020    GFRAA >60 03/03/2020    AGRATIO 0.5 02/28/2020    LABGLOM >60 03/03/2020    GLUCOSE 95 03/03/2020    PROT 7.4 03/03/2020    CALCIUM 8.6 03/03/2020    BILITOT 0.7 03/03/2020    ALKPHOS 98 03/03/2020    AST 38 03/03/2020    ALT 48 03/03/2020       POC Tests: No results for input(s): POCGLU, POCNA, POCK, POCCL, POCBUN, POCHEMO, POCHCT in the last 72 hours. Coags: No results found for: PROTIME, INR, APTT    HCG (If Applicable): No results found for: PREGTESTUR, PREGSERUM, HCG, HCGQUANT     ABGs: No results found for: PHART, PO2ART, DRG8IKO, PCN8EJE, BEART, J7BSAQUX     Type & Screen (If Applicable):  No results found for: [facility]    Anesthesia Evaluation  Patient summary reviewed and Nursing notes reviewed no history of anesthetic complications:   Airway: Mallampati: II  TM distance: >3 FB   Neck ROM: full  Mouth opening: > = 3 FB Dental: normal exam         Pulmonary:Negative Pulmonary ROS breath sounds clear to auscultation  (+) current smoker (half a pack a day)    (-) asthma and recent URI                           Cardiovascular:  Exercise tolerance: good (>4 METS),       (-) hypertension, CAD and  angina      Rhythm: regular  Rate: normal                 ROS comment: Patient states no significant heart history.      Neuro/Psych:   (+) depression/anxiety    (-) seizures, TIA and CVA           GI/Hepatic/Renal:   (+) GERD: well controlled,      (-) no renal disease       Endo/Other:        (-) diabetes mellitus, hypothyroidism, hyperthyroidism               Abdominal:           Vascular:                                      Anesthesia Plan      MAC

## 2020-03-03 NOTE — PROGRESS NOTES
[facility]  Gastroenterology Progress Note  Candida Griffith is a 37 y.o. male patient. 1. Hyponatremia        SUBJECTIVE:  Na improved. Still with bloating with liquid intake. Denies abd pain. Physical    VITALS:  BP 99/70   Pulse 98   Temp 97.7 °F (36.5 °C) (Oral)   Resp 19   Ht 5' 11\" (1.803 m)   Wt 159 lb 9.6 oz (72.4 kg)   SpO2 96%   BMI 22.26 kg/m²   TEMPERATURE:  Current - Temp: 97.7 °F (36.5 °C); Max - Temp  Av.2 °F (36.8 °C)  Min: 97.7 °F (36.5 °C)  Max: 98.7 °F (37.1 °C)    Abdomen soft, ND, nontender, no HSM, Bowel sounds normal     Data      Recent Labs     20  0606   WBC 8.1   HGB 12.9*   HCT 39.2*   MCV 87.4        Recent Labs     20  0605 20  0611 20  0658    135* 136   K 4.9 4.7 4.8   CL 96* 95* 95*   CO2 24 26 27   BUN 13 11 11   CREATININE 0.7* 0.7* 0.7*     Recent Labs     20  0605 20  0611 20  0659   AST 57* 46* 38*   ALT 49* 50* 48*   BILIDIR 0.3 <0.2 0.3   BILITOT 0.7 0.7 0.7   ALKPHOS 97 98 98     Recent Labs     20  0605 20  0611   LIPASE 91.0* 92.0*   AMYLASE 163* 178*          ASSESSMENT :     Anorexia - main complaint is lack of appetite. some nausea with attempting to eat. Prior EGD and colonoscopy for similar sx in 2017 was negative. He is tolerating clear liquids with improvement in serum Na. Will plan EGD Wed (along with EUS) to further evaluate. See below.     Elevated transaminases/Abnormal liver on CT abd/pelvis - - mild elevation of AST>ALT. Also noted to have periportal edema on CT and hypodensity in left hepatic lobe, could have been focal fat, but MRI Liver shows irregular left hepatic duct system. No mass. There is mild GB wall thickening and pericholecystic fluid. Amylase and lipase are elevated. I am more inclined to believe his picture actually reflects an atypical presentation of gallbladder disease/dysfunction. Will check Ca19-9 and plan EUS of pancreas and GB Wednesday.     Prior GB US 8/2019 was unremarkable. Prior w/u for celiac negative (bx, ttg, IgA).    Hyponatremia - nephrology following.  Urine lytes c/w SIADH. Na now normal.           PLAN   :  1) Clear liquids as tolerated   2) NPO after midnight. Plan EUS Wednesday  3) f/u serum Ca19-9.  4) monitor liver enzymes   5) He is on pantoprazole. Discussed with Dr. Dora Kahn, PA-C  [facility]  I have personally performed a face to face diagnostic evaluation on this patient. I have interviewed and examined the patient and I agree with the findings and recommended plan of care. In summary, my findings and plan are the following: As above, looks the same. Feels clear liquids going down easier with less bloating. Await EUS 3/4 to eval pancreas, biliary tree and GB. Will consider surgical eval based on EUS results.     Sweta Smith MD  [address]  3/3/2020

## 2020-03-03 NOTE — PROGRESS NOTES
(72.4 kg)   SpO2 95%   BMI 22.26 kg/m²   Constitutional:  OAA X3 NAD  Skin: no rash, turgor wnl  Heent:  eomi, mmm  Neck: no bruits or jvd noted  Cardiovascular:  S1, S2 without m/r/g  Respiratory: CTA B without w/r/r  Abdomen:  +bs, soft, nt, nd  Ext: no  lower extremity edema  Psychiatric: mood and affect appropriate  Musculoskeletal:  Rom, muscular strength intact    Labs:  CBC:   Recent Labs     03/01/20  0606   WBC 8.1   HGB 12.9*        BMP:    Recent Labs     03/01/20  0605 03/02/20  0611 03/03/20  0658    135* 136   K 4.9 4.7 4.8   CL 96* 95* 95*   CO2 24 26 27   BUN 13 11 11   CREATININE 0.7* 0.7* 0.7*   GLUCOSE 100* 95 95     Ca/Mg/Phos:   Recent Labs     03/01/20  0605 03/02/20  0611 03/03/20  0658   CALCIUM 8.8 8.8 8.6     Hepatic:   Recent Labs     03/01/20  0605 03/02/20  0611 03/03/20  0659   AST 57* 46* 38*   ALT 49* 50* 48*   BILITOT 0.7 0.7 0.7   ALKPHOS 97 98 98     Troponin:   No results for input(s): TROPONINI in the last 72 hours. BNP: No results for input(s): BNP in the last 72 hours. Lipids: No results for input(s): CHOL, TRIG, HDL, LDLCALC, LABVLDL in the last 72 hours. ABGs: No results for input(s): PHART, PO2ART, QVF1YLS in the last 72 hours. INR: No results for input(s): INR in the last 72 hours. UA:No results for input(s): Johnice Blocker, GLUCOSEU, BILIRUBINUR, Nereyda Novel, BLOODU, PHUR, PROTEINU, UROBILINOGEN, NITRU, LEUKOCYTESUR, LABMICR, URINETYPE in the last 72 hours. Urine Microscopic: No results for input(s): LABCAST, BACTERIA, COMU, HYALCAST, WBCUA, RBCUA, EPIU in the last 72 hours. Urine Culture: No results for input(s): LABURIN in the last 72 hours. Urine Chemistry:   No results for input(s): [place] [employer], PROTEINUR, NAUR in the last 72 hours. IMAGING:  MRI ABDOMEN W WO CONTRAST   Final Result   1.  Mild periportal edema left hepatic lobe possibly related to hepatitis, but   this can be seen with segmental atrophy of the liver (correlate

## 2020-03-04 ENCOUNTER — ANESTHESIA (OUTPATIENT)
Dept: ENDOSCOPY | Age: 35
DRG: 418 | End: 2020-03-04
Payer: COMMERCIAL

## 2020-03-04 VITALS — SYSTOLIC BLOOD PRESSURE: 85 MMHG | DIASTOLIC BLOOD PRESSURE: 52 MMHG | OXYGEN SATURATION: 100 %

## 2020-03-04 LAB
ALBUMIN SERPL-MCNC: 2.7 G/DL (ref 3.4–5)
ALP BLD-CCNC: 91 U/L (ref 40–129)
ALT SERPL-CCNC: 39 U/L (ref 10–40)
AST SERPL-CCNC: 27 U/L (ref 15–37)
BILIRUB SERPL-MCNC: 0.7 MG/DL (ref 0–1)
BILIRUBIN DIRECT: <0.2 MG/DL (ref 0–0.3)
BILIRUBIN, INDIRECT: ABNORMAL MG/DL (ref 0–1)
CA 19-9: 1 U/ML (ref 0–35)
TOTAL PROTEIN: 7.1 G/DL (ref 6.4–8.2)

## 2020-03-04 PROCEDURE — 6360000002 HC RX W HCPCS: Performed by: NURSE ANESTHETIST, CERTIFIED REGISTERED

## 2020-03-04 PROCEDURE — 2500000003 HC RX 250 WO HCPCS: Performed by: NURSE ANESTHETIST, CERTIFIED REGISTERED

## 2020-03-04 PROCEDURE — 2580000003 HC RX 258: Performed by: NURSE ANESTHETIST, CERTIFIED REGISTERED

## 2020-03-04 PROCEDURE — 3700000001 HC ADD 15 MINUTES (ANESTHESIA): Performed by: INTERNAL MEDICINE

## 2020-03-04 PROCEDURE — APPNB30 APP NON BILLABLE TIME 0-30 MINS: Performed by: NURSE PRACTITIONER

## 2020-03-04 PROCEDURE — 6360000002 HC RX W HCPCS: Performed by: ANESTHESIOLOGY

## 2020-03-04 PROCEDURE — 1200000000 HC SEMI PRIVATE

## 2020-03-04 PROCEDURE — 88305 TISSUE EXAM BY PATHOLOGIST: CPT

## 2020-03-04 PROCEDURE — 99222 1ST HOSP IP/OBS MODERATE 55: CPT | Performed by: SURGERY

## 2020-03-04 PROCEDURE — C1753 CATH, INTRAVAS ULTRASOUND: HCPCS | Performed by: INTERNAL MEDICINE

## 2020-03-04 PROCEDURE — 94760 N-INVAS EAR/PLS OXIMETRY 1: CPT

## 2020-03-04 PROCEDURE — 2709999900 HC NON-CHARGEABLE SUPPLY: Performed by: INTERNAL MEDICINE

## 2020-03-04 PROCEDURE — 0DB98ZX EXCISION OF DUODENUM, VIA NATURAL OR ARTIFICIAL OPENING ENDOSCOPIC, DIAGNOSTIC: ICD-10-PCS | Performed by: INTERNAL MEDICINE

## 2020-03-04 PROCEDURE — 7100000000 HC PACU RECOVERY - FIRST 15 MIN: Performed by: INTERNAL MEDICINE

## 2020-03-04 PROCEDURE — 3609018500 HC EGD US SCOPE W/ADJACENT STRUCTURES: Performed by: INTERNAL MEDICINE

## 2020-03-04 PROCEDURE — 2580000003 HC RX 258: Performed by: INTERNAL MEDICINE

## 2020-03-04 PROCEDURE — 80076 HEPATIC FUNCTION PANEL: CPT

## 2020-03-04 PROCEDURE — 7100000001 HC PACU RECOVERY - ADDTL 15 MIN: Performed by: INTERNAL MEDICINE

## 2020-03-04 PROCEDURE — 3700000000 HC ANESTHESIA ATTENDED CARE: Performed by: INTERNAL MEDICINE

## 2020-03-04 PROCEDURE — 3609012400 HC EGD TRANSORAL BIOPSY SINGLE/MULTIPLE: Performed by: INTERNAL MEDICINE

## 2020-03-04 RX ORDER — KETAMINE HCL IN NACL, ISO-OSM 100MG/10ML
SYRINGE (ML) INJECTION PRN
Status: DISCONTINUED | OUTPATIENT
Start: 2020-03-04 | End: 2020-03-04 | Stop reason: SDUPTHER

## 2020-03-04 RX ORDER — FENTANYL CITRATE 50 UG/ML
25 INJECTION, SOLUTION INTRAMUSCULAR; INTRAVENOUS EVERY 5 MIN PRN
Status: DISCONTINUED | OUTPATIENT
Start: 2020-03-04 | End: 2020-03-04 | Stop reason: HOSPADM

## 2020-03-04 RX ORDER — LIDOCAINE HYDROCHLORIDE 20 MG/ML
INJECTION, SOLUTION EPIDURAL; INFILTRATION; INTRACAUDAL; PERINEURAL PRN
Status: DISCONTINUED | OUTPATIENT
Start: 2020-03-04 | End: 2020-03-04 | Stop reason: SDUPTHER

## 2020-03-04 RX ORDER — SODIUM CHLORIDE 9 MG/ML
INJECTION, SOLUTION INTRAVENOUS CONTINUOUS PRN
Status: DISCONTINUED | OUTPATIENT
Start: 2020-03-04 | End: 2020-03-04 | Stop reason: SDUPTHER

## 2020-03-04 RX ORDER — PROPOFOL 10 MG/ML
INJECTION, EMULSION INTRAVENOUS PRN
Status: DISCONTINUED | OUTPATIENT
Start: 2020-03-04 | End: 2020-03-04 | Stop reason: SDUPTHER

## 2020-03-04 RX ORDER — HYDROMORPHONE HCL 110MG/55ML
0.5 PATIENT CONTROLLED ANALGESIA SYRINGE INTRAVENOUS EVERY 5 MIN PRN
Status: DISCONTINUED | OUTPATIENT
Start: 2020-03-04 | End: 2020-03-04 | Stop reason: HOSPADM

## 2020-03-04 RX ORDER — LORAZEPAM 2 MG/ML
1 INJECTION INTRAMUSCULAR
Status: COMPLETED | OUTPATIENT
Start: 2020-03-04 | End: 2020-03-04

## 2020-03-04 RX ORDER — LABETALOL HYDROCHLORIDE 5 MG/ML
5 INJECTION, SOLUTION INTRAVENOUS EVERY 10 MIN PRN
Status: DISCONTINUED | OUTPATIENT
Start: 2020-03-04 | End: 2020-03-04 | Stop reason: HOSPADM

## 2020-03-04 RX ORDER — PHENYLEPHRINE HCL IN 0.9% NACL 1 MG/10 ML
SYRINGE (ML) INTRAVENOUS PRN
Status: DISCONTINUED | OUTPATIENT
Start: 2020-03-04 | End: 2020-03-04 | Stop reason: SDUPTHER

## 2020-03-04 RX ORDER — PROMETHAZINE HYDROCHLORIDE 25 MG/ML
6.25 INJECTION, SOLUTION INTRAMUSCULAR; INTRAVENOUS
Status: DISCONTINUED | OUTPATIENT
Start: 2020-03-04 | End: 2020-03-04 | Stop reason: HOSPADM

## 2020-03-04 RX ORDER — PROPOFOL 10 MG/ML
INJECTION, EMULSION INTRAVENOUS CONTINUOUS PRN
Status: DISCONTINUED | OUTPATIENT
Start: 2020-03-04 | End: 2020-03-04 | Stop reason: SDUPTHER

## 2020-03-04 RX ADMIN — Medication 100 MCG: at 13:46

## 2020-03-04 RX ADMIN — Medication 100 MCG: at 13:24

## 2020-03-04 RX ADMIN — Medication 20 MG: at 13:04

## 2020-03-04 RX ADMIN — LIDOCAINE HYDROCHLORIDE 60 MG: 20 INJECTION, SOLUTION EPIDURAL; INFILTRATION; INTRACAUDAL; PERINEURAL at 13:02

## 2020-03-04 RX ADMIN — Medication 10 ML: at 09:58

## 2020-03-04 RX ADMIN — SODIUM CHLORIDE: 9 INJECTION, SOLUTION INTRAVENOUS at 12:56

## 2020-03-04 RX ADMIN — PROPOFOL 140 MCG/KG/MIN: 10 INJECTION, EMULSION INTRAVENOUS at 13:02

## 2020-03-04 RX ADMIN — PROPOFOL 150 MG: 10 INJECTION, EMULSION INTRAVENOUS at 13:02

## 2020-03-04 RX ADMIN — LORAZEPAM 1 MG: 2 INJECTION INTRAMUSCULAR; INTRAVENOUS at 12:50

## 2020-03-04 RX ADMIN — Medication 10 ML: at 19:46

## 2020-03-04 RX ADMIN — Medication 200 MCG: at 13:41

## 2020-03-04 ASSESSMENT — PAIN DESCRIPTION - PROGRESSION

## 2020-03-04 ASSESSMENT — PULMONARY FUNCTION TESTS
PIF_VALUE: 0

## 2020-03-04 ASSESSMENT — PAIN DESCRIPTION - PAIN TYPE: TYPE: ACUTE PAIN

## 2020-03-04 ASSESSMENT — PAIN SCALES - GENERAL: PAINLEVEL_OUTOF10: 0

## 2020-03-04 ASSESSMENT — PAIN - FUNCTIONAL ASSESSMENT: PAIN_FUNCTIONAL_ASSESSMENT: 0-10

## 2020-03-04 NOTE — PROGRESS NOTES
X3 NAD  Skin: no rash, turgor wnl  Heent:  eomi, mmm  Neck: no bruits or jvd noted  Cardiovascular:  S1, S2 without m/r/g  Respiratory: CTA B without w/r/r  Abdomen:  +bs, soft, nt, nd  Ext: no  lower extremity edema  Psychiatric: mood and affect appropriate  Musculoskeletal:  Rom, muscular strength intact    Labs:  CBC:   No results for input(s): WBC, HGB, PLT in the last 72 hours. BMP:    Recent Labs     03/02/20  0611 03/03/20  0658   * 136   K 4.7 4.8   CL 95* 95*   CO2 26 27   BUN 11 11   CREATININE 0.7* 0.7*   GLUCOSE 95 95     Ca/Mg/Phos:   Recent Labs     03/02/20  0611 03/03/20  0658   CALCIUM 8.8 8.6     Hepatic:   Recent Labs     03/02/20  0611 03/03/20  0659 03/04/20  0621   AST 46* 38* 27   ALT 50* 48* 39   BILITOT 0.7 0.7 0.7   ALKPHOS 98 98 91     Troponin:   No results for input(s): TROPONINI in the last 72 hours. BNP: No results for input(s): BNP in the last 72 hours. Lipids: No results for input(s): CHOL, TRIG, HDL, LDLCALC, LABVLDL in the last 72 hours. ABGs: No results for input(s): PHART, PO2ART, HTF3JNL in the last 72 hours. INR: No results for input(s): INR in the last 72 hours. UA:No results for input(s): Marium Shines, GLUCOSEU, BILIRUBINUR, Renaee Navarre, BLOODU, PHUR, PROTEINU, UROBILINOGEN, NITRU, LEUKOCYTESUR, LABMICR, URINETYPE in the last 72 hours. Urine Microscopic: No results for input(s): LABCAST, BACTERIA, COMU, HYALCAST, WBCUA, RBCUA, EPIU in the last 72 hours. Urine Culture: No results for input(s): LABURIN in the last 72 hours. Urine Chemistry:   No results for input(s): Alberto Deer, PROTEINUR, NAUR in the last 72 hours. IMAGING:  MRI ABDOMEN W WO CONTRAST   Final Result   1. Mild periportal edema left hepatic lobe possibly related to hepatitis, but   this can be seen with segmental atrophy of the liver (correlate with remote   history of trauma in this region). 2. Nonspecific mild central left hepatic bile duct dilatation.   Suboptimally evaluated without MRCP series. ERCP or MRCP correlation may be useful for   additional characterization. I suspect this to reflect segmental atrophy of   the liver, possibly remote posttraumatic. Focal cholangitis is a   consideration. The patient's age as well as absence of underlying   enhancement abnormality makes the consideration of malignant etiology (such   as cholangiocarcinoma) very unlikely. 3. Nonspecific gerri hepatis and portacaval lymph node enlargement. Involvement with malignancy is a consideration though these may simply be   reactive. 4. No cholelithiasis is evident, however findings are seen which can reflect   cholecystitis. CT ABDOMEN PELVIS W IV CONTRAST Additional Contrast? Oral   Final Result   Chest CT: No acute or suspicious abnormality identified. Abdomen and pelvis CT: Heterogeneous hypodensity within the left hepatic lobe   anteriorly and inferiorly. That may be secondary to heterogeneous fatty   infiltration versus focal inflammation. Neoplasm does remain a concern. Further evaluation with a dedicated hepatic MRI recommended. Periportal edema. That is a nonspecific finding, and can be secondary to   localized disease (such as hepatitis), but can also be secondary to systemic   etiologies (such as third-spacing). CT CHEST W CONTRAST   Final Result   Chest CT: No acute or suspicious abnormality identified. Abdomen and pelvis CT: Heterogeneous hypodensity within the left hepatic lobe   anteriorly and inferiorly. That may be secondary to heterogeneous fatty   infiltration versus focal inflammation. Neoplasm does remain a concern. Further evaluation with a dedicated hepatic MRI recommended. Periportal edema. That is a nonspecific finding, and can be secondary to   localized disease (such as hepatitis), but can also be secondary to systemic   etiologies (such as third-spacing).          XR CHEST PORTABLE   Final Result   Unremarkable chest.           No intake/output data recorded. Assessment/Plan :      1. Hyponatremia. Sodium level 123----> 128 -----> 130--->132--->136 ---> 135 ---> 136   Urine lytes reviewed - SIADH     2. H/o GERD, duodenitis, elevated LFT   EUS per GI  PPI   MRI Liver shows irregular left hepatic duct system.      Check BMP in am             Thank you for allowing us to participate in care of Stefani Cardozo         Electronically signed by: Fady Pabon MD, 3/4/2020, 11:20 AM      [facility]  Office : [phone]  Fax :[phone]

## 2020-03-04 NOTE — H&P
[facility]   Pre-operative History and Physical    Patient: Mohan Campbell  : 1985  Acct#:     HISTORY OF PRESENT ILLNESS:    The patient is a 37 y.o. male who presents with  poor appetite, nausea. Prior EGD 2017 was normal.  Found with abnormal lft's and CT showed periportal edema with a hypodensity in the left lobe and MRI liver shows an irregular left hepatic duct system without mass lesions. There is GB thickening and pericholecystic fluid. Asked for EGD and EUS for further evaluation. Past Medical History:        Diagnosis Date    Anxiety     GERD (gastroesophageal reflux disease)     IBS (irritable bowel syndrome)       Past Surgical History:        Procedure Laterality Date    COLONOSCOPY      EXCISION / BIOPSY SKIN LESION OF TRUNK N/A 2018    EXCISION OF SEBACEOUS CYST ON BACK performed by Libertad Harris MD at [address] [place] [address]      Medications Prior to Admission:   No current facility-administered medications on file prior to encounter.       Current Outpatient Medications on File Prior to Encounter   Medication Sig Dispense Refill    tiZANidine (ZANAFLEX) 4 MG tablet Take 1 tablet by mouth 3 times daily as needed (PAIN) 30 tablet 1    omeprazole (PRILOSEC) 40 MG delayed release capsule TAKE ONE CAPSULE BY MOUTH DAILY 30 capsule 11        Allergies:  Amoxicillin-pot clavulanate; Citalopram; Midrin [apap-isometheptene-dichloral]; and Penicillins    Social History:   Social History     Socioeconomic History    Marital status: Single     Spouse name: Not on file    Number of children: Not on file    Years of education: Not on file    Highest education level: Not on file   Occupational History    Not on file   Social Needs    Financial resource strain: Not on file    Food insecurity:     Worry: Not on file     Inability: Not on file    Transportation needs:     Medical: Not on file     Non-medical: Not on file   Tobacco Use    Smoking status: Current Every Day Smoker     Packs/day: 0.50     Years: 23.00     Pack years: 11.50     Types: Cigarettes    Smokeless tobacco: Former User     Types: Chew   Substance and Sexual Activity    Alcohol use: Yes    Drug use: No    Sexual activity: Not on file   Lifestyle    Physical activity:     Days per week: Not on file     Minutes per session: Not on file    Stress: Not on file   Relationships    Social connections:     Talks on phone: Not on file     Gets together: Not on file     Attends Zoroastrianism service: Not on file     Active member of club or organization: Not on file     Attends meetings of clubs or organizations: Not on file     Relationship status: Not on file    Intimate partner violence:     Fear of current or ex partner: Not on file     Emotionally abused: Not on file     Physically abused: Not on file     Forced sexual activity: Not on file   Other Topics Concern    Not on file   Social History Narrative    Not on file      Family History:       Problem Relation Age of Onset    Hypertension Father     Cancer Father     Cancer Other         PHYSICAL EXAM:      /68   Pulse 98   Temp 97.9 °F (36.6 °C) (Temporal)   Resp 16   Ht 5' 11\" (1.803 m)   Wt 159 lb (72.1 kg)   SpO2 98%   BMI 22.18 kg/m²  I        Heart:  RRR    Lungs:  CTA b    Abdomen:  S/NT/ND/+BS      ASSESSMENT AND PLAN:  ASA: per anesthesia  Mallampati: per anesthesia  1. Patient is a 37 y.o. male here for EUS and EGD   2. Procedure options, risks and benefits reviewed with the patient. The patient expresses understanding.     Matt Dela Cruz

## 2020-03-04 NOTE — ANESTHESIA POSTPROCEDURE EVALUATION
[facility] Department of Anesthesiology  Post-Anesthesia Note       Name:  Daphne Plummer                                  Age:  37 y.o. MRN:  [id number]     Last Vitals & Oxygen Saturation: /74   Pulse 100   Temp 97 °F (36.1 °C) (Temporal)   Resp 16   Ht 5' 11\" (1.803 m)   Wt 159 lb (72.1 kg)   SpO2 97%   BMI 22.18 kg/m²   Patient Vitals for the past 4 hrs:   BP Temp Temp src Pulse Resp SpO2 Height Weight   03/04/20 1405 105/74 97 °F (36.1 °C) Temporal 100 16 97 % -- --   03/04/20 1400 104/81 -- -- 107 23 98 % -- --   03/04/20 1355 (!) 85/59 -- -- 96 16 97 % -- --   03/04/20 1350 (!) 86/53 -- -- 75 12 96 % -- --   03/04/20 1345 (!) 88/56 -- -- 77 12 96 % -- --   03/04/20 1340 116/88 -- -- 64 16 97 % -- --   03/04/20 1335 (!) 68/46 97 °F (36.1 °C) Temporal 91 17 97 % -- --   03/04/20 1229 108/68 97.9 °F (36.6 °C) Temporal 98 16 98 % 5' 11\" (1.803 m) 159 lb (72.1 kg)       Level of consciousness:  Awake, alert    Respiratory: Respirations easy, no distress. Stable. Cardiovascular: Hemodynamically stable. Hydration: Adequate. PONV: Adequately managed. Post-op pain: Adequately controlled. Post-op assessment: Tolerated anesthetic well without complication. Complications:  None.     Kiko Garcia MD  March 4, 2020   2:20 PM

## 2020-03-04 NOTE — ANESTHESIA PRE PROCEDURE
Provider Last Rate Last Dose    pantoprazole (PROTONIX) tablet 40 mg  40 mg Oral QAM AC Corinna Ward MD   40 mg at 20 0641    tiZANidine (ZANAFLEX) tablet 4 mg  4 mg Oral TID PRN Kim Mojica MD        promethazine (PHENERGAN) injection 6.25 mg  6.25 mg Intramuscular Q6H PRN Kim Mojica MD        sodium chloride flush 0.9 % injection 10 mL  10 mL Intravenous 2 times per day Kim Mojica MD   10 mL at 20 0958    sodium chloride flush 0.9 % injection 10 mL  10 mL Intravenous PRN Kim Mojica MD        acetaminophen (TYLENOL) tablet 650 mg  650 mg Oral Q6H PRN Kim Mojica MD        acetaminophen (TYLENOL) suppository 650 mg  650 mg Rectal Q6H PRN Skyler Moreno MD        polyethylene glycol (GLYCOLAX) packet 17 g  17 g Oral Daily PRN Kim Mojica MD        promethazine (PHENERGAN) tablet 12.5 mg  12.5 mg Oral Q6H PRN Kim Mojica MD        ondansetron (ZOFRAN) injection 4 mg  4 mg Intravenous Q6H PRN Kim Mojica MD   4 mg at 20 2123     Vital Signs (Current)   Vitals:    20 0956   BP: 104/69   Pulse: 96   Resp: 18   Temp: 98 °F (36.7 °C)   SpO2: 96%     Vital Signs Statistics (for past 48 hrs)     Temp  Av.2 °F (36.8 °C)  Min: 97.7 °F (36.5 °C)   Min taken time: 20 1135  Max: 98.7 °F (37.1 °C)   Max taken time: 20 0945  Pulse  Av.1  Min: 80   Min taken time: 20 0427  Max: 107   Max taken time: 20 1448  Resp  Av.1  Min: 25   Min taken time: 20 0956  Max: 23   Max taken time: 20 1135  BP  Min: 80/60   Min taken time: 20 1641  Max: 107/70   Max taken time: 20 2152  MAP (mmHg)  Av  Min: 67   Min taken time: 20 1641  Max: 82   Max taken time: 20 2100  SpO2  Av.1 %  Min: 94 %   Min taken time: 20 0500  Max: 98 %   Max taken time: 20 1641    BP Readings from Last 3 Encounters:   20 104/69   20 111/69   02/15/20 (!) 116/90     BMI  Body Rhythm: regular  Rate: normal           Beta Blocker:  Not on Beta Blocker         Neuro/Psych:   (+) psychiatric history:depression/anxiety             GI/Hepatic/Renal:   (+) GERD:,          ROS comment: crohns. Endo/Other: Negative Endo/Other ROS                    Abdominal:           Vascular: negative vascular ROS. Anesthesia Plan      MAC     ASA 3       Induction: intravenous. MIPS: Postoperative opioids intended. Anesthetic plan and risks discussed with patient. Plan discussed with CRNA. This pre-anesthesia assessment may be used as a history and physical.    DOS STAFF ADDENDUM:    Pt seen and examined, chart reviewed (including anesthesia, drug and allergy history). No interval changes to history and physical examination. Anesthetic plan, risks, benefits, alternatives, and personnel involved discussed with patient. Questions and concerns addressed. Patient(family) verbalized an understanding and agrees to proceed.       Ayleen Rodriguez MD  March 4, 2020  12:23 PM

## 2020-03-04 NOTE — PROGRESS NOTES
Adm to pacu from endo per bed unresponsive. Respirations easy & symmetrical. Abdomen soft. BP low. Abelino given per crna. See anesthesia record.

## 2020-03-05 ENCOUNTER — ANESTHESIA EVENT (OUTPATIENT)
Dept: OPERATING ROOM | Age: 35
DRG: 418 | End: 2020-03-05
Payer: COMMERCIAL

## 2020-03-05 ENCOUNTER — ANESTHESIA (OUTPATIENT)
Dept: OPERATING ROOM | Age: 35
DRG: 418 | End: 2020-03-05
Payer: COMMERCIAL

## 2020-03-05 ENCOUNTER — APPOINTMENT (OUTPATIENT)
Dept: GENERAL RADIOLOGY | Age: 35
DRG: 418 | End: 2020-03-05
Payer: COMMERCIAL

## 2020-03-05 VITALS — SYSTOLIC BLOOD PRESSURE: 168 MMHG | OXYGEN SATURATION: 100 % | DIASTOLIC BLOOD PRESSURE: 100 MMHG | TEMPERATURE: 89.2 F

## 2020-03-05 LAB
ALBUMIN SERPL-MCNC: 2.6 G/DL (ref 3.4–5)
ALP BLD-CCNC: 87 U/L (ref 40–129)
ALT SERPL-CCNC: 34 U/L (ref 10–40)
ANION GAP SERPL CALCULATED.3IONS-SCNC: 15 MMOL/L (ref 3–16)
AST SERPL-CCNC: 27 U/L (ref 15–37)
BILIRUB SERPL-MCNC: 0.6 MG/DL (ref 0–1)
BILIRUBIN DIRECT: <0.2 MG/DL (ref 0–0.3)
BILIRUBIN, INDIRECT: ABNORMAL MG/DL (ref 0–1)
BUN BLDV-MCNC: 10 MG/DL (ref 7–20)
CALCIUM SERPL-MCNC: 8.6 MG/DL (ref 8.3–10.6)
CHLORIDE BLD-SCNC: 94 MMOL/L (ref 99–110)
CO2: 25 MMOL/L (ref 21–32)
CREAT SERPL-MCNC: 0.6 MG/DL (ref 0.9–1.3)
GFR AFRICAN AMERICAN: >60
GFR NON-AFRICAN AMERICAN: >60
GLUCOSE BLD-MCNC: 88 MG/DL (ref 70–99)
POTASSIUM SERPL-SCNC: 4.2 MMOL/L (ref 3.5–5.1)
SODIUM BLD-SCNC: 134 MMOL/L (ref 136–145)
TOTAL PROTEIN: 6.9 G/DL (ref 6.4–8.2)

## 2020-03-05 PROCEDURE — 2580000003 HC RX 258: Performed by: NURSE ANESTHETIST, CERTIFIED REGISTERED

## 2020-03-05 PROCEDURE — 2580000003 HC RX 258: Performed by: INTERNAL MEDICINE

## 2020-03-05 PROCEDURE — 6360000004 HC RX CONTRAST MEDICATION: Performed by: SURGERY

## 2020-03-05 PROCEDURE — 6360000002 HC RX W HCPCS: Performed by: SURGERY

## 2020-03-05 PROCEDURE — 88304 TISSUE EXAM BY PATHOLOGIST: CPT

## 2020-03-05 PROCEDURE — 74300 X-RAY BILE DUCTS/PANCREAS: CPT

## 2020-03-05 PROCEDURE — 2500000003 HC RX 250 WO HCPCS: Performed by: NURSE ANESTHETIST, CERTIFIED REGISTERED

## 2020-03-05 PROCEDURE — BF141ZZ FLUOROSCOPY OF GALLBLADDER, BILE DUCTS AND PANCREATIC DUCTS USING LOW OSMOLAR CONTRAST: ICD-10-PCS | Performed by: SURGERY

## 2020-03-05 PROCEDURE — 36415 COLL VENOUS BLD VENIPUNCTURE: CPT

## 2020-03-05 PROCEDURE — 6360000002 HC RX W HCPCS: Performed by: ANESTHESIOLOGY

## 2020-03-05 PROCEDURE — 80048 BASIC METABOLIC PNL TOTAL CA: CPT

## 2020-03-05 PROCEDURE — 1200000000 HC SEMI PRIVATE

## 2020-03-05 PROCEDURE — 6360000002 HC RX W HCPCS: Performed by: NURSE ANESTHETIST, CERTIFIED REGISTERED

## 2020-03-05 PROCEDURE — 2580000003 HC RX 258: Performed by: SURGERY

## 2020-03-05 PROCEDURE — 99231 SBSQ HOSP IP/OBS SF/LOW 25: CPT | Performed by: SURGERY

## 2020-03-05 PROCEDURE — 7100000001 HC PACU RECOVERY - ADDTL 15 MIN: Performed by: SURGERY

## 2020-03-05 PROCEDURE — 2720000010 HC SURG SUPPLY STERILE: Performed by: SURGERY

## 2020-03-05 PROCEDURE — 47563 LAPARO CHOLECYSTECTOMY/GRAPH: CPT | Performed by: SURGERY

## 2020-03-05 PROCEDURE — 3600000004 HC SURGERY LEVEL 4 BASE: Performed by: SURGERY

## 2020-03-05 PROCEDURE — 3600000014 HC SURGERY LEVEL 4 ADDTL 15MIN: Performed by: SURGERY

## 2020-03-05 PROCEDURE — 0FT44ZZ RESECTION OF GALLBLADDER, PERCUTANEOUS ENDOSCOPIC APPROACH: ICD-10-PCS | Performed by: SURGERY

## 2020-03-05 PROCEDURE — 3700000001 HC ADD 15 MINUTES (ANESTHESIA): Performed by: SURGERY

## 2020-03-05 PROCEDURE — 2709999900 HC NON-CHARGEABLE SUPPLY: Performed by: SURGERY

## 2020-03-05 PROCEDURE — 94760 N-INVAS EAR/PLS OXIMETRY 1: CPT

## 2020-03-05 PROCEDURE — 80076 HEPATIC FUNCTION PANEL: CPT

## 2020-03-05 PROCEDURE — 7100000000 HC PACU RECOVERY - FIRST 15 MIN: Performed by: SURGERY

## 2020-03-05 PROCEDURE — 3700000000 HC ANESTHESIA ATTENDED CARE: Performed by: SURGERY

## 2020-03-05 RX ORDER — ROCURONIUM BROMIDE 10 MG/ML
INJECTION, SOLUTION INTRAVENOUS PRN
Status: DISCONTINUED | OUTPATIENT
Start: 2020-03-05 | End: 2020-03-05 | Stop reason: SDUPTHER

## 2020-03-05 RX ORDER — FENTANYL CITRATE 50 UG/ML
INJECTION, SOLUTION INTRAMUSCULAR; INTRAVENOUS PRN
Status: DISCONTINUED | OUTPATIENT
Start: 2020-03-05 | End: 2020-03-05 | Stop reason: SDUPTHER

## 2020-03-05 RX ORDER — ONDANSETRON 2 MG/ML
4 INJECTION INTRAMUSCULAR; INTRAVENOUS
Status: DISCONTINUED | OUTPATIENT
Start: 2020-03-05 | End: 2020-03-05 | Stop reason: HOSPADM

## 2020-03-05 RX ORDER — MORPHINE SULFATE 4 MG/ML
4 INJECTION, SOLUTION INTRAMUSCULAR; INTRAVENOUS
Status: DISCONTINUED | OUTPATIENT
Start: 2020-03-05 | End: 2020-03-07 | Stop reason: HOSPADM

## 2020-03-05 RX ORDER — HYDROMORPHONE HCL 110MG/55ML
0.5 PATIENT CONTROLLED ANALGESIA SYRINGE INTRAVENOUS EVERY 5 MIN PRN
Status: DISCONTINUED | OUTPATIENT
Start: 2020-03-05 | End: 2020-03-05 | Stop reason: HOSPADM

## 2020-03-05 RX ORDER — LIDOCAINE HYDROCHLORIDE 20 MG/ML
INJECTION, SOLUTION EPIDURAL; INFILTRATION; INTRACAUDAL; PERINEURAL PRN
Status: DISCONTINUED | OUTPATIENT
Start: 2020-03-05 | End: 2020-03-05 | Stop reason: SDUPTHER

## 2020-03-05 RX ORDER — ONDANSETRON 2 MG/ML
INJECTION INTRAMUSCULAR; INTRAVENOUS PRN
Status: DISCONTINUED | OUTPATIENT
Start: 2020-03-05 | End: 2020-03-05 | Stop reason: SDUPTHER

## 2020-03-05 RX ORDER — SODIUM CHLORIDE 9 MG/ML
INJECTION, SOLUTION INTRAVENOUS CONTINUOUS PRN
Status: DISCONTINUED | OUTPATIENT
Start: 2020-03-05 | End: 2020-03-05 | Stop reason: SDUPTHER

## 2020-03-05 RX ORDER — MAGNESIUM HYDROXIDE 1200 MG/15ML
LIQUID ORAL CONTINUOUS PRN
Status: COMPLETED | OUTPATIENT
Start: 2020-03-05 | End: 2020-03-05

## 2020-03-05 RX ORDER — SODIUM CHLORIDE, SODIUM LACTATE, POTASSIUM CHLORIDE, AND CALCIUM CHLORIDE .6; .31; .03; .02 G/100ML; G/100ML; G/100ML; G/100ML
IRRIGANT IRRIGATION
Status: COMPLETED | OUTPATIENT
Start: 2020-03-05 | End: 2020-03-05

## 2020-03-05 RX ORDER — OXYCODONE HYDROCHLORIDE AND ACETAMINOPHEN 5; 325 MG/1; MG/1
1 TABLET ORAL EVERY 6 HOURS PRN
Status: DISCONTINUED | OUTPATIENT
Start: 2020-03-05 | End: 2020-03-07 | Stop reason: HOSPADM

## 2020-03-05 RX ORDER — MIDAZOLAM HYDROCHLORIDE 1 MG/ML
1 INJECTION INTRAMUSCULAR; INTRAVENOUS EVERY 5 MIN PRN
Status: COMPLETED | OUTPATIENT
Start: 2020-03-05 | End: 2020-03-05

## 2020-03-05 RX ORDER — GLYCOPYRROLATE 1 MG/5 ML
SYRINGE (ML) INTRAVENOUS PRN
Status: DISCONTINUED | OUTPATIENT
Start: 2020-03-05 | End: 2020-03-05 | Stop reason: SDUPTHER

## 2020-03-05 RX ORDER — DEXAMETHASONE SODIUM PHOSPHATE 4 MG/ML
INJECTION, SOLUTION INTRA-ARTICULAR; INTRALESIONAL; INTRAMUSCULAR; INTRAVENOUS; SOFT TISSUE PRN
Status: DISCONTINUED | OUTPATIENT
Start: 2020-03-05 | End: 2020-03-05 | Stop reason: SDUPTHER

## 2020-03-05 RX ORDER — MEPERIDINE HYDROCHLORIDE 25 MG/ML
12.5 INJECTION INTRAMUSCULAR; INTRAVENOUS; SUBCUTANEOUS EVERY 5 MIN PRN
Status: DISCONTINUED | OUTPATIENT
Start: 2020-03-05 | End: 2020-03-05 | Stop reason: HOSPADM

## 2020-03-05 RX ORDER — MORPHINE SULFATE 2 MG/ML
2 INJECTION, SOLUTION INTRAMUSCULAR; INTRAVENOUS
Status: DISCONTINUED | OUTPATIENT
Start: 2020-03-05 | End: 2020-03-07 | Stop reason: HOSPADM

## 2020-03-05 RX ORDER — SUCCINYLCHOLINE CHLORIDE 20 MG/ML
INJECTION INTRAMUSCULAR; INTRAVENOUS PRN
Status: DISCONTINUED | OUTPATIENT
Start: 2020-03-05 | End: 2020-03-05 | Stop reason: SDUPTHER

## 2020-03-05 RX ORDER — KETAMINE HYDROCHLORIDE 10 MG/ML
INJECTION, SOLUTION INTRAMUSCULAR; INTRAVENOUS PRN
Status: DISCONTINUED | OUTPATIENT
Start: 2020-03-05 | End: 2020-03-05 | Stop reason: SDUPTHER

## 2020-03-05 RX ORDER — HYDRALAZINE HYDROCHLORIDE 20 MG/ML
5 INJECTION INTRAMUSCULAR; INTRAVENOUS EVERY 10 MIN PRN
Status: DISCONTINUED | OUTPATIENT
Start: 2020-03-05 | End: 2020-03-05 | Stop reason: HOSPADM

## 2020-03-05 RX ORDER — NEOSTIGMINE METHYLSULFATE 5 MG/5 ML
SYRINGE (ML) INTRAVENOUS PRN
Status: DISCONTINUED | OUTPATIENT
Start: 2020-03-05 | End: 2020-03-05 | Stop reason: SDUPTHER

## 2020-03-05 RX ORDER — PROPOFOL 10 MG/ML
INJECTION, EMULSION INTRAVENOUS PRN
Status: DISCONTINUED | OUTPATIENT
Start: 2020-03-05 | End: 2020-03-05 | Stop reason: SDUPTHER

## 2020-03-05 RX ORDER — KETOROLAC TROMETHAMINE 15 MG/ML
15 INJECTION, SOLUTION INTRAMUSCULAR; INTRAVENOUS EVERY 6 HOURS PRN
Status: DISCONTINUED | OUTPATIENT
Start: 2020-03-05 | End: 2020-03-07 | Stop reason: HOSPADM

## 2020-03-05 RX ORDER — LABETALOL HYDROCHLORIDE 5 MG/ML
5 INJECTION, SOLUTION INTRAVENOUS EVERY 10 MIN PRN
Status: DISCONTINUED | OUTPATIENT
Start: 2020-03-05 | End: 2020-03-05 | Stop reason: HOSPADM

## 2020-03-05 RX ORDER — HYDROCODONE BITARTRATE AND ACETAMINOPHEN 5; 325 MG/1; MG/1
1 TABLET ORAL EVERY 4 HOURS PRN
Qty: 25 TABLET | Refills: 0 | Status: SHIPPED | OUTPATIENT
Start: 2020-03-05 | End: 2020-03-12 | Stop reason: ALTCHOICE

## 2020-03-05 RX ADMIN — SODIUM CHLORIDE: 9 INJECTION, SOLUTION INTRAVENOUS at 16:12

## 2020-03-05 RX ADMIN — ROCURONIUM BROMIDE 40 MG: 10 INJECTION, SOLUTION INTRAVENOUS at 15:41

## 2020-03-05 RX ADMIN — SUCCINYLCHOLINE CHLORIDE 120 MG: 20 INJECTION, SOLUTION INTRAMUSCULAR; INTRAVENOUS at 15:23

## 2020-03-05 RX ADMIN — MIDAZOLAM 1 MG: 1 INJECTION INTRAMUSCULAR; INTRAVENOUS at 14:56

## 2020-03-05 RX ADMIN — PROPOFOL 200 MG: 10 INJECTION, EMULSION INTRAVENOUS at 15:23

## 2020-03-05 RX ADMIN — Medication 0.4 MG: at 16:18

## 2020-03-05 RX ADMIN — CEFAZOLIN 2 G: 1 INJECTION, POWDER, FOR SOLUTION INTRAMUSCULAR; INTRAVENOUS at 15:15

## 2020-03-05 RX ADMIN — Medication 3 MG: at 16:18

## 2020-03-05 RX ADMIN — FENTANYL CITRATE 25 MCG: 50 INJECTION, SOLUTION INTRAMUSCULAR; INTRAVENOUS at 16:28

## 2020-03-05 RX ADMIN — MIDAZOLAM 1 MG: 1 INJECTION INTRAMUSCULAR; INTRAVENOUS at 15:14

## 2020-03-05 RX ADMIN — Medication 0.2 MG: at 15:37

## 2020-03-05 RX ADMIN — ONDANSETRON 4 MG: 2 INJECTION INTRAMUSCULAR; INTRAVENOUS at 16:07

## 2020-03-05 RX ADMIN — KETAMINE HYDROCHLORIDE 20 MG: 10 INJECTION, SOLUTION INTRAMUSCULAR; INTRAVENOUS at 15:23

## 2020-03-05 RX ADMIN — FENTANYL CITRATE 25 MCG: 50 INJECTION, SOLUTION INTRAMUSCULAR; INTRAVENOUS at 16:35

## 2020-03-05 RX ADMIN — Medication 0.2 MG: at 16:20

## 2020-03-05 RX ADMIN — FENTANYL CITRATE 50 MCG: 50 INJECTION, SOLUTION INTRAMUSCULAR; INTRAVENOUS at 15:20

## 2020-03-05 RX ADMIN — SODIUM CHLORIDE: 9 INJECTION, SOLUTION INTRAVENOUS at 15:16

## 2020-03-05 RX ADMIN — Medication 1 MG: at 16:20

## 2020-03-05 RX ADMIN — DEXAMETHASONE SODIUM PHOSPHATE 10 MG: 4 INJECTION, SOLUTION INTRAMUSCULAR; INTRAVENOUS at 15:30

## 2020-03-05 RX ADMIN — ROCURONIUM BROMIDE 5 MG: 10 INJECTION, SOLUTION INTRAVENOUS at 15:23

## 2020-03-05 RX ADMIN — LIDOCAINE HYDROCHLORIDE 100 MG: 20 INJECTION, SOLUTION EPIDURAL; INFILTRATION; INTRACAUDAL; PERINEURAL at 15:23

## 2020-03-05 RX ADMIN — KETAMINE HYDROCHLORIDE 10 MG: 10 INJECTION, SOLUTION INTRAMUSCULAR; INTRAVENOUS at 16:07

## 2020-03-05 RX ADMIN — Medication 10 ML: at 20:47

## 2020-03-05 ASSESSMENT — PAIN SCALES - GENERAL: PAINLEVEL_OUTOF10: 2

## 2020-03-05 ASSESSMENT — PULMONARY FUNCTION TESTS
PIF_VALUE: 17
PIF_VALUE: 5
PIF_VALUE: 17
PIF_VALUE: 20
PIF_VALUE: 5
PIF_VALUE: 15
PIF_VALUE: 17
PIF_VALUE: 16
PIF_VALUE: 17

## 2020-03-05 ASSESSMENT — PAIN DESCRIPTION - PROGRESSION
CLINICAL_PROGRESSION: GRADUALLY IMPROVING

## 2020-03-05 NOTE — BRIEF OP NOTE
Brief Postoperative Note  ______________________________________________________________    Patient: Merlene Pineda  YOB: 1985  MRN: [id number]  Date of Procedure: 3/5/2020    Pre-Op Diagnosis: CHOLELITHIASIS    Post-Op Diagnosis: Same       Procedure(s):  CHOLECYSTECTOMY LAPAROSCOPIC WITH CHOLANGIOGRAM    Anesthesia: General    Surgeon(s):  Tate Casiano MD    Estimated Blood Loss (mL): less than 50     Complications: None    Specimens:   ID Type Source Tests Collected by Time Destination   A : A) GALL BLADDER Tissue Gallbladder SURGICAL PATHOLOGY Tate Casiano MD 3/5/2020 1543        Implants:  * No implants in log *      Drains:   NG/OG/NJ/NE Tube Orogastric 16 fr Center mouth (Active)       Findings: GB thickened and distended with sludge. IOC normal    OK for discharge anytime.     Tate Casiano MD  Date: 3/5/2020  Time: 4:20 PM

## 2020-03-05 NOTE — PROGRESS NOTES
Attempted to witness/verify informed consent form with pt; per pt who is alert and oriented x4 and able to make own healthcare decisions, \"Dr. Risa Lynne has not talked to me about the procedure yet. \"; paperwork for informed consent left unsigned at this time; will pass on to dayshift.

## 2020-03-05 NOTE — PROGRESS NOTES
Hospitalist Progress Note      PCP: Alejandrina Judd MD    Date of Admission: 2/27/2020    Chief Complaint: Hyponatremia, wt loss      Subjective:   Denies nausea, emesis, abd pain. ERCP with microlithiasis with echogenic crystals. Planned for OR today for mike    Medications:  Reviewed    Infusion Medications    sodium chloride       Scheduled Medications    pantoprazole  40 mg Oral QAM AC    sodium chloride flush  10 mL Intravenous 2 times per day     PRN Meds: HYDROmorphone, ondansetron, labetalol, hydrALAZINE, meperidine, sodium chloride, tiZANidine, promethazine, sodium chloride flush, acetaminophen, acetaminophen, polyethylene glycol, promethazine, ondansetron    No intake or output data in the 24 hours ending 03/05/20 1548    Exam:    BP 95/65   Pulse 100   Temp 97.3 °F (36.3 °C) (Temporal)   Resp 16   Ht 5' 11\" (1.803 m)   Wt 159 lb (72.1 kg)   SpO2 97%   BMI 22.18 kg/m²     Gen/overall appearance: Not in acute distress. Alert. Head: Normocephalic, atraumatic  Eyes: EOMI, no scleral icterus  CVS: regular rate and rhythm, Normal S1S2  Pulm: Clear to auscultation bilaterally. No crackles/wheezes  Gastrointestinal: Soft, nontender, nondistended, no guarding or rebound  Extremities: No edema. No erythema or warmth  Neuro: No gross focal deficits noted  Skin: Warm, dry    Labs:   No results for input(s): WBC, HGB, HCT, PLT in the last 72 hours. Recent Labs     03/03/20  0658 03/05/20  0657    134*   K 4.8 4.2   CL 95* 94*   CO2 27 25   BUN 11 10   CREATININE 0.7* 0.6*   CALCIUM 8.6 8.6     Recent Labs     03/03/20  0659 03/04/20  0621 03/05/20  0657   AST 38* 27 27   ALT 48* 39 34   BILIDIR 0.3 <0.2 <0.2   BILITOT 0.7 0.7 0.6   ALKPHOS 98 91 87     No results for input(s): INR in the last 72 hours. No results for input(s): Jose Silveira in the last 72 hours.     Assessment/Plan:    Active Hospital Problems    Diagnosis Date Noted    Moderate malnutrition ([address] [E44.0] 02/28/2020

## 2020-03-05 NOTE — PROGRESS NOTES
Patient resting quietly in bed. Patient meets discharge criteria for phase 1. Seen by anesthesia. OK to transfer to floor.

## 2020-03-05 NOTE — PROGRESS NOTES
Office : [phone]     Fax :[phone]       Nephrology Note     Na better  No N/V/D     No acute events overnight   No new complaints today         Past Medical History:   Diagnosis Date    Anxiety     GERD (gastroesophageal reflux disease)     IBS (irritable bowel syndrome)        Past Surgical History:   Procedure Laterality Date    COLONOSCOPY      EXCISION / BIOPSY SKIN LESION OF TRUNK N/A 12/18/2018    EXCISION OF SEBACEOUS CYST ON BACK performed by Gary Cruz MD at [address] N/A 3/4/2020    EGD ESOPHAGOGASTRODUODENOSCOPY ULTRASOUND performed by Lila Harding MD at [address] [facility] ENDOSCOPY N/A 3/4/2020    EGD BIOPSY performed by Lila Harding MD at [address] [facility] ENDOSCOPY       Family History   Problem Relation Age of Onset    Hypertension Father     Cancer Father     Cancer Other         reports that he has been smoking cigarettes. He has a 11.50 pack-year smoking history. He has quit using smokeless tobacco.  His smokeless tobacco use included chew. He reports current alcohol use. He reports that he does not use drugs.     Allergies:  Amoxicillin-pot clavulanate; Citalopram; Midrin [apap-isometheptene-dichloral]; and Penicillins    Current Medications:    pantoprazole (PROTONIX) tablet 40 mg, QAM AC  tiZANidine (ZANAFLEX) tablet 4 mg, TID PRN  promethazine (PHENERGAN) injection 6.25 mg, Q6H PRN  sodium chloride flush 0.9 % injection 10 mL, 2 times per day  sodium chloride flush 0.9 % injection 10 mL, PRN  acetaminophen (TYLENOL) tablet 650 mg, Q6H PRN  acetaminophen (TYLENOL) suppository 650 mg, Q6H PRN  polyethylene glycol (GLYCOLAX) packet 17 g, Daily PRN  promethazine (PHENERGAN) tablet 12.5

## 2020-03-05 NOTE — PROGRESS NOTES
Patient received to PACU in stable condition. VSS. Incisions to abdomin clean dry and intact. Ice applied. Will continue to monitor.

## 2020-03-05 NOTE — CONSULTS
[facility]        PATIENT NAME: Gallo Riley      TODAY'S DATE: 3/4/2020    Reason for Consult:  Abdominal concerns    Requesting Physician:  Dr. Marianela Ludwig:              The patient is a 37 y.o. male who presents with anorexia, weight loss, and abd pain. The pt has had symptoms for the past month at least, intermittent pain, has had extensive GI smiley, and had EUS completed today . He has had associated symptoms of intermittent cramping, and also had prior similar episodes. No prior abdominal surgery. Testing has included CT, EGD, colonoscopy, and EUS.       Past Medical History:        Diagnosis Date    Anxiety     GERD (gastroesophageal reflux disease)     IBS (irritable bowel syndrome)        Past Surgical History:        Procedure Laterality Date    COLONOSCOPY      EXCISION / BIOPSY SKIN LESION OF TRUNK N/A 12/18/2018    EXCISION OF SEBACEOUS CYST ON BACK performed by Alda Hernández MD at [facility] 3/4/2020    EGD ESOPHAGOGASTRODUODENOSCOPY ULTRASOUND performed by Anthony Crespo MD at [facility] N/A 3/4/2020    EGD BIOPSY performed by Anthony Crespo MD at [address] [facility] ENDOSCOPY       Current Medications:   Current Facility-Administered Medications: pantoprazole (PROTONIX) tablet 40 mg, 40 mg, Oral, QAM AC  tiZANidine (ZANAFLEX) tablet 4 mg, 4 mg, Oral, TID PRN  promethazine (PHENERGAN) injection 6.25 mg, 6.25 mg, Intramuscular, Q6H PRN  sodium chloride flush 0.9 % injection 10 mL, 10 mL, Intravenous, 2 times per day  sodium chloride flush 0.9 % injection 10 mL, 10 mL, Intravenous, PRN  acetaminophen (TYLENOL) tablet 650 mg, 650 mg, Oral, Q6H PRN  acetaminophen (TYLENOL) suppository 650 mg, 650 mg, Rectal, Q6H PRN  polyethylene glycol (GLYCOLAX) packet 17 g, 17 g, Oral, Daily PRN  promethazine (PHENERGAN) tablet 12.5 mg, 12.5 mg, Oral, Q6H PRN  ondansetron (ZOFRAN) injection 4 mg, 4 mg, Intravenous, Q6H PRN  Prior to Admission medications    Medication Sig Start Date End Date Taking? Authorizing Provider   tiZANidine (ZANAFLEX) 4 MG tablet Take 1 tablet by mouth 3 times daily as needed (PAIN) 2/19/20  Yes Chele Patton MD   omeprazole (PRILOSEC) 40 MG delayed release capsule TAKE ONE CAPSULE BY MOUTH DAILY 9/6/19  Yes Chele Patton MD        Allergies:  Amoxicillin-pot clavulanate; Citalopram; Midrin [apap-isometheptene-dichloral]; and Penicillins    Social History:    reports that he has been smoking cigarettes. He has a 11.50 pack-year smoking history. He has quit using smokeless tobacco.  His smokeless tobacco use included chew. He reports current alcohol use. He reports that he does not use drugs.     Family History:        Problem Relation Age of Onset    Hypertension Father     Cancer Father     Cancer Other        REVIEW OF SYSTEMS:  CONSTITUTIONAL:  negative  HEENT:  negative  RESPIRATORY:  negative  CARDIOVASCULAR:  negative  GASTROINTESTINAL:  Nausea, weight loss, pain  GENITOURINARY:  negative  HEMATOLOGIC/LYMPHATIC:  negative  NEUROLOGICAL:  negative  SKIN: negative    PHYSICAL EXAM:  VITALS:  BP 98/68   Pulse 107   Temp 97.7 °F (36.5 °C) (Oral)   Resp 18   Ht 5' 11\" (1.803 m)   Wt 159 lb (72.1 kg)   SpO2 98%   BMI 22.18 kg/m²     CONSTITUTIONAL:  alert, no apparent distress and thin  EYES:  sclera clear  ENT:  normocepalic, without obvious abnormality  NECK:  supple, symmetrical, trachea midline  LUNGS:  clear to auscultation  CARDIOVASCULAR:  regular rate and rhythm  ABDOMEN:  no scars, normal bowel sounds, soft, non-distended, non-tender, voluntary guarding absent, no masses palpated, no hepatosplenomegally and hernia absent  MUSCULOSKELETAL:  0+ pitting edema lower extremities  NEUROLOGIC:  Mental Status Exam:  Level of Alertness:   awake  Orientation:   person, place, time  SKIN:  no bruising or bleeding, normal skin

## 2020-03-05 NOTE — ANESTHESIA PRE PROCEDURE
03/05/20 98/66   03/04/20 (!) 85/52   02/20/20 111/69       NPO Status: Time of last liquid consumption: 2300                        Time of last solid consumption: 2200                        Date of last liquid consumption: 03/03/20                        Date of last solid food consumption: 03/03/20    BMI:   Wt Readings from Last 3 Encounters:   03/04/20 159 lb (72.1 kg)   02/20/20 175 lb (79.4 kg)   02/15/20 177 lb (80.3 kg)     Body mass index is 22.18 kg/m². CBC:   Lab Results   Component Value Date    WBC 8.1 03/01/2020    RBC 4.49 03/01/2020    HGB 12.9 03/01/2020    HCT 39.2 03/01/2020    MCV 87.4 03/01/2020    RDW 13.7 03/01/2020     03/01/2020       CMP:   Lab Results   Component Value Date     03/05/2020    K 4.2 03/05/2020    K 4.5 02/28/2020    CL 94 03/05/2020    CO2 25 03/05/2020    BUN 10 03/05/2020    CREATININE 0.6 03/05/2020    GFRAA >60 03/05/2020    AGRATIO 0.5 02/28/2020    LABGLOM >60 03/05/2020    GLUCOSE 88 03/05/2020    PROT 6.9 03/05/2020    CALCIUM 8.6 03/05/2020    BILITOT 0.6 03/05/2020    ALKPHOS 87 03/05/2020    AST 27 03/05/2020    ALT 34 03/05/2020       POC Tests: No results for input(s): POCGLU, POCNA, POCK, POCCL, POCBUN, POCHEMO, POCHCT in the last 72 hours.     Coags: No results found for: PROTIME, INR, APTT    HCG (If Applicable): No results found for: PREGTESTUR, PREGSERUM, HCG, HCGQUANT     ABGs: No results found for: PHART, PO2ART, PIK9YOT, AZM3WZS, BEART, L4WBLBCO     Type & Screen (If Applicable):  No results found for: LABABO, [address]    Anesthesia Evaluation  Patient summary reviewed and Nursing notes reviewed  Airway: Mallampati: I        Dental: normal exam         Pulmonary:normal exam                               Cardiovascular:                      Neuro/Psych:   (+) psychiatric history:            GI/Hepatic/Renal:   (+) GERD:,           Endo/Other:                     Abdominal:           Vascular:                                      Anesthesia Plan      general     ASA 2       Induction: intravenous. MIPS: Postoperative opioids intended, Prophylactic antiemetics administered and Postoperative trial extubation. Anesthetic plan and risks discussed with patient. Use of blood products discussed with patient whom consented to blood products. Plan discussed with CRNA.                   Harmeet Phelan MD   3/5/2020

## 2020-03-05 NOTE — PROGRESS NOTES
negative except for poor appetite  GENITOURINARY:  negative  HEMATOLOGIC/LYMPHATIC:  negative  NEUROLOGICAL:  negative  SKIN: negative      OBJECTIVE:  VITALS:  BP 92/63   Pulse 90   Temp 97.8 °F (36.6 °C) (Oral)   Resp 18   Ht 5' 11\" (1.803 m)   Wt 159 lb (72.1 kg)   SpO2 97%   BMI 22.18 kg/m²     INTAKE/OUTPUT:    I/O last 3 completed shifts: In: 300 [I.V.:300]  Out: -   No intake/output data recorded. CONSTITUTIONAL:  awake and alert  LUNGS:  clear to auscultation  HEART: RRR  ABDOMEN:  normal bowel sounds, soft, non-distended, non-tender         Data:  CBC: No results for input(s): WBC, HGB, HCT, PLT in the last 72 hours. BMP:    Recent Labs     03/03/20  0658 03/05/20  0657    134*   K 4.8 4.2   CL 95* 94*   CO2 27 25   BUN 11 10   CREATININE 0.7* 0.6*   GLUCOSE 95 88     Hepatic:   Recent Labs     03/03/20  0659 03/04/20  0621 03/05/20  0657   AST 38* 27 27   ALT 48* 39 34   BILITOT 0.7 0.7 0.6   ALKPHOS 98 91 87     Mag:    No results for input(s): MG in the last 72 hours. Phos:   No results for input(s): PHOS in the last 72 hours. INR: No results for input(s): INR in the last 72 hours. Radiology Review:  No new studis      ASSESSMENT AND PLAN:  Symptomatic cholelithiasis, microlithiasis, possible chronic cholecystitis  Labs, LFT normal today  NPO, OR planned for today. LC, IOC, DW pt, all Q answered.  Preop ordered, wants to proceed      Ardie Hearing

## 2020-03-06 LAB
ALBUMIN SERPL-MCNC: 2.6 G/DL (ref 3.4–5)
ALP BLD-CCNC: 89 U/L (ref 40–129)
ALT SERPL-CCNC: 34 U/L (ref 10–40)
ANION GAP SERPL CALCULATED.3IONS-SCNC: 13 MMOL/L (ref 3–16)
AST SERPL-CCNC: 36 U/L (ref 15–37)
BILIRUB SERPL-MCNC: 0.6 MG/DL (ref 0–1)
BILIRUBIN DIRECT: <0.2 MG/DL (ref 0–0.3)
BILIRUBIN, INDIRECT: ABNORMAL MG/DL (ref 0–1)
BUN BLDV-MCNC: 11 MG/DL (ref 7–20)
CALCIUM SERPL-MCNC: 8.4 MG/DL (ref 8.3–10.6)
CHLORIDE BLD-SCNC: 97 MMOL/L (ref 99–110)
CO2: 23 MMOL/L (ref 21–32)
CREAT SERPL-MCNC: 0.7 MG/DL (ref 0.9–1.3)
GFR AFRICAN AMERICAN: >60
GFR NON-AFRICAN AMERICAN: >60
GLUCOSE BLD-MCNC: 106 MG/DL (ref 70–99)
POTASSIUM SERPL-SCNC: 5.3 MMOL/L (ref 3.5–5.1)
SODIUM BLD-SCNC: 133 MMOL/L (ref 136–145)
TOTAL PROTEIN: 7.3 G/DL (ref 6.4–8.2)

## 2020-03-06 PROCEDURE — 80048 BASIC METABOLIC PNL TOTAL CA: CPT

## 2020-03-06 PROCEDURE — 2580000003 HC RX 258: Performed by: INTERNAL MEDICINE

## 2020-03-06 PROCEDURE — APPSS15 APP SPLIT SHARED TIME 0-15 MINUTES: Performed by: NURSE PRACTITIONER

## 2020-03-06 PROCEDURE — 94760 N-INVAS EAR/PLS OXIMETRY 1: CPT

## 2020-03-06 PROCEDURE — 6370000000 HC RX 637 (ALT 250 FOR IP): Performed by: INTERNAL MEDICINE

## 2020-03-06 PROCEDURE — 99024 POSTOP FOLLOW-UP VISIT: CPT | Performed by: SURGERY

## 2020-03-06 PROCEDURE — APPNB30 APP NON BILLABLE TIME 0-30 MINS: Performed by: NURSE PRACTITIONER

## 2020-03-06 PROCEDURE — 36415 COLL VENOUS BLD VENIPUNCTURE: CPT

## 2020-03-06 PROCEDURE — 6360000002 HC RX W HCPCS: Performed by: SURGERY

## 2020-03-06 PROCEDURE — 80076 HEPATIC FUNCTION PANEL: CPT

## 2020-03-06 PROCEDURE — 1200000000 HC SEMI PRIVATE

## 2020-03-06 RX ORDER — TRISODIUM CITRATE DIHYDRATE AND CITRIC ACID MONOHYDRATE 500; 334 MG/5ML; MG/5ML
30 SOLUTION ORAL ONCE
Status: DISCONTINUED | OUTPATIENT
Start: 2020-03-06 | End: 2020-03-07 | Stop reason: HOSPADM

## 2020-03-06 RX ADMIN — Medication 10 ML: at 08:05

## 2020-03-06 RX ADMIN — MORPHINE SULFATE 2 MG: 2 INJECTION, SOLUTION INTRAMUSCULAR; INTRAVENOUS at 04:31

## 2020-03-06 RX ADMIN — PANTOPRAZOLE SODIUM 40 MG: 40 TABLET, DELAYED RELEASE ORAL at 08:04

## 2020-03-06 RX ADMIN — Medication 10 ML: at 19:44

## 2020-03-06 RX ADMIN — Medication 10 ML: at 04:31

## 2020-03-06 ASSESSMENT — PAIN DESCRIPTION - LOCATION
LOCATION: ABDOMEN
LOCATION: ABDOMEN

## 2020-03-06 ASSESSMENT — PAIN SCALES - GENERAL
PAINLEVEL_OUTOF10: 2
PAINLEVEL_OUTOF10: 2
PAINLEVEL_OUTOF10: 4
PAINLEVEL_OUTOF10: 2

## 2020-03-06 ASSESSMENT — PAIN DESCRIPTION - PAIN TYPE
TYPE: ACUTE PAIN
TYPE: ACUTE PAIN

## 2020-03-06 ASSESSMENT — PAIN DESCRIPTION - DESCRIPTORS
DESCRIPTORS: DULL
DESCRIPTORS: DULL
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
## 2020-03-06 NOTE — PROGRESS NOTES
Pt ambulated on unit urine output x1 pt denies any dysuria. Fluids encouraged. 5 surgical sites CDI open to air. IS given and education provided. Pt refused breakfast but stated he will eat lunch. Pt dad at bed resting call light in reach.  Pain is 2 on scale

## 2020-03-06 NOTE — PLAN OF CARE
Nutrition Problem: Inadequate oral intake  Intervention: Food and/or Nutrient Delivery: Modify current ONS, Continue current diet(encouraged lowfat choices)  Nutritional Goals: Pt will consume at least 50% of meals and supplements

## 2020-03-06 NOTE — CARE COORDINATION
Discharge Planning:    Patient admitted with an anticipated short hospitalization length of stay. Chart reviewed and it appears that patient has minimal needs for discharge at this time. Discussed with patients nurse and requested that case management be notified if discharge needs are identified. *Case management will continue to follow progress and update discharge plan as needed.     Electronically signed by DAVIS Connors on 3/6/2020 at 9:11 AM

## 2020-03-06 NOTE — PROGRESS NOTES
[facility] GI  Gastroenterology Progress Note  Beatrice Morgan is a 37 y.o. male patient. 1. Hyponatremia    2. Symptomatic cholelithiasis        SUBJECTIVE:  Some incisional pain. No appetite. Didn't order breakfast. Waiting for lunch. Physical    VITALS:  /76   Pulse 75   Temp 97.9 °F (36.6 °C) (Oral)   Resp 16   Ht 5' 11\" (1.803 m)   Wt 159 lb (72.1 kg)   SpO2 96%   BMI 22.18 kg/m²   TEMPERATURE:  Current - Temp: 97.9 °F (36.6 °C); Max - Temp  Av.8 °F (35.4 °C)  Min: 89.2 °F (31.8 °C)  Max: 97.9 °F (36.6 °C)    Abdomen soft, ND, incisional tenderness, no HSM, Bowel sounds normal     Data      No results for input(s): WBC, HGB, HCT, MCV, PLT in the last 72 hours. Recent Labs     20  0657 20  0608   * 133*   K 4.2 5.3*   CL 94* 97*   CO2 25 23   BUN 10 11   CREATININE 0.6* 0.7*     Recent Labs     20  0621 20  0657 20  0608   AST 27 27 36   ALT 39 34 34   BILIDIR <0.2 <0.2 <0.2   BILITOT 0.7 0.6 0.6   ALKPHOS 91 87 89     No results for input(s): LIPASE, AMYLASE in the last 72 hours.          ASSESSMENT :     Anorexia - main complaint is lack of appetite. some nausea with attempting to eat. Prior EGD and colonoscopy for similar sx in 2017 was negative. He is tolerating clear liquids with improvement in serum Na. EGD 3/4 normal. EUS showed microlithiasis. Symptoms likely from GB disease.      Elevated transaminases/Abnormal liver on CT abd/pelvis - - mild elevation of AST>ALT which have since normalized. Also noted to have periportal edema on CT and hypodensity in left hepatic lobe, could have been focal fat, but MRI Liver shows irregular left hepatic duct system. No mass. EUS with microlithiasis but otherwise normal/no liver mass. There is mild GB wall thickening and pericholecystic fluid. Amylase and lipase are elevated. I am more inclined to believe his picture actually reflects an atypical presentation of gallbladder disease/dysfunction. Ca19-9 WNL.

## 2020-03-06 NOTE — OP NOTE
grasper at the fundus and then the infundibulum area was  grasped and retracted out inferolaterally to the right. The critical  angle view technique was used to visualize cystic artery, cyst duct and  node of Calot. The cystic duct and gallbladder junction was clipped and  the cyst duct was partially transected. The intraoperative  cholangiogram catheter was then passed through its Angiocath in the  intra-abdominal wall and cholangiogram was obtained. This appeared  normal.  The cholangiogram catheter was removed and the cystic duct was  clipped with three clips proximally and transection of the duct was  completed. The patient's cystic artery was then clipped with two clips  proximally and one clip distally and transected. Bovie electrocautery  was used to remove the gallbladder from the hepatic fossa. A couple of  additional small vessels were clipped along the way. The gallbladder  clips were then removed. The gallbladder was suction aspirated and then  extracted through the epigastric 5 mm port site. The perihepatic area  was then irrigated and aspirated dry. The gallbladder bed appeared  hemostatic. The clips were in good location with no bleeding or bile  leak noted. The instruments and ports were removed. The port sites  appeared hemostatic. The fascia at the epigastric 5-mm site was closed  with a single 0-Vicryl suture. Skin at all sites were closed with 4-0  Monocryl suture. Dermabond glue was placed. The patient was taken to  recovery room in stable condition postop.         Lauren Landa MD    D: 03/05/2020 16:37:13       T: 03/05/2020 16:50:22     [email or web address]  Job#: [id number]     Doc#: [id number]    CC:  MD Cherelle Kingsley MD

## 2020-03-06 NOTE — PROGRESS NOTES
Office : [phone]     Fax :[phone]       Nephrology Note     Sodium level dropped to 133  No N/V/D   Poor oral intake   No acute events overnight   No new complaints today         Past Medical History:   Diagnosis Date    Anxiety     GERD (gastroesophageal reflux disease)     IBS (irritable bowel syndrome)        Past Surgical History:   Procedure Laterality Date    CHOLECYSTECTOMY, LAPAROSCOPIC N/A 3/5/2020    CHOLECYSTECTOMY LAPAROSCOPIC WITH CHOLANGIOGRAM performed by Lexi Wild MD at [facility]. / BIOPSY SKIN LESION OF TRUNK N/A 12/18/2018    EXCISION OF SEBACEOUS CYST ON BACK performed by Jesus Haddad MD at [address] [facility] N/A 3/4/2020    EGD ESOPHAGOGASTRODUODENOSCOPY ULTRASOUND performed by Shruti Freed MD at [address] [facility] ENDOSCOPY N/A 3/4/2020    EGD BIOPSY performed by Shruti Freed MD at [address] [facility] ENDOSCOPY       Family History   Problem Relation Age of Onset    Hypertension Father     Cancer Father     Cancer Other         reports that he has been smoking cigarettes. He has a 11.50 pack-year smoking history. He has quit using smokeless tobacco.  His smokeless tobacco use included chew. He reports current alcohol use. He reports that he does not use drugs.     Allergies:  Amoxicillin-pot clavulanate; Citalopram; Midrin [apap-isometheptene-dichloral]; and Penicillins    Current Medications:    citric acid-sodium citrate (BICITRA) solution 30 mL, Once  morphine (PF) injection 2 mg, Q2H PRN    Or  morphine injection 4 mg, Q2H PRN  oxyCODONE-acetaminophen (PERCOCET) 5-325 MG per tablet 1 tablet, Q6H PRN  ketorolac (TORADOL) injection 15 mg, Q6H PRN  pantoprazole (PROTONIX) tablet 40 mg, QAM AC  tiZANidine (ZANAFLEX) tablet 4 mg, TID PRN  promethazine (PHENERGAN) injection 6.25 mg, Q6H PRN  sodium chloride flush 0.9 % injection 10 mL, 2 times per day  sodium chloride flush 0.9 % injection 10 mL, PRN  acetaminophen (TYLENOL) tablet 650 mg, Q6H PRN  acetaminophen (TYLENOL) suppository 650 mg, Q6H PRN  polyethylene glycol (GLYCOLAX) packet 17 g, Daily PRN  promethazine (PHENERGAN) tablet 12.5 mg, Q6H PRN  ondansetron (ZOFRAN) injection 4 mg, Q6H PRN              Physical exam:     Vitals:  /76   Pulse 75   Temp 97.9 °F (36.6 °C) (Oral)   Resp 16   Ht 5' 11\" (1.803 m)   Wt 159 lb (72.1 kg)   SpO2 98%   BMI 22.18 kg/m²   Constitutional:  OAA X3 NAD  Skin: no rash, turgor wnl  Heent:  eomi, mmm  Neck: no bruits or jvd noted  Cardiovascular:  S1, S2 without m/r/g  Respiratory: CTA B without w/r/r  Abdomen:  +bs, soft, nt, nd  Ext: no  lower extremity edema  Psychiatric: mood and affect appropriate  Musculoskeletal:  Rom, muscular strength intact    Labs:  CBC:   No results for input(s): WBC, HGB, PLT in the last 72 hours. BMP:    Recent Labs     03/05/20  0657 03/06/20  0608   * 133*   K 4.2 5.3*   CL 94* 97*   CO2 25 23   BUN 10 11   CREATININE 0.6* 0.7*   GLUCOSE 88 106*     Ca/Mg/Phos:   Recent Labs     03/05/20  0657 03/06/20  0608   CALCIUM 8.6 8.4     Hepatic:   Recent Labs     03/04/20  0621 03/05/20  0657 03/06/20  0608   AST 27 27 36   ALT 39 34 34   BILITOT 0.7 0.6 0.6   ALKPHOS 91 87 89     Troponin:   No results for input(s): TROPONINI in the last 72 hours. BNP: No results for input(s): BNP in the last 72 hours. Lipids: No results for input(s): CHOL, TRIG, HDL, LDLCALC, LABVLDL in the last 72 hours. ABGs: No results for input(s): PHART, PO2ART, CMM9WPW in the last 72 hours. INR: No results for input(s): INR in the last 72 hours.   UA:No results for input(s): Annamarie Cowden, [address], [address] [place] [address] [address], [facility], with a dedicated hepatic MRI recommended. Periportal edema. That is a nonspecific finding, and can be secondary to   localized disease (such as hepatitis), but can also be secondary to systemic   etiologies (such as third-spacing). CT CHEST W CONTRAST   Final Result   Chest CT: No acute or suspicious abnormality identified. Abdomen and pelvis CT: Heterogeneous hypodensity within the left hepatic lobe   anteriorly and inferiorly. That may be secondary to heterogeneous fatty   infiltration versus focal inflammation. Neoplasm does remain a concern. Further evaluation with a dedicated hepatic MRI recommended. Periportal edema. That is a nonspecific finding, and can be secondary to   localized disease (such as hepatitis), but can also be secondary to systemic   etiologies (such as third-spacing). XR CHEST PORTABLE   Final Result   Unremarkable chest.           I/O last 3 completed shifts: In: 2592 [P.O.:240; I.V.:1600]  Out: 125 [Urine:125]      Assessment/Plan :      1. Hyponatremia. Sodium level 123----> 128 -----> 130--->132--->136 ---> 135 ---> 136 ---. 134 ---> 133  Refused sodium supplement   Poor oral intake   Only on clear for now   Urine lytes reviewed - SIADH     2. H/o GERD, duodenitis, elevated LFT   PPI   MRI Liver shows irregular left hepatic duct system. GI following     3. Gallbladder microlithiasis and chronic cholecystitis.    S/p lap cholecystectomy                Thank you for allowing us to participate in care of Gallo Riley         Electronically signed by: Patty Vieyra MD, 3/6/2020, 9:32 AM      [facility] [address]  Office : [phone]  Fax :[phone]

## 2020-03-07 VITALS
WEIGHT: 153.9 LBS | DIASTOLIC BLOOD PRESSURE: 70 MMHG | BODY MASS INDEX: 21.55 KG/M2 | HEIGHT: 71 IN | TEMPERATURE: 98.7 F | OXYGEN SATURATION: 98 % | RESPIRATION RATE: 16 BRPM | SYSTOLIC BLOOD PRESSURE: 103 MMHG | HEART RATE: 85 BPM

## 2020-03-07 LAB
ALBUMIN SERPL-MCNC: 2.6 G/DL (ref 3.4–5)
ALP BLD-CCNC: 83 U/L (ref 40–129)
ALT SERPL-CCNC: 36 U/L (ref 10–40)
ANION GAP SERPL CALCULATED.3IONS-SCNC: 13 MMOL/L (ref 3–16)
AST SERPL-CCNC: 36 U/L (ref 15–37)
BILIRUB SERPL-MCNC: 0.5 MG/DL (ref 0–1)
BILIRUBIN DIRECT: <0.2 MG/DL (ref 0–0.3)
BILIRUBIN, INDIRECT: ABNORMAL MG/DL (ref 0–1)
BUN BLDV-MCNC: 8 MG/DL (ref 7–20)
CALCIUM SERPL-MCNC: 8.5 MG/DL (ref 8.3–10.6)
CHLORIDE BLD-SCNC: 97 MMOL/L (ref 99–110)
CO2: 26 MMOL/L (ref 21–32)
CREAT SERPL-MCNC: 0.6 MG/DL (ref 0.9–1.3)
GFR AFRICAN AMERICAN: >60
GFR NON-AFRICAN AMERICAN: >60
GLUCOSE BLD-MCNC: 102 MG/DL (ref 70–99)
POTASSIUM SERPL-SCNC: 4.4 MMOL/L (ref 3.5–5.1)
SODIUM BLD-SCNC: 136 MMOL/L (ref 136–145)
TOTAL PROTEIN: 6.5 G/DL (ref 6.4–8.2)

## 2020-03-07 PROCEDURE — 6370000000 HC RX 637 (ALT 250 FOR IP): Performed by: INTERNAL MEDICINE

## 2020-03-07 PROCEDURE — 36415 COLL VENOUS BLD VENIPUNCTURE: CPT

## 2020-03-07 PROCEDURE — 80076 HEPATIC FUNCTION PANEL: CPT

## 2020-03-07 PROCEDURE — 94760 N-INVAS EAR/PLS OXIMETRY 1: CPT

## 2020-03-07 PROCEDURE — 99024 POSTOP FOLLOW-UP VISIT: CPT | Performed by: SURGERY

## 2020-03-07 PROCEDURE — 80048 BASIC METABOLIC PNL TOTAL CA: CPT

## 2020-03-07 RX ADMIN — PANTOPRAZOLE SODIUM 40 MG: 40 TABLET, DELAYED RELEASE ORAL at 04:31

## 2020-03-07 ASSESSMENT — PAIN SCALES - GENERAL
PAINLEVEL_OUTOF10: 0
PAINLEVEL_OUTOF10: 2

## 2020-03-07 ASSESSMENT — PAIN DESCRIPTION - PAIN TYPE: TYPE: ACUTE PAIN

## 2020-03-07 ASSESSMENT — PAIN DESCRIPTION - LOCATION: LOCATION: ABDOMEN

## 2020-03-07 NOTE — DISCHARGE SUMMARY
CONSULT TO GI  IP CONSULT TO NEPHROLOGY  IP CONSULT TO SPIRITUAL SERVICES  IP CONSULT TO GENERAL SURGERY    Disposition:  home    Condition:  Stable    Discharge Instructions/Follow-up:   Pt to follow up with PCP within 1 week  Consultants as scheduled    Code Status:  Full Code    Activity: activity as tolerated    Diet: regular diet    Labs: For convenience and continuity at follow-up the following most recent labs are provided:      CBC:    Lab Results   Component Value Date    WBC 8.1 03/01/2020    HGB 12.9 03/01/2020    HCT 39.2 03/01/2020     03/01/2020       Renal:    Lab Results   Component Value Date     03/07/2020    K 4.4 03/07/2020    K 4.5 02/28/2020    CL 97 03/07/2020    CO2 26 03/07/2020    BUN 8 03/07/2020    CREATININE 0.6 03/07/2020    CALCIUM 8.5 03/07/2020       Discharge Medications:     Current Discharge Medication List           Details   HYDROcodone-acetaminophen (NORCO) 5-325 MG per tablet Take 1 tablet by mouth every 4 hours as needed for Pain for up to 7 days. Qty: 25 tablet, Refills: 0    Comments: Reduce doses taken as pain becomes manageable  Associated Diagnoses: Symptomatic cholelithiasis              Details   tiZANidine (ZANAFLEX) 4 MG tablet Take 1 tablet by mouth 3 times daily as needed (PAIN)  Qty: 30 tablet, Refills: 1      omeprazole (PRILOSEC) 40 MG delayed release capsule TAKE ONE CAPSULE BY MOUTH DAILY  Qty: 30 capsule, Refills: 11             Time Spent on discharge is more than 45 minutes in the examination, evaluation, counseling and review of medications and discharge plan. Signed:    Mary Au MD   3/7/2020    Thank you Victor Manuel Cowan MD for the opportunity to be involved in this patient's care.
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
## 2020-03-07 NOTE — PROGRESS NOTES
PRN  pantoprazole (PROTONIX) tablet 40 mg, QAM AC  tiZANidine (ZANAFLEX) tablet 4 mg, TID PRN  promethazine (PHENERGAN) injection 6.25 mg, Q6H PRN  sodium chloride flush 0.9 % injection 10 mL, 2 times per day  sodium chloride flush 0.9 % injection 10 mL, PRN  acetaminophen (TYLENOL) tablet 650 mg, Q6H PRN  acetaminophen (TYLENOL) suppository 650 mg, Q6H PRN  polyethylene glycol (GLYCOLAX) packet 17 g, Daily PRN  promethazine (PHENERGAN) tablet 12.5 mg, Q6H PRN  ondansetron (ZOFRAN) injection 4 mg, Q6H PRN              Physical exam:     Vitals:  /70   Pulse 85   Temp 98.7 °F (37.1 °C) (Oral)   Resp 16   Ht 5' 11\" (1.803 m)   Wt 153 lb 14.4 oz (69.8 kg)   SpO2 98%   BMI 21.46 kg/m²   Constitutional:  OAA X3 NAD  Skin: no rash, turgor wnl  Heent:  eomi, mmm  Neck: no bruits or jvd noted  Cardiovascular:  S1, S2 without m/r/g  Respiratory: CTA B without w/r/r  Abdomen:  +bs, soft, nt, nd  Ext: no  lower extremity edema  Psychiatric: mood and affect appropriate  Musculoskeletal:  Rom, muscular strength intact    Labs:  CBC:   No results for input(s): WBC, HGB, PLT in the last 72 hours. BMP:    Recent Labs     03/05/20 0657 03/06/20  0608 03/07/20  0625   * 133* 136   K 4.2 5.3* 4.4   CL 94* 97* 97*   CO2 25 23 26   BUN 10 11 8   CREATININE 0.6* 0.7* 0.6*   GLUCOSE 88 106* 102*     Ca/Mg/Phos:   Recent Labs     03/05/20 0657 03/06/20  0608 03/07/20  0625   CALCIUM 8.6 8.4 8.5     Hepatic:   Recent Labs     03/05/20 0657 03/06/20  0608 03/07/20  0625   AST 27 36 36   ALT 34 34 36   BILITOT 0.6 0.6 0.5   ALKPHOS 87 89 83     Troponin:   No results for input(s): TROPONINI in the last 72 hours. BNP: No results for input(s): BNP in the last 72 hours. Lipids: No results for input(s): CHOL, TRIG, HDL, LDLCALC, LABVLDL in the last 72 hours. ABGs: No results for input(s): PHART, PO2ART, UAC7COE in the last 72 hours. INR: No results for input(s): INR in the last 72 hours.   UA:No results for input(s): Agueda Friday, BLOODU, PHUR, PROTEINU, UROBILINOGEN, NITRU, LEUKOCYTESUR, Zee Knows in the last 72 hours. Urine Microscopic: No results for input(s): LABCAST, BACTERIA, COMU, HYALCAST, WBCUA, RBCUA, EPIU in the last 72 hours. Urine Culture: No results for input(s): LABURIN in the last 72 hours. Urine Chemistry:   No results for input(s): Sharlynn Profit, PROTEINUR, NAUR in the last 72 hours. IMAGING:  FL CHOLANGIOGRAM OR   Final Result   No filling defect within the common bile duct. No evidence of obstruction. Please see intraoperative report for further details. MRI ABDOMEN W WO CONTRAST   Final Result   1. Mild periportal edema left hepatic lobe possibly related to hepatitis, but   this can be seen with segmental atrophy of the liver (correlate with remote   history of trauma in this region). 2. Nonspecific mild central left hepatic bile duct dilatation. Suboptimally   evaluated without MRCP series. ERCP or MRCP correlation may be useful for   additional characterization. I suspect this to reflect segmental atrophy of   the liver, possibly remote posttraumatic. Focal cholangitis is a   consideration. The patient's age as well as absence of underlying   enhancement abnormality makes the consideration of malignant etiology (such   as cholangiocarcinoma) very unlikely. 3. Nonspecific gerri hepatis and portacaval lymph node enlargement. Involvement with malignancy is a consideration though these may simply be   reactive. 4. No cholelithiasis is evident, however findings are seen which can reflect   cholecystitis. CT ABDOMEN PELVIS W IV CONTRAST Additional Contrast? Oral   Final Result   Chest CT: No acute or suspicious abnormality identified. Abdomen and pelvis CT: Heterogeneous hypodensity within the left hepatic lobe   anteriorly and inferiorly.   That may be secondary to heterogeneous fatty   infiltration versus

## 2020-03-09 ENCOUNTER — TELEPHONE (OUTPATIENT)
Dept: FAMILY MEDICINE CLINIC | Age: 35
End: 2020-03-09

## 2020-03-10 NOTE — TELEPHONE ENCOUNTER
Besides having a reaction to the medication  and anixety he is also having sob. Please advise father Eli Bautista.

## 2020-03-10 NOTE — TELEPHONE ENCOUNTER
Pt's father called wanting to know if his omeprazole can be decrease to 20 mg as this was originally done by [facility] at 20 mg and few years ago he increase to 40 mg. Father states he has been doing this weird belching every since the increase but last night it was really bad all the gas build up and is given him anxiety issues as this is making feel sob. Pt just had his gallbladder taking out. Pt's father stated you saw his son back in 2018.  Please advise

## 2020-03-11 RX ORDER — BUSPIRONE HYDROCHLORIDE 10 MG/1
10 TABLET ORAL 3 TIMES DAILY PRN
Qty: 30 TABLET | Refills: 0 | Status: SHIPPED
Start: 2020-03-11 | End: 2020-04-07

## 2020-03-11 NOTE — TELEPHONE ENCOUNTER
Pt's father advise. He had another question as pt has not had a BM since the day before surgery. Pt was advise to take Miralax but it is not recommended for pt with IBS and label advise to consult with provider before taking. Is this safe or is there something else he can take?   Please advise

## 2020-03-11 NOTE — TELEPHONE ENCOUNTER
Could prescribe BuSpar 10 mg 3 times daily as needed #30 and he should make an appoint with Dr. Tamera Charles for follow-up and further discussion

## 2020-03-12 ENCOUNTER — OFFICE VISIT (OUTPATIENT)
Dept: FAMILY MEDICINE CLINIC | Age: 35
End: 2020-03-12
Payer: COMMERCIAL

## 2020-03-12 VITALS
SYSTOLIC BLOOD PRESSURE: 118 MMHG | HEART RATE: 113 BPM | BODY MASS INDEX: 20.92 KG/M2 | WEIGHT: 150 LBS | OXYGEN SATURATION: 95 % | DIASTOLIC BLOOD PRESSURE: 82 MMHG

## 2020-03-12 PROCEDURE — 99214 OFFICE O/P EST MOD 30 MIN: CPT | Performed by: FAMILY MEDICINE

## 2020-03-12 NOTE — TELEPHONE ENCOUNTER
Patient was just seen and states  sertraline (ZOLOFT) 50 MG tablet was sent to the wrong pharmacy.     He needs the medication sent to:    [facility] [address], [address] [phone] Nasreen Whitney [phone]      Please advise

## 2020-03-17 ENCOUNTER — TELEPHONE (OUTPATIENT)
Dept: SURGERY | Age: 35
End: 2020-03-17

## 2020-03-17 NOTE — TELEPHONE ENCOUNTER
2 week post op call - 3/5/20 Laparoscopic cholecystectomy with intraoperative cholangiogram    LM for pt to call back to discuss how he is feeling.

## 2020-04-07 ENCOUNTER — VIRTUAL VISIT (OUTPATIENT)
Dept: FAMILY MEDICINE CLINIC | Age: 35
End: 2020-04-07
Payer: COMMERCIAL

## 2020-04-07 VITALS — WEIGHT: 149 LBS | BODY MASS INDEX: 20.78 KG/M2

## 2020-04-07 PROCEDURE — 99213 OFFICE O/P EST LOW 20 MIN: CPT | Performed by: FAMILY MEDICINE

## 2020-04-07 NOTE — LETTER
[facility] [place] [address]  Phone: [phone]  Fax: [phone]    Malik Little MD        April 7, 2020     Patient: Starr Vargas   YOB: 1985   Date of Visit: 4/7/2020       To Whom it May Concern: Joelle Weller was seen in my clinic on 4/7/2020. He may return to work light duty (no lifting >5 lbs) on 4/18/2020 and may return to work full duty no restrictions on 5/2/2020. If you have any questions or concerns, please don't hesitate to call.     Sincerely,         Malik Little MD

## 2020-04-13 ENCOUNTER — TELEPHONE (OUTPATIENT)
Dept: FAMILY MEDICINE CLINIC | Age: 35
End: 2020-04-13

## 2020-04-13 NOTE — TELEPHONE ENCOUNTER
Pt. Needs his return to work letter emailed to him asap please advise E-mail - [email or web address]

## 2020-05-08 ENCOUNTER — TELEPHONE (OUTPATIENT)
Dept: FAMILY MEDICINE CLINIC | Age: 35
End: 2020-05-08

## 2020-05-08 NOTE — TELEPHONE ENCOUNTER
Patient express he needs a\" back to duty \" form for his employer stating he is able to work after his surgery he had on 3/5. The patient express you can have the letter/form fax to [phone] to the attention of Jean-Claude Stubbs. Patient is requesting a callback once it is fax.

## 2020-05-11 NOTE — TELEPHONE ENCOUNTER
Patient states he is needing specific notes stating he can return to work \"full duty\" without restrictions.

## 2020-12-21 ENCOUNTER — TELEPHONE (OUTPATIENT)
Dept: FAMILY MEDICINE CLINIC | Age: 35
End: 2020-12-21

## 2020-12-21 NOTE — TELEPHONE ENCOUNTER
[address]  [phone]      sertraline (ZOLOFT) 50 MG tablet [[id number]      He would like to know if you could prescribe enough until he come in      Patient has an appt scheduled for 1/15/21

## 2021-01-15 ENCOUNTER — OFFICE VISIT (OUTPATIENT)
Dept: FAMILY MEDICINE CLINIC | Age: 36
End: 2021-01-15
Payer: COMMERCIAL

## 2021-01-15 VITALS
OXYGEN SATURATION: 98 % | BODY MASS INDEX: 26.69 KG/M2 | TEMPERATURE: 98.9 F | HEART RATE: 75 BPM | SYSTOLIC BLOOD PRESSURE: 130 MMHG | DIASTOLIC BLOOD PRESSURE: 80 MMHG | WEIGHT: 191.4 LBS

## 2021-01-15 DIAGNOSIS — M67.432 GANGLION OF LEFT WRIST: ICD-10-CM

## 2021-01-15 DIAGNOSIS — L72.3 SEBACEOUS CYST: ICD-10-CM

## 2021-01-15 DIAGNOSIS — F41.9 ANXIETY: Primary | ICD-10-CM

## 2021-01-15 DIAGNOSIS — K21.9 GASTROESOPHAGEAL REFLUX DISEASE WITHOUT ESOPHAGITIS: ICD-10-CM

## 2021-01-15 PROCEDURE — 99214 OFFICE O/P EST MOD 30 MIN: CPT | Performed by: FAMILY MEDICINE

## 2021-01-15 RX ORDER — FAMOTIDINE 20 MG/1
20 TABLET, FILM COATED ORAL 2 TIMES DAILY
Qty: 60 TABLET | Refills: 5 | Status: SHIPPED | OUTPATIENT
Start: 2021-01-15 | End: 2021-10-12

## 2021-01-15 ASSESSMENT — PATIENT HEALTH QUESTIONNAIRE - PHQ9
SUM OF ALL RESPONSES TO PHQ QUESTIONS 1-9: 2
SUM OF ALL RESPONSES TO PHQ QUESTIONS 1-9: 2
SUM OF ALL RESPONSES TO PHQ9 QUESTIONS 1 & 2: 2
SUM OF ALL RESPONSES TO PHQ QUESTIONS 1-9: 2
1. LITTLE INTEREST OR PLEASURE IN DOING THINGS: 2

## 2021-05-12 NOTE — PROGRESS NOTES
Patient here for follow up on heartburn. He had stopped medication (omeprazole) in March thinking that he didn't need it anymore, but since stopping the medication, he has had heartburn everyday since summer. Not taking anything for sx. No nausea or vomiting. Has had some epigastric pain off and on as well. Felt like a pressure. States stopped eating lunch meat and hasn't had sx x several months. Heartburn comes and goes thru out the day. States has been taking the sertraline regularly, was out for a few days around [place] and could tell increase in anxiety. As long as taking daily anxiety is well controlled. No SE with meds. Vitals:    01/15/21 1007   BP: 130/80   Site: Left Upper Arm   Position: Sitting   Cuff Size: Medium Adult   Pulse: 75   Temp: 98.9 °F (37.2 °C)   TempSrc: Infrared   SpO2: 98%   Weight: 191 lb 6.4 oz (86.8 kg)     Wt Readings from Last 3 Encounters:   01/15/21 191 lb 6.4 oz (86.8 kg)   04/07/20 149 lb (67.6 kg)   03/12/20 150 lb (68 kg)     Body mass index is 26.69 kg/m². PHQ Scores 1/15/2021 10/10/2017   PHQ2 Score 2 2   PHQ9 Score 2 2           GEN: Alert and oriented x 4 NAD, affect appropriate and overweight, well hydrated, well developed. Dorsum left wrist with 1 cm rubbery nodule c/w ganglion   Left upper back with 2cm subQ freely movable nodule       ASSESSMENT AND PLAN:       Melissa Villarreal was seen today for gastroesophageal reflux and anxiety. Diagnoses and all orders for this visit:    Anxiety  -Stable, continue current medications. Gastroesophageal reflux disease without esophagitis  Trial pepcid  If not working let us know and will call in omeprazole    Ganglion of left wrist  Discussed benign, nothing to do unless having pain and then will need to see hand    Sebaceous cyst  See gen surg if would like removed    Other orders  -     sertraline (ZOLOFT) 50 MG tablet;  Take 1 tablet by mouth daily no

## 2021-05-24 NOTE — PROGRESS NOTES
Shift assessment complete. VSS. Scheduled medications given. Patient resting in bed at this time and denies any needs. Instructed patient to call if needs arise. Call light in reach. declines

## 2021-10-12 ENCOUNTER — NURSE TRIAGE (OUTPATIENT)
Dept: OTHER | Facility: CLINIC | Age: 36
End: 2021-10-12

## 2021-10-12 ENCOUNTER — OFFICE VISIT (OUTPATIENT)
Dept: FAMILY MEDICINE CLINIC | Age: 36
End: 2021-10-12

## 2021-10-12 VITALS
HEART RATE: 119 BPM | SYSTOLIC BLOOD PRESSURE: 110 MMHG | DIASTOLIC BLOOD PRESSURE: 80 MMHG | TEMPERATURE: 99.3 F | BODY MASS INDEX: 28.82 KG/M2 | WEIGHT: 194.6 LBS | OXYGEN SATURATION: 98 % | HEIGHT: 69 IN

## 2021-10-12 DIAGNOSIS — F41.9 ANXIETY: ICD-10-CM

## 2021-10-12 DIAGNOSIS — K58.9 IRRITABLE BOWEL SYNDROME, UNSPECIFIED TYPE: Primary | ICD-10-CM

## 2021-10-12 PROCEDURE — 99214 OFFICE O/P EST MOD 30 MIN: CPT | Performed by: FAMILY MEDICINE

## 2021-10-12 RX ORDER — OMEPRAZOLE 40 MG/1
40 CAPSULE, DELAYED RELEASE ORAL
Qty: 30 CAPSULE | Refills: 1 | Status: SHIPPED | OUTPATIENT
Start: 2021-10-12 | End: 2022-02-01

## 2021-10-12 NOTE — PROGRESS NOTES
Patient is here today due to SOB and abdominal discomfort. No pain, just feels bloated and soreness abdominal area. Symptoms been going on for 2 days. No constipation or diarrhea. No Urine symptoms. Feeling of bloating. Constant feeling, worse after eating. States similar to previous sx. States started after eating chipolte 3 weeks ago. States also started drinking more beer in evenings 5-6 each night. States got some news that upset him. States has been falling asleep on cough and Friday woke at 3 AM after eating too much and drinking beer and noted some sob but got better. Had same thing Sunday night. States waking up feeling like needing a deep breath and then anxiety would kick in. Some sob during day as well. Sob worse after eating. States hasn't really had a lot of pain, more fullness and soreness -feels raw. Stopped the sertraline in April as insurance ran out. Vitals:    10/12/21 1419   BP: 110/80   Site: Left Upper Arm   Position: Sitting   Cuff Size: Medium Adult   Pulse: 119   Temp: 99.3 °F (37.4 °C)   TempSrc: Infrared   SpO2: 98%   Weight: 194 lb 9.6 oz (88.3 kg)   Height: 5' 9\" (1.753 m)     Wt Readings from Last 3 Encounters:   10/12/21 194 lb 9.6 oz (88.3 kg)   01/15/21 191 lb 6.4 oz (86.8 kg)   04/07/20 149 lb (67.6 kg)     Body mass index is 28.74 kg/m². PHQ Scores 1/15/2021 10/10/2017   PHQ2 Score 2 2   PHQ9 Score 2 2           GEN: Alert and oriented x 4 NAD, affect appropriate and overweight, well hydrated, well developed. ASSESSMENT AND PLAN:       Garret Cronin was seen today for shortness of breath and bloated. Diagnoses and all orders for this visit:    Irritable bowel syndrome, unspecified type  -     omeprazole (PRILOSEC) 40 MG delayed release capsule;  Take 1 capsule by mouth every morning (before breakfast)  Resume prilosec  [place] diet  Stop alcohol  Recheck 1 month, if not better needs EGD    Anxiety  Restart sertraline  Recheck 1month    Other orders  - sertraline (ZOLOFT) 50 MG tablet;  Take 1 tablet by mouth daily              Portions of Note per  Rand Hampton CMA AAMA with corrections and edits per Drinda Fothergill, MD.  I agree with entirety of note and was present and performed history and physical.  I also confirm that the note above accurately reflects all work, treatment, procedures, and medical decision making performed by me, Drinda Fothergill, MD

## 2021-10-12 NOTE — TELEPHONE ENCOUNTER
Reason for Disposition   MILD pain that comes and goes (cramps) lasts > 24 hours    Answer Assessment - Initial Assessment Questions  1. LOCATION: \"Where does it hurt? \"       Bloating after eating- not abnormal per patient    2. RADIATION: \"Does the pain shoot anywhere else? \" (e.g., chest, back)     N/A    3. ONSET: \"When did the pain begin? \" (Minutes, hours or days ago)       Jean morning    4. SUDDEN: \"Gradual or sudden onset? \"      Gradual    5. PATTERN \"Does the pain come and go, or is it constant? \"     - If constant: \"Is it getting better, staying the same, or worsening? \"       (Note: Constant means the pain never goes away completely; most serious pain is constant and it progresses)      - If intermittent: \"How long does it last?\" \"Do you have pain now? \"      (Note: Intermittent means the pain goes away completely between bouts)      Intermittent- happens after eating. 6. SEVERITY: \"How bad is the pain? \"  (e.g., Scale 1-10; mild, moderate, or severe)     - MILD (1-3): doesn't interfere with normal activities, abdomen soft and not tender to touch      - MODERATE (4-7): interferes with normal activities or awakens from sleep, tender to touch      - SEVERE (8-10): excruciating pain, doubled over, unable to do any normal activities        Moderate    7. RECURRENT SYMPTOM: \"Have you ever had this type of abdominal pain before? \" If so, ask: \"When was the last time? \" and \"What happened that time? \"      Per patient has happened before after eating. Worse on Sunday    8. CAUSE: \"What do you think is causing the abdominal pain? \"      Unsure    9. RELIEVING/AGGRAVATING FACTORS: \"What makes it better or worse? \" (e.g., movement, antacids, bowel movement)      Denies    10. OTHER SYMPTOMS: \"Has there been any vomiting, diarrhea, constipation, or urine problems? \"        Shortness of breath with abdominal bloating    Protocols used: ABDOMINAL PAIN - MALE-ADULT-OH    Received call from [facility] with Red Flag Complaint. Brief description of triage: See above. Triage indicates for patient to be seen today. Care advice provided, patient verbalizes understanding; denies any other questions or concerns; instructed to call back for any new or worsening symptoms. Writer provided warm transfer to [facility] for appointment scheduling. Attention Provider: Thank you for allowing me to participate in the care of your patient. The patient was connected to triage in response to information provided to the ECC/PSC. Please do not respond through this encounter as the response is not directed to a shared pool.

## 2021-10-19 PROBLEM — E44.0 MODERATE MALNUTRITION (HCC): Chronic | Status: RESOLVED | Noted: 2020-02-28 | Resolved: 2021-10-19

## 2021-11-15 ENCOUNTER — OFFICE VISIT (OUTPATIENT)
Dept: FAMILY MEDICINE CLINIC | Age: 36
End: 2021-11-15
Payer: COMMERCIAL

## 2021-11-15 VITALS
HEART RATE: 84 BPM | BODY MASS INDEX: 29.18 KG/M2 | WEIGHT: 197.6 LBS | DIASTOLIC BLOOD PRESSURE: 70 MMHG | TEMPERATURE: 97.1 F | OXYGEN SATURATION: 99 % | SYSTOLIC BLOOD PRESSURE: 110 MMHG

## 2021-11-15 DIAGNOSIS — J34.89 NASAL OBSTRUCTION: ICD-10-CM

## 2021-11-15 DIAGNOSIS — K58.9 IRRITABLE BOWEL SYNDROME, UNSPECIFIED TYPE: ICD-10-CM

## 2021-11-15 DIAGNOSIS — F41.9 ANXIETY: Primary | ICD-10-CM

## 2021-11-15 PROCEDURE — 99214 OFFICE O/P EST MOD 30 MIN: CPT | Performed by: FAMILY MEDICINE

## 2021-11-15 RX ORDER — FLUTICASONE PROPIONATE 50 MCG
2 SPRAY, SUSPENSION (ML) NASAL DAILY
Qty: 16 G | Refills: 12 | Status: SHIPPED | OUTPATIENT
Start: 2021-11-15

## 2021-11-15 NOTE — PROGRESS NOTES
Patient is here today for follow up on IBS and anxiety. States that he isn't having the discomfort or bloating unless he eats to much. SOB has resolved, but has noticed that he isn't breathing out of his left nostril. States feels like plugged even after blows it. States heartburn has resolved since restarting omprazole. Also here for anxiety. Restarted the Sertraline and \"thinks\" it is working well for him. States feeling \"better\" but feels like could maybe be better. Vitals:    11/15/21 1534   BP: 110/70   Site: Left Upper Arm   Position: Sitting   Cuff Size: Large Adult   Pulse: 84   Temp: 97.1 °F (36.2 °C)   TempSrc: Infrared   SpO2: 99%   Weight: 197 lb 9.6 oz (89.6 kg)     Wt Readings from Last 3 Encounters:   11/15/21 197 lb 9.6 oz (89.6 kg)   10/12/21 194 lb 9.6 oz (88.3 kg)   01/15/21 191 lb 6.4 oz (86.8 kg)     Body mass index is 29.18 kg/m². PHQ Scores 1/15/2021 10/10/2017   PHQ2 Score 2 2   PHQ9 Score 2 2           GEN: Alert and oriented x 4 NAD, affect appropriate and overweight, well hydrated, well developed. Left nares congested mucosa with clear thick drainage  Right wide open  Deviation to right        ASSESSMENT AND PLAN:       Aditi Vick was seen today for anxiety and irritable bowel syndrome. Diagnoses and all orders for this visit:    Anxiety  Increase to 75mg (I suggested 100mg but pt wanted to go slow)  Call with update in a month    Irritable bowel syndrome, unspecified type  Improved, monitor    Nasal obstruction  Trial flonase, if not improving see ENT    Other orders  -     sertraline (ZOLOFT) 50 MG tablet;  Take 1.5 tablets by mouth daily  -     fluticasone (FLONASE) 50 MCG/ACT nasal spray; 2 sprays by Nasal route daily Both Nostrils            Portions of Note per  Garret Fountain CMA AAMA with corrections and edits per Ciaran Fabian MD.  I agree with entirety of note and was present and performed history and physical.  I also confirm that the note above accurately reflects all work, treatment, procedures, and medical decision making performed by me, Shaila Mancera MD

## 2021-12-21 ENCOUNTER — OFFICE VISIT (OUTPATIENT)
Dept: FAMILY MEDICINE CLINIC | Age: 36
End: 2021-12-21
Payer: COMMERCIAL

## 2021-12-21 VITALS
DIASTOLIC BLOOD PRESSURE: 80 MMHG | SYSTOLIC BLOOD PRESSURE: 118 MMHG | TEMPERATURE: 97.2 F | BODY MASS INDEX: 28.21 KG/M2 | WEIGHT: 191 LBS

## 2021-12-21 DIAGNOSIS — L02.91 ABSCESS: Primary | ICD-10-CM

## 2021-12-21 PROCEDURE — 99213 OFFICE O/P EST LOW 20 MIN: CPT | Performed by: FAMILY MEDICINE

## 2021-12-21 RX ORDER — DOXYCYCLINE HYCLATE 100 MG
100 TABLET ORAL 2 TIMES DAILY
Qty: 20 TABLET | Refills: 0 | Status: SHIPPED | OUTPATIENT
Start: 2021-12-21 | End: 2021-12-31

## 2021-12-21 ASSESSMENT — ENCOUNTER SYMPTOMS: BACK PAIN: 0

## 2021-12-21 NOTE — PROGRESS NOTES
SUBJECTIVE:    Ferdinand Luu is a 43 y.o. male who presents for a follow up visit. Chief Complaint   Patient presents with    Cyst     Possible infected cyst right side, near belt line. Started a couple weeks ago. HPI Abscess right flank  Patient presents with 2-week history of probable abscess right flank. He states he thought initially was a bug bite. He states the edema seemed greater at onset. Since that time he has had spontaneous drainage. He continues to have a little drainage now. Patient's medications, allergies, past medical,surgical, social and family histories were reviewed and updated as appropriate. Past Medical History:   Diagnosis Date    Anxiety     GERD (gastroesophageal reflux disease)     History of chicken pox     IBS (irritable bowel syndrome)      Past Surgical History:   Procedure Laterality Date    CHOLECYSTECTOMY, LAPAROSCOPIC N/A 3/5/2020    CHOLECYSTECTOMY LAPAROSCOPIC WITH CHOLANGIOGRAM performed by Noelle Ledbetter MD at [facility]. / BIOPSY SKIN LESION OF TRUNK N/A 12/18/2018    EXCISION OF SEBACEOUS CYST ON BACK performed by Carly Cabezas MD at [address] N/A 3/4/2020    EGD ESOPHAGOGASTRODUODENOSCOPY ULTRASOUND performed by Daja Romano MD at [address] ENDOSCOPY N/A 3/4/2020    EGD BIOPSY performed by Daja Romano MD at [address]     Family History   Problem Relation Age of Onset    Hypertension Father     Cancer Father     Cancer Other      Social History     Tobacco Use    Smoking status: Current Every Day Smoker     Packs/day: 0.50     Years: 23.00     Pack years: 11.50     Types: Cigarettes    Smokeless tobacco: Former User     Types: Chew   Substance Use Topics    Alcohol use:  Yes      Allergies   Allergen Reactions    Amoxicillin-Pot Clavulanate      Doesn't remember rx    Citalopram      Jittery, sweating, felt \"weird\"    Midrin [Apap-Isometheptene-Dichloral]      Doesn't remember rx    Penicillins      Doesn't remember rx     Current Outpatient Medications on File Prior to Visit   Medication Sig Dispense Refill    sertraline (ZOLOFT) 50 MG tablet Take 1.5 tablets by mouth daily 135 tablet 3    fluticasone (FLONASE) 50 MCG/ACT nasal spray 2 sprays by Nasal route daily Both Nostrils 16 g 12    omeprazole (PRILOSEC) 40 MG delayed release capsule Take 1 capsule by mouth every morning (before breakfast) 30 capsule 1     No current facility-administered medications on file prior to visit. Review of Systems   Constitutional: Negative for fever. Musculoskeletal: Negative for back pain. OBJECTIVE:    /80   Temp 97.2 °F (36.2 °C)   Wt 191 lb (86.6 kg)   BMI 28.21 kg/m²    Physical Exam  Constitutional:       General: He is not in acute distress. Appearance: Normal appearance. HENT:      Head: Normocephalic and atraumatic. Cardiovascular:      Rate and Rhythm: Normal rate and regular rhythm. Pulmonary:      Effort: Pulmonary effort is normal.      Breath sounds: Normal breath sounds. Skin:     Findings: Abscess ( Right flank, already draining a small amount with minimal surrounding induration.) present. Neurological:      Mental Status: He is alert. Psychiatric:         Mood and Affect: Mood normal.         Behavior: Behavior normal.         ASSESSMENT/PLAN:    Lorena Hampton was seen today for cyst.    Diagnoses and all orders for this visit:    Abscess  -     doxycycline hyclate (VIBRA-TABS) 100 MG tablet; Take 1 tablet by mouth 2 times daily for 10 days  -     Soha Hunter MD, General Surgery, [facility]        Return if symptoms worsen or fail to improve. Please note portions of this note were completed with a voicerecognition program.  Efforts were made to edit the dictations but occasionally words are mis-transcribed.

## 2022-01-26 ENCOUNTER — PATIENT MESSAGE (OUTPATIENT)
Dept: FAMILY MEDICINE CLINIC | Age: 37
End: 2022-01-26

## 2022-01-26 NOTE — TELEPHONE ENCOUNTER
From: Rand Garrison  To: Dr. Haines Ray: 1/26/2022 12:58 PM EST  Subject: Prescriptions     Hi Dr. Shanae Ryan,     I forgot to let you know about upping the dose of sertraline. I cant say Mark Almeida noticed much of a difference but would say there is slight improvement seemingly. I was also wondering if I could stay on the omeprazole or if there was a better alternative. My stomach seems regularly better while on it, and I dont get the almost daily heartburn with it. Being off of it my stomach doesnt seem as confident and the heartburn returns, and there are no more refills for it.     Thanks,    Domenica Díaz

## 2022-02-01 RX ORDER — OMEPRAZOLE 40 MG/1
CAPSULE, DELAYED RELEASE ORAL
Qty: 30 CAPSULE | Refills: 1 | Status: SHIPPED | OUTPATIENT
Start: 2022-02-01 | End: 2022-04-28

## 2022-02-01 NOTE — TELEPHONE ENCOUNTER
Medication:   Requested Prescriptions     Pending Prescriptions Disp Refills    omeprazole (PRILOSEC) 40 MG delayed release capsule [Pharmacy Med Name: OMEPRAZOLE DR 40 MG CAPSULE] 30 capsule 1     Sig: TAKE ONE CAPSULE BY MOUTH EVERY MORNING BEFORE BREAKFAST          Patient Phone Number: [phone] (home)     Last appt: 12/21/2021   Next appt: Visit date not found    Last OARRS: No flowsheet data found.   PDMP Monitoring:    Last PDMP Benjamin marie Reviewed [facility]):  Review User Review Instant Review Result          Preferred Pharmacy:   [facility] [address] [phone] Ary Dakins [phone]  [address] [place] [address]  Phone: [phone] Fax: [phone]

## 2022-04-28 RX ORDER — OMEPRAZOLE 40 MG/1
CAPSULE, DELAYED RELEASE ORAL
Qty: 30 CAPSULE | Refills: 1 | Status: SHIPPED | OUTPATIENT
Start: 2022-04-28 | End: 2022-07-29

## 2022-04-28 NOTE — TELEPHONE ENCOUNTER
Medication:   Requested Prescriptions     Pending Prescriptions Disp Refills    omeprazole (PRILOSEC) 40 MG delayed release capsule [Pharmacy Med Name: OMEPRAZOLE DR 40 MG CAPSULE] 30 capsule 1     Sig: TAKE ONE CAPSULE BY MOUTH EVERY MORNING BEFORE BREAKFAST          Patient Phone Number: [phone] (home)     Last appt: 12/21/2021   Next appt: Visit date not found    Last OARRS: No flowsheet data found.   PDMP Monitoring:    Last PDMP [place] as Reviewed [facility]):  Review User Review Instant Review Result          Preferred Pharmacy:   [facility] [id number] [address] [phone] [facility] [phone]  [address] [place] [address]  Phone: [phone] Fax: [phone]

## 2022-05-05 ENCOUNTER — OFFICE VISIT (OUTPATIENT)
Dept: FAMILY MEDICINE CLINIC | Age: 37
End: 2022-05-05
Payer: COMMERCIAL

## 2022-05-05 VITALS
SYSTOLIC BLOOD PRESSURE: 124 MMHG | DIASTOLIC BLOOD PRESSURE: 88 MMHG | OXYGEN SATURATION: 99 % | HEART RATE: 98 BPM | BODY MASS INDEX: 29.21 KG/M2 | TEMPERATURE: 98.2 F | WEIGHT: 197.8 LBS

## 2022-05-05 DIAGNOSIS — M54.16 LUMBAR RADICULOPATHY: Primary | ICD-10-CM

## 2022-05-05 PROCEDURE — 99213 OFFICE O/P EST LOW 20 MIN: CPT | Performed by: FAMILY MEDICINE

## 2022-05-05 RX ORDER — PREDNISONE 20 MG/1
60 TABLET ORAL DAILY
Qty: 15 TABLET | Refills: 0 | Status: SHIPPED | OUTPATIENT
Start: 2022-05-05 | End: 2022-05-10

## 2022-05-05 NOTE — PATIENT INSTRUCTIONS
Patient Education        Sciatica: Care Instructions  Your Care Instructions     Sciatica (say \"bym-YJ-vg-kuh\") is an irritation of one of the sciatic nerves, which come from the spinal cord in the lower back. The sciatic nerves and their branches extend down through the buttock to the foot. Sciatica can develop when an injured disc in the back irritates or presses against a spinal nerve root. Its main symptom is pain, numbness, or weakness that is often worse in the legor foot than in the back. Sciatica often will improve and go away with time. Early treatment usuallyincludes medicines and exercises to relieve pain. Follow-up care is a key part of your treatment and safety. Be sure to make and go to all appointments, and call your doctor if you are having problems. It's also a good idea to know your test results and keep alist of the medicines you take. How can you care for yourself at home?  Take pain medicines exactly as directed. ? If the doctor gave you a prescription medicine for pain, take it as prescribed. ? If you are not taking a prescription pain medicine, ask your doctor if you can take an over-the-counter medicine.  Use heat or ice to relieve pain. ? To apply heat, put a warm water bottle, heating pad set on low, or warm cloth on your back. Do not go to sleep with a heating pad on your skin. ? To use ice, put ice or a cold pack on the area for 10 to 20 minutes at a time. Put a thin cloth between the ice and your skin.  Avoid sitting if possible, unless it feels better than standing.  Alternate lying down with short walks. Increase your walking distance as you are able to without making your symptoms worse.  Do not do anything that makes your symptoms worse. When should you call for help? Call 911 anytime you think you may need emergency care. For example, call if:     You are unable to move a leg at all.    Call your doctor now or seek immediate medical care if:     You have new or worse symptoms in your legs or buttocks. Symptoms may include:  ? Numbness or tingling. ? Weakness. ? Pain.      You lose bladder or bowel control. Watch closely for changes in your health, and be sure to contact your doctor if:     You are not getting better as expected. Where can you learn more? Go to https://chpepiceweb.[employer]. org and sign in to your [employer] account. Enter [phone] in the Mapflow box to learn more about \"Sciatica: Care Instructions. \"     If you do not have an account, please click on the \"Sign Up Now\" link. Current as of: July 1, 2021               Content Version: 13.2  © 2006-2022 [employer]. Care instructions adapted under license by [facility] ([facility]). If you have questions about a medical condition or this instruction, always ask your healthcare professional. [place] [address] any warranty or liability for your use of this information. Patient Education        Back Stretches: Exercises  Introduction  Here are some examples of exercises for stretching your back. Start eachexercise slowly. Ease off the exercise if you start to have pain. Your doctor or physical therapist will tell you when you can start theseexercises and which ones will work best for you. How to do the exercises  Overhead stretch    1. Stand comfortably with your feet shoulder-width apart. 2. Looking straight ahead, raise both arms over your head and reach toward the ceiling. Do not allow your head to tilt back. 3. Hold for 15 to 30 seconds, then lower your arms to your sides. 4. Repeat 2 to 4 times. Side stretch    1. Stand comfortably with your feet shoulder-width apart. 2. Raise one arm over your head, and then lean to the other side. 3. Slide your hand down your leg as you let the weight of your arm gently stretch your side muscles. Hold for 15 to 30 seconds. 4. Repeat 2 to 4 times on each side. Press-up    1.  Lie on your stomach, supporting your body with your forearms. 2. Press your elbows down into the floor to raise your upper back. As you do this, relax your stomach muscles and allow your back to arch without using your back muscles. As your press up, do not let your hips or pelvis come off the floor. 3. Hold for 15 to 30 seconds, then relax. 4. Repeat 2 to 4 times. Relax and rest    1. Lie on your back with a rolled towel under your neck and a pillow under your knees. Extend your arms comfortably to your sides. 2. Relax and breathe normally. 3. Remain in this position for about 10 minutes. 4. If you can, do this 2 or 3 times each day. Follow-up care is a key part of your treatment and safety. Be sure to make and go to all appointments, and call your doctor if you are having problems. It's also a good idea to know your test results and keep alist of the medicines you take. Where can you learn more? Go to https://Persado.[employer]. org and sign in to your [employer] account. Enter D218 in the [employer] box to learn more about \"Back Stretches: Exercises. \"     If you do not have an account, please click on the \"Sign Up Now\" link. Current as of: July 1, 2021               Content Version: 13.2  © 2006-2022 [employer]. Care instructions adapted under license by [facility] ([facility]). If you have questions about a medical condition or this instruction, always ask your healthcare professional. [place] [address] any warranty or liability for your use of this information. Patient Education        Low Back Pain: Exercises  Introduction  Here are some examples of exercises for you to try. The exercises may be suggested for a condition or for rehabilitation. Start each exercise slowly. Ease off the exercises if you start to have pain. You will be told when to start these exercises and which ones will work bestfor you. How to do the exercises  Press-up    1.  Lie on your stomach, supporting your body with your forearms. 2. Press your elbows down into the floor to raise your upper back. As you do this, relax your stomach muscles and allow your back to arch without using your back muscles. As your press up, do not let your hips or pelvis come off the floor. 3. Hold for 15 to 30 seconds, then relax. 4. Repeat 2 to 4 times. Alternate arm and leg (bird dog) exercise    Do this exercise slowly. Try to keep your body straight at all times, and donot let one hip drop lower than the other. 1. Start on the floor, on your hands and knees. 2. Tighten your belly muscles. 3. Raise one leg off the floor, and hold it straight out behind you. Be careful not to let your hip drop down, because that will twist your trunk. 4. Hold for about 6 seconds, then lower your leg and switch to the other leg. 5. Repeat 8 to 12 times on each leg. 6. Over time, work up to holding for 10 to 30 seconds each time. 7. If you feel stable and secure with your leg raised, try raising the opposite arm straight out in front of you at the same time. Knee-to-chest exercise    1. Lie on your back with your knees bent and your feet flat on the floor. 2. Bring one knee to your chest, keeping the other foot flat on the floor (or keeping the other leg straight, whichever feels better on your lower back). 3. Keep your lower back pressed to the floor. Hold for at least 15 to 30 seconds. 4. Relax, and lower the knee to the starting position. 5. Repeat with the other leg. Repeat 2 to 4 times with each leg. 6. To get more stretch, put your other leg flat on the floor while pulling your knee to your chest.  Curl-ups    1. Lie on the floor on your back with your knees bent at a 90-degree angle. Your feet should be flat on the floor, about 12 inches from your buttocks. 2. Cross your arms over your chest. If this bothers your neck, try putting your hands behind your neck (not your head), with your elbows spread apart.   3. Slowly tighten your belly muscles and raise your shoulder blades off the floor. 4. Keep your head in line with your body, and do not press your chin to your chest.  5. Hold this position for 1 or 2 seconds, then slowly lower yourself back down to the floor. 6. Repeat 8 to 12 times. Pelvic tilt exercise    1. Lie on your back with your knees bent. 2. \"Brace\" your stomach. This means to tighten your muscles by pulling in and imagining your belly button moving toward your spine. You should feel like your back is pressing to the floor and your hips and pelvis are rocking back. 3. Hold for about 6 seconds while you breathe smoothly. 4. Repeat 8 to 12 times. Heel dig bridging    1. Lie on your back with both knees bent and your ankles bent so that only your heels are digging into the floor. Your knees should be bent about 90 degrees. 2. Then push your heels into the floor, squeeze your buttocks, and lift your hips off the floor until your shoulders, hips, and knees are all in a straight line. 3. Hold for about 6 seconds as you continue to breathe normally, and then slowly lower your hips back down to the floor and rest for up to 10 seconds. 4. Do 8 to 12 repetitions. Hamstring stretch in doorway    1. Lie on your back in a doorway, with one leg through the open door. 2. Slide your leg up the wall to straighten your knee. You should feel a gentle stretch down the back of your leg. 3. Hold the stretch for at least 15 to 30 seconds. Do not arch your back, point your toes, or bend either knee. Keep one heel touching the floor and the other heel touching the wall. 4. Repeat with your other leg. 5. Do 2 to 4 times for each leg. Hip flexor stretch    1. Kneel on the floor with one knee bent and one leg behind you. Place your forward knee over your foot. Keep your other knee touching the floor. 2. Slowly push your hips forward until you feel a stretch in the upper thigh of your rear leg. 3. Hold the stretch for at least 15 to 30 seconds. Repeat with your other leg. 4. Do 2 to 4 times on each side. Wall sit    1. Stand with your back 10 to 12 inches away from a wall. 2. Lean into the wall until your back is flat against it. 3. Slowly slide down until your knees are slightly bent, pressing your lower back into the wall. 4. Hold for about 6 seconds, then slide back up the wall. 5. Repeat 8 to 12 times. Follow-up care is a key part of your treatment and safety. Be sure to make and go to all appointments, and call your doctor if you are having problems. It's also a good idea to know your test results and keep alist of the medicines you take. Where can you learn more? Go to https://"[email or web address]. org and sign in to your [employer] account. Enter Y370 in the [employer] box to learn more about \"Low Back Pain: Exercises. \"     If you do not have an account, please click on the \"Sign Up Now\" link. Current as of: July 1, 2021               Content Version: 13.2  © 2006-2022 [employer]. Care instructions adapted under license by [facility] [place] ([facility]). If you have questions about a medical condition or this instruction, always ask your healthcare professional. [address] any warranty or liability for your use of this information.

## 2022-05-05 NOTE — PROGRESS NOTES
Patient is here complaining of back pain that started Jan 2022, so 5 month(s) ago. Initial inciting event: had Covid and was on couch for 2 wk's and unsure if it was due to this or not, doesn't recall a lot of injury. Pain is described as: aching and dull  Pain down legs: Yes, left side of leg and calf  Numbness or Tingling: No  Worse with: none  Better with: shifting positions, sitting with left leg crossed over right. Bowel changes: No  Bladder changes: No    Patient has tried nothing to help with pain with no. Pain is unchanged. Allergies   Allergen Reactions    Amoxicillin-Pot Clavulanate      Doesn't remember rx    Citalopram      Jittery, sweating, felt \"weird\"    Midrin [Apap-Isometheptene-Dichloral]      Doesn't remember rx    Penicillins      Doesn't remember rx       Vitals:    05/05/22 1642   BP: 124/88   Site: Left Upper Arm   Position: Sitting   Cuff Size: Medium Adult   Pulse: 98   Temp: 98.2 °F (36.8 °C)   TempSrc: Infrared   SpO2: 99%   Weight: 197 lb 12.8 oz (89.7 kg)     Wt Readings from Last 3 Encounters:   05/05/22 197 lb 12.8 oz (89.7 kg)   12/21/21 191 lb (86.6 kg)   11/15/21 197 lb 9.6 oz (89.6 kg)     Body mass index is 29.21 kg/m². No data recorded    GEN: Alert and oriented x 4 NAD, affect appropriate and overweight, well hydrated, well developed. lumbar spine    Flexion: decreased with pain. moderate, on left  Extension: normal without pain. Palpation: spinous processes are non-tender on palpation, paraspinous muscles are non-tender on palpation,     Strength 5/5 lower extremity bilaterally with pain. Normal sensation to light touch bilaterally  2+ DTR lower bilaterally   SLR positive bilaterally. Right radiates to left          ASSESSMENT AND PLAN:       Elyssa De Anda was seen today for back pain.     Diagnoses and all orders for this visit:    Lumbar radiculopathy  -     [id number] [facility] [address]  If not improving in a month let us know, will need MRI    Other orders  -     predniSONE (DELTASONE) 20 MG tablet;  Take 3 tablets by mouth daily for 5 days              Portions of Note per  Brittanie Perez CMA AAMA with corrections and edits per Patrick Box MD.  I agree with entirety of note and was present and performed history and physical.  I also confirm that the note above accurately reflects all work, treatment, procedures, and medical decision making performed by me, Patrick Box MD

## 2022-05-18 ENCOUNTER — HOSPITAL ENCOUNTER (OUTPATIENT)
Dept: PHYSICAL THERAPY | Age: 37
Setting detail: THERAPIES SERIES
Discharge: HOME | End: 2022-05-18
Payer: COMMERCIAL

## 2022-05-18 PROCEDURE — 97161 PT EVAL LOW COMPLEX 20 MIN: CPT

## 2022-05-18 PROCEDURE — 97110 THERAPEUTIC EXERCISES: CPT

## 2022-05-18 NOTE — PROGRESS NOTES
[place] [address]. [place]ios [address]  Phone: [phone]   Fax:     [phone]                                                       Physical Therapy Certification    Dear Dr. Tawny Payan,    We had the pleasure of evaluating the following patient for physical therapy services at [facility]. A summary of our findings can be found in the initial assessment below. This includes our plan of care. If you have any questions or concerns regarding these findings, please do not hesitate to contact me at the office phone number checked above. Thank you for the referral.    RECOMMEND DRY NEEDLING       Physician Signature:_______________________________Date:__________________  By signing above (or electronic signature), therapists plan is approved by physician                  Patient: Jocelyn Dickinson   : 1985   MRN: [id number]  Referring Physician: Dr. Tawny Payan      Evaluation Date: 2022      Medical Diagnosis Information:  Diagnosis: Lumbar radiculopathy (M54.16)   Treatment Diagnosis: Decreased ADL status (Z73.6)                                         Insurance information: PT Insurance Information: U[employer]     Precautions/ Contra-indications:   Latex Allergy:  [x]NO      []YES  Preferred Language for Healthcare:   [x]English       []other:    C-SSRS Triggered by Intake questionnaire (Past 2 wk assessment ):   [x] No, Questionnaire did not trigger screening.   [] Yes, Patient intake triggered C-SSRS Screening      [] C-SSRS Screening completed  [] PCP notified via Epic     SUBJECTIVE: Pt had COVID in January and pain started when he was sedentary, lying on a . Patient stated over the past 4-5 months he has had low back pain, L buttock, L posterolateral thigh, posterior calf pain, described as a dull pain or at times a tingling.   Pain sometimes affects his ability to don socks, standing a lot at work, bending activities aggravate pain. Pt has increase in pain with sneezing.     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, standing long periods, sitting long periods    Living Status/Prior Level of Function: Independent with ADLs and IADLs    OBJECTIVE:   Repeated Movements:flexion 10 times standing- no change      ROM  Comments   Lumbar Flex Decreased 25%, L thigh pain    Lumbar Ext Decreased 50%, no pain      ROM LEFT RIGHT Comments   Lumbar Side Bend WFL, L calf pain WFL, no pain    Lumbar Rotation      Quadrant (+)- L buttock pain (-)    [facility] WFL    Hamstring Flex      Piriformis      Hector test                Myotomes/Strength Normal Abnormal Comments   [x]ALL NORMAL      Hip Abd normal     Hip Ext normal     Hip flexion (L1-L2 femoral) [x] []    Knee extension (L2-L4 femoral) [] [] 4/5 B   Knee flexion (S1 sciatic) normal     Dorsiflexion (L4-L5 deep peroneal) [x] []    Great Toe Ext (L5 deep peroneal nerve) [x] []    Ankle Eversion (S1-S2 super peroneal) [x] []    Ankle PF(S1-S2 tibial) [x] []    Multifidus [] []    Transverse Ab [] []      Dermatomes Normal Abnormal Comments   [x]ALL NORMAL            inguinal area (L1)  [] []    anterior mid-thigh (L2) [] []    distal ant thigh/med knee (L3) [] []    medial lower leg and foot (L4) [] []    lateral lower leg and foot (L5) [] []    posterior calf (S1) [] []    medial calcaneus (S2) [] []      Reflexes Normal Abnormal Comments   [x]ALL NORMAL            S1-2 Seated achilles [x] []    S1-2 Prone knee bend [] []    L3-4 Patellar tendon [x] []    C5-6 Biceps [] []    C6 Brachioradialis [] []    C7-8 Triceps [] []    Clonus [x] []    Babinski [] []    Prather's [] []      Joint mobility: pain L unilateral L4/5 grade IV   []Normal    []Hypo   []Hyper    Palpation: NT    Functional Mobility/Transfers: I    Posture: decreased lumbar lordosis    Gait: (include devices/WB status) WFL    Bandages/Dressings/Incisions: NA      Orthopedic Special Tests:   Neural dynamic tension testing Normal Abnormal Comments   Slump Test  - Degree of knee flexion:  [] [x] Pain at L buttock and L calf   SLR  [] []    0-30 [] []    30-70 [] []    Femoral nerve (L2-4) [] []       Normal Abnormal N/A Comments   Fwd Bend-aberrant or innominate mvmt) [x] [] []    Trendelenburg [] [] []    Kemps/Quadrant [] [] []    Dahiana Zamora [] [] []    CRIS/Antony [] [] []    Hip scour [] [] []    Supine to sit [] [] []    Prone knee bend [] [] []           Hip thrust [] [] []    SI distraction/compression [] [] []    Sacral Spring/thrust [] [] []               [x] Patient history, allergies, meds reviewed. Medical chart reviewed. See intake form. Review Of Systems (ROS):  [x]Performed Review of systems (Integumentary, CardioPulmonary, Neurological) by intake and observation. Intake form has been scanned into medical record. Patient has been instructed to contact their primary care physician regarding ROS issues if not already being addressed at this time.       Co-morbidities/Complexities (which will affect course of rehabilitation):   []None           Arthritic conditions   []Rheumatoid arthritis (M05.9)  []Osteoarthritis (M19.91)   Cardiovascular conditions   []Hypertension (I10)  []Hyperlipidemia (E78.5)  []Angina pectoris (I20)  []Atherosclerosis (I70)  []CVA Musculoskeletal conditions   []Disc pathology   []Congenital spine pathologies   []Prior surgical intervention  []Osteoporosis (M81.8)  []Osteopenia (M85.8)   Endocrine conditions   []Hypothyroid (E03.9)  []Hyperthyroid Gastrointestinal conditions   []Constipation (D27.61)   Metabolic conditions   []Morbid obesity (E66.01)  []Diabetes type 1(E10.65) or 2 (E11.65)   []Neuropathy (G60.9)     Pulmonary conditions   []Asthma (J45)  []Coughing   []COPD (J44.9)   Psychological Disorders  [x]Anxiety (F41.9)  []Depression (F32.9)   []Other:   [x]Other:      IBS          Barriers to/and or personal factors that will affect rehab potential:              []Age  []Sex    []Smoker              []Motivation/Lack of Motivation                        []Co-Morbidities              []Cognitive Function, education/learning barriers              []Environmental, home barriers              []profession/work barriers  []past PT/medical experience  []other:  Justification:     Falls Risk Assessment (30 days):   [x] Falls Risk assessed and no intervention required.   [] Falls Risk assessed and Patient requires intervention due to being higher risk   TUG score (>12s at risk):     [] Falls education provided, including:         ASSESSMENT:   Functional Impairments:     [x]Noted lumbar/proximal hip hypomobility   []Noted lumbosacral and/or generalized hypermobility   [x]Decreased Lumbosacral/hip/LE functional ROM   [x]Decreased core/proximal hip strength and neuromuscular control    [x]Decreased LE functional strength    []Abnormal reflexes/sensation/myotomal/dermatomal deficits  [x]Reduced balance/proprioceptive control    []other:      Functional Activity Limitations (from functional questionnaire and intake)   [x]Reduced ability to tolerate prolonged functional positions   []Reduced ability or difficulty with changes of positions or transfers between positions   []Reduced ability to maintain good posture and demonstrate good body mechanics with sitting, bending, and lifting   []Reduced ability to sleep   [] Reduced ability or tolerance with driving and/or computer work   [x]Reduced ability to perform lifting, reaching, carrying tasks   [x]Reduced ability to squat   [x]Reduced ability to forward bend   [x]Reduced ability to ambulate prolonged functional periods/distances/surfaces   []Reduced ability to ascend/descend stairs   []other: Participation Restrictions   [x]Reduced participation in self care activities   [x]Reduced participation in home management activities   [x]Reduced participation in work activities   [x]Reduced participation in social activities. [x]Reduced participation in sport/recreational activities. Classification:   []Signs/symptoms consistent with Lumbar instability/stabilization subgroup. []Signs/symptoms consistent with Lumbar mobilization/manipulation subgroup, myotomes and dermatomes intact. Meets manipulation criteria. [x]Signs/symptoms consistent with Lumbar direction specific/centralization subgroup   []Signs/symptoms consistent with Lumbar traction subgroup       []Signs/symptoms consistent with lumbar facet dysfunction   []Signs/symptoms consistent with lumbar stenosis type dysfunction   []Signs/symptoms consistent with nerve root involvement including myotome & dermatome dysfunction   []Signs/symptoms consistent with post-surgical status including: decreased ROM, strength and function.    []signs/symptoms consistent with pathology which may benefit from Dry needling     []other:      Prognosis/Rehab Potential:      []Excellent   [x]Good    []Fair   []Poor    Tolerance of evaluation/treatment:    []Excellent   [x]Good    []Fair   []Poor     Physical Therapy Evaluation Complexity Justification  [x] A history of present problem with:  [] no personal factors and/or comorbidities that impact the plan of care;  [x]1-2 personal factors and/or comorbidities that impact the plan of care  []3 personal factors and/or comorbidities that impact the plan of care  [x] An examination of body systems using standardized tests and measures addressing any of the following: body structures and functions (impairments), activity limitations, and/or participation restrictions;:  [] a total of 1-2 or more elements   [x] a total of 3 or more elements   [] a total of 4 or more elements   [x] A clinical presentation with:  [x] stable and/or uncomplicated characteristics   [] evolving clinical presentation with changing characteristics  [] unstable and unpredictable characteristics;   [x] Clinical decision making of [] low, [] moderate, [] high complexity using standardized patient assessment instrument and/or measurable assessment of functional outcome. [x] EVAL (LOW) 32510 (typically 20 minutes face-to-face)  [] EVAL (MOD) 26373 (typically 30 minutes face-to-face)  [] EVAL (HIGH) 53356 (typically 45 minutes face-to-face)  [] RE-EVAL     PLAN: Begin PT focusing on: proximal hip mobilizations, LB mobs, LB core activation, proximal hip activation, and HEP    Frequency/Duration:  1-2 days per week for 4-6 Weeks:  Interventions:  [x]  Therapeutic exercise including: strength training, ROM, for LE, Glutes and core   [x]  NMR activation and proprioception for glutes , LE and Core   [x]  Manual therapy as indicated for Hip complex, LE and spine to include: Dry Needling/IASTM, STM, PROM, Gr I-IV mobilizations, manipulation. [x]  Modalities as needed that may include: thermal agents, E-stim, Biofeedback, US, iontophoresis as indicated  [x]  Patient education on joint protection, postural re-education, activity modification, progression of HEP. HEP instruction:   Access Code: [email or web address]  URL: "[email or web address]  Date: 05/18/2022  Prepared by: Katarina Hamilton  Exercises  Prone Press Up - 1 x daily - 7 x weekly - 10 reps - 10 hold  Seated Sciatic Tensioner - 1 x daily - 7 x weekly - 10 reps  Prone Hip Extension - Two Pillows - 1 x daily - 7 x weekly - 10 reps - 5 hold  Cat-Camel - 1 x daily - 7 x weekly - 10 reps        GOALS:  Patient stated goal:\"functioning without or with minimal pain\"  [] Progressing: [] Met: [] Not Met: [] Adjusted  Therapist goals for Patient:   Short Term Goals: To be achieved in: 2 weeks  1. Independent in HEP and progression per patient tolerance, in order to prevent re-injury.    [] Progressing: [] Met: [] Not Met: [] Adjusted  2. Patient will have a decrease in pain to facilitate improvement in movement, function, and ADLs as indicated by Functional Deficits. [] Progressing: [] Met: [] Not Met: [] Adjusted    Long Term Goals: To be achieved in: 6 weeks  1. FOTO Lumbar will score 70 to assist with reaching prior level of function. [] Progressing: [] Met: [] Not Met: [] Adjusted  2. Patient will demonstrate increased AROM to WNL, good LS mobility, good hip ROM to allow for proper joint functioning as indicated by patients Functional Deficits. [] Progressing: [] Met: [] Not Met: [] Adjusted  3. Patient will demonstrate an increase in Strength to good proximal hip and core activation to allow for proper functional mobility as indicated by patients Functional Deficits. [] Progressing: [] Met: [] Not Met: [] Adjusted  4. Patient will return to [facility] without pain\" including donning socks without increased symptoms or restriction. [] Progressing: [] Met: [] Not Met: [] Adjusted  5. Patient will be able to stand 2 hours at work without aggravating pain. [] Progressing: [] Met: [] Not Met: [] Adjusted     Electronically signed by:  Phyllis Singh PT , OMT-C, [id number]          Note: If patient does not return for scheduled/recommended follow up visits, this note will serve as a discharge from care along with the most recent update on progress.

## 2022-05-18 NOTE — FLOWSHEET NOTE
[place] [address]. [place] [address]  Phone: [phone]   Fax:     [phone]    Physical Therapy Treatment Note/ Progress Report:     Date:  2022    Patient Name:  Helio Palacios    :  1985  MRN: [id number]    Pertinent Medical History:      Medical/Treatment Diagnosis Information:  · Diagnosis: Lumbar radiculopathy (M54.16)  · Treatment Diagnosis: Decreased ADL status (Y57.0)    Insurance/Certification information:  PT Insurance Information: R  Physician Information:  Dr. Andrei Rabago of care signed (Y/N): N    Date of Patient follow up with Physician:      Progress Report: []  Yes  [x]  No     Date Range for reporting period:  Beginnin22  Ending:    Progress report due (10 Rx/or 30 days whichever is less):     Recertification due (POC duration/ or 90 days whichever is less):     Visit # POC/ Insurance Allowable Auth Needed after 30   1 12 []Yes    [x]No     Pain level:  1-5/10   Functional Scale: FOTO Lumbar = 22 57    History of Injury:Pt had COVID in January and pain started when he was sedentary, lying on a . Patient stated over the past 4-5 months he has had low back pain, L buttock, L posterolateral thigh, posterior calf pain, described as a dull pain or at times a tingling. Pain sometimes affects his ability to don socks, standing a lot at work, bending activities aggravate pain. Pt has increase in pain with sneezing.     SUBJECTIVE:  See eval    OBJECTIVE:    Observation:    22  Joint mobility: pain L unilateral L4/5 grade IV              []?Normal                      []?Hypo              []?Hyper  Geovanna (-)     Test measurements:    ROM   Comments   Lumbar Flex Decreased 25%, L thigh pain     Lumbar Ext Decreased 50%, no pain        ROM LEFT RIGHT Comments   Lumbar Side Bend WFL, L calf pain WFL, no pain     Lumbar Rotation       Quadrant (+)- L buttock pain (-)     [facility] WFL     Hamstring Flex         Piriformis         Hector test           Slump test  (+)  (-)          RESTRICTIONS/PRECAUTIONS:     Exercises/Interventions:     Therapeutic Ex (14179)   Min: Repetitions/Resistance Notes/Cues                  Education Centralization vs peripheralization         HEP See below                   Manual Intervention ([phone]) Min:               NMR re-education (06162)   Min:     Mf Activation- re-ed     TrA Re-ed activation     Glute Max re-ed activation          Therapeutic Activity (59946) Min:               Modalities  Min:             Other Therapeutic Activities:  Pt was educated on PT POC, Diagnosis, Prognosis, pathomechanics as well as frequency and duration of scheduling future physical therapy appointments. Time was also taken on this day to answer all patient questions and participation in PT. Reviewed appointment policy in detail with patient and patient verbalized understanding. Home Exercise Program:  Access Code: [email or web address]  URL: [email or web address]  Date: 05/18/2022  Prepared by: Eddie Leonard  Exercises  Prone Press Up - 1 x daily - 7 x weekly - 10 reps - 10 hold  Seated Sciatic Tensioner - 1 x daily - 7 x weekly - 10 reps  Prone Hip Extension - Two Pillows - 1 x daily - 7 x weekly - 10 reps - 5 hold  Cat-Camel - 1 x daily - 7 x weekly - 10 reps    Therapeutic Exercise and NMR EXR  [] (58099) Provided verbal/tactile cueing for activities related to strengthening, flexibility, endurance, ROM  for improvements in proximal hip and core control with self care, mobility, lifting and ambulation.  [] (69799) Provided verbal/tactile cueing for activities related to improving balance, coordination, kinesthetic sense, posture, motor skill, proprioception  to assist with core control in self care, mobility, lifting, and ambulation.      Therapeutic Activities:    [] (53736 or ) Provided verbal/tactile cueing for activities related to improving balance, coordination, kinesthetic sense, posture, motor skill, proprioception and motor activation to allow for proper function  with self care and ADLs  [] (52510) Provided training and instruction to the patient for proper core and proximal hip recruitment and positioning with ambulation re-education     Home Exercise Program:    [] (41893) Reviewed/Progressed HEP activities related to strengthening, flexibility, endurance, ROM of core, proximal hip and LE for functional self-care, mobility, lifting and ambulation   [] (40014) Reviewed/Progressed HEP activities related to improving balance, coordination, kinesthetic sense, posture, motor skill, proprioception of core, proximal hip and LE for self care, mobility, lifting, and ambulation      Manual Treatments:  PROM / STM / Oscillations-Mobs:  G-I, II, III, IV (PA's, Inf., Post.)  [] (58369) Provided manual therapy to mobilize proximal hip and LS spine soft tissue/joints for the purpose of modulating pain, promoting relaxation,  increasing ROM, reducing/eliminating soft tissue swelling/inflammation/restriction, improving soft tissue extensibility and allowing for proper ROM for normal function with self care, mobility, lifting and ambulation. Charges:  Timed Code Treatment Minutes: 25   Total Treatment Minutes: 40     [x] EVAL (LOW) 76416 (typically 20 minutes face-to-face)  [] EVAL (MOD) 24345 (typically 30 minutes face-to-face)  [] EVAL (HIGH) 13724 (typically 45 minutes face-to-face)  [] RE-EVAL     [x] HY(16468) x 2    [] Dry needle 1 or 2 Muscles (93489)  [] NMR (70602) x     [] Dry needle 3+ Muscles (33251)  [] Manual (68703) x     [] Ultrasound (15806) x  [] TA (79796) x     [] Mech Traction (63359)  [] ES(attended) (49523)     [] ES (un) (80197):   [] Vasopump (56111) [] Ionto (55532)   [] Other:    GOALS:  Patient stated goal:\"functioning without or with minimal pain\"  []? Progressing: []? Met: []?  Not Met: []? Adjusted  Therapist goals for Patient:   Short Term Goals: To be achieved in: 2 weeks  1. Independent in HEP and progression per patient tolerance, in order to prevent re-injury. []? Progressing: []? Met: []? Not Met: []? Adjusted  2. Patient will have a decrease in pain to facilitate improvement in movement, function, and ADLs as indicated by Functional Deficits. []? Progressing: []? Met: []? Not Met: []? Adjusted     Long Term Goals: To be achieved in: 6 weeks  1. FOTO Lumbar will score 70 to assist with reaching prior level of function. []? Progressing: []? Met: []? Not Met: []? Adjusted  2. Patient will demonstrate increased AROM to WNL, good LS mobility, good hip ROM to allow for proper joint functioning as indicated by patients Functional Deficits. []? Progressing: []? Met: []? Not Met: []? Adjusted  3. Patient will demonstrate an increase in Strength to good proximal hip and core activation to allow for proper functional mobility as indicated by patients Functional Deficits. []? Progressing: []? Met: []? Not Met: []? Adjusted  4. Patient will return to [facility] without pain\" including donning socks without increased symptoms or restriction. []? Progressing: []? Met: []? Not Met: []? Adjusted  5. Patient will be able to stand 2 hours at work without aggravating pain. []? Progressing: []? Met: []? Not Met: []? Adjusted     ASSESSMENT:  See eval    Treatment/Activity Tolerance:  [x] Patient tolerated treatment well [] Patient limited by fatique  [] Patient limited by pain  [] Patient limited by other medical complications  [] Other:     Overall Progression Towards Functional goals/ Treatment Progress Update:  [] Patient is progressing as expected towards functional goals listed. [] Progression is slowed due to complexities/Impairments listed. [] Progression has been slowed due to co-morbidities.   [x] Plan just implemented, too soon to assess goals progression <30days   [] Goals require adjustment due to lack of progress  [] Patient is not progressing as expected and requires additional follow up with physician  [] Other:    Prognosis for POC: [x] Good [] Fair  [] Poor    Patient requires continued skilled intervention: [x] Yes  [] No        PLAN: See eval, begin prone PA mobs, consider STM, begin/progress prone progression and lumbar stab, dural tension activities; PN session # 5  [] Continue per plan of care [] Alter current plan (see comments)  [x] Plan of care initiated [] Hold pending MD visit [] Discharge    Electronically signed by: Yeison Thompson PT , OMT-C, [id number]      Note: If patient does not return for scheduled/recommended follow up visits, this note will serve as a discharge from care along with the most recent update on progress.

## 2022-06-06 ENCOUNTER — APPOINTMENT (OUTPATIENT)
Dept: PHYSICAL THERAPY | Age: 37
End: 2022-06-06
Payer: COMMERCIAL

## 2022-06-06 ENCOUNTER — HOSPITAL ENCOUNTER (OUTPATIENT)
Dept: PHYSICAL THERAPY | Age: 37
Setting detail: THERAPIES SERIES
Discharge: HOME | End: 2022-06-06
Payer: COMMERCIAL

## 2022-06-06 PROCEDURE — 97110 THERAPEUTIC EXERCISES: CPT

## 2022-06-06 PROCEDURE — 97140 MANUAL THERAPY 1/> REGIONS: CPT

## 2022-06-06 PROCEDURE — 97112 NEUROMUSCULAR REEDUCATION: CPT

## 2022-06-06 NOTE — FLOWSHEET NOTE
(-)     Test measurements:    ROM   Comments   Lumbar Flex Decreased 25%, L thigh pain     Lumbar Ext Decreased 50%, no pain        ROM LEFT RIGHT Comments   Lumbar Side Bend WFL, L calf pain WFL, no pain     Lumbar Rotation         Quadrant (+)- L buttock pain (-)     [facility]     Hamstring Flex         Piriformis         Hector test           Slump test  (+)  (-)          RESTRICTIONS/PRECAUTIONS:     Exercises/Interventions:     Therapeutic Ex (57387)   Min: 20 Repetitions/Resistance Notes/Cues   Gastroc incline  Hamstring stretches 2 x 30\"  2 x 30\"    Prone prop 2 min    PPU x10    Education     Tslide Rows             Ext             Lats             T-pull Green x10  Green x10  Green x10  Yellow x10                   HEP See below    Manual Intervention (41467) Min: 10'     Prone PA mobs 2'    STM 8' Minimal reports of tenderness   NMR re-education (16048)   Min: 10     Prone hip extension 10 x 5\" R/L    Prone trunk extension 10 x 5\"    clamshells x10 R/L    Bridge  Bridge w/ add  Bridge w/ abd 10 x 5\"  Add  Add     Therapeutic Activity (45995) Min:               Modalities  Min:             Other Therapeutic Activities:  Pt was educated on PT POC, Diagnosis, Prognosis, pathomechanics as well as frequency and duration of scheduling future physical therapy appointments. Time was also taken on this day to answer all patient questions and participation in PT. Reviewed appointment policy in detail with patient and patient verbalized understanding. Home Exercise Program:  Access Code: [email or web address]  URL: [email or web address]  Date: 05/18/2022  Prepared by: Juliane Lr  Exercises  Prone Press Up - 1 x daily - 7 x weekly - 10 reps - 10 hold  Seated Sciatic Tensioner - 1 x daily - 7 x weekly - 10 reps  Prone Hip Extension - Two Pillows - 1 x daily - 7 x weekly - 10 reps - 5 hold  Cat-Camel - 1 x daily - 7 x weekly - 10 reps    Therapeutic Exercise and NMR EXR  [] (10334) Provided verbal/tactile cueing for activities related to strengthening, flexibility, endurance, ROM  for improvements in proximal hip and core control with self care, mobility, lifting and ambulation.  [] (76110) Provided verbal/tactile cueing for activities related to improving balance, coordination, kinesthetic sense, posture, motor skill, proprioception  to assist with core control in self care, mobility, lifting, and ambulation. Therapeutic Activities:    [] (66970 or 52841) Provided verbal/tactile cueing for activities related to improving balance, coordination, kinesthetic sense, posture, motor skill, proprioception and motor activation to allow for proper function  with self care and ADLs  [] (66012) Provided training and instruction to the patient for proper core and proximal hip recruitment and positioning with ambulation re-education     Home Exercise Program:    [] (18269) Reviewed/Progressed HEP activities related to strengthening, flexibility, endurance, ROM of core, proximal hip and LE for functional self-care, mobility, lifting and ambulation   [] (81599) Reviewed/Progressed HEP activities related to improving balance, coordination, kinesthetic sense, posture, motor skill, proprioception of core, proximal hip and LE for self care, mobility, lifting, and ambulation      Manual Treatments:  PROM / STM / Oscillations-Mobs:  G-I, II, III, IV (PA's, Inf., Post.)  [] (06681) Provided manual therapy to mobilize proximal hip and LS spine soft tissue/joints for the purpose of modulating pain, promoting relaxation,  increasing ROM, reducing/eliminating soft tissue swelling/inflammation/restriction, improving soft tissue extensibility and allowing for proper ROM for normal function with self care, mobility, lifting and ambulation.        Charges:  Timed Code Treatment Minutes: 40   Total Treatment Minutes: 40     [] EVAL (LOW) 83224 (typically 20 minutes face-to-face)  [] EVAL (MOD) 18129 (typically 30 minutes face-to-face)  [] EVAL (HIGH) 16868 (typically 45 minutes face-to-face)  [] RE-EVAL     [x] HK(58361) x     [] Dry needle 1 or 2 Muscles (96481)  [x] NMR (93808) x     [] Dry needle 3+ Muscles (59050)  [x] Manual (16323) x     [] Ultrasound (64938) x  [] TA (35776) x     [] Mech Traction (30981)  [] ES(attended) (50900)     [] ES (un) (92525):   [] Vasopump (44710) [] Ionto (80014)   [] Other:    GOALS:  Patient stated goal:\"functioning without or with minimal pain\"  []? Progressing: []? Met: []? Not Met: []? Adjusted  Therapist goals for Patient:   Short Term Goals: To be achieved in: 2 weeks  1. Independent in HEP and progression per patient tolerance, in order to prevent re-injury. []? Progressing: []? Met: []? Not Met: []? Adjusted  2. Patient will have a decrease in pain to facilitate improvement in movement, function, and ADLs as indicated by Functional Deficits. []? Progressing: []? Met: []? Not Met: []? Adjusted     Long Term Goals: To be achieved in: 6 weeks  1. FOTO Lumbar will score 70 to assist with reaching prior level of function. []? Progressing: []? Met: []? Not Met: []? Adjusted  2. Patient will demonstrate increased AROM to WNL, good LS mobility, good hip ROM to allow for proper joint functioning as indicated by patients Functional Deficits. []? Progressing: []? Met: []? Not Met: []? Adjusted  3. Patient will demonstrate an increase in Strength to good proximal hip and core activation to allow for proper functional mobility as indicated by patients Functional Deficits. []? Progressing: []? Met: []? Not Met: []? Adjusted  4. Patient will return to [facility] without pain\" including donning socks without increased symptoms or restriction. []? Progressing: []? Met: []? Not Met: []? Adjusted  5. Patient will be able to stand 2 hours at work without aggravating pain. []? Progressing: []? Met: []? Not Met: []?  Adjusted     ASSESSMENT:  See eval    Treatment/Activity Tolerance:  [x] Patient tolerated treatment well [] Patient limited by fatique  [] Patient limited by pain  [] Patient limited by other medical complications  [x] Other:   6/6: pt tolerated session well. Occasional reports of \"pins and needles\" but it did not last. No reports of pain or NT after session    Overall Progression Towards Functional goals/ Treatment Progress Update:  [] Patient is progressing as expected towards functional goals listed. [] Progression is slowed due to complexities/Impairments listed. [] Progression has been slowed due to co-morbidities. [x] Plan just implemented, too soon to assess goals progression <30days   [] Goals require adjustment due to lack of progress  [] Patient is not progressing as expected and requires additional follow up with physician  [] Other:    Prognosis for POC: [x] Good [] Fair  [] Poor    Patient requires continued skilled intervention: [x] Yes  [] No        PLAN: See eval, begin prone PA mobs, consider STM, begin/progress prone progression and lumbar stab, dural tension activities; PN session # 5  [] Continue per plan of care [] Alter current plan (see comments)  [x] Plan of care initiated [] Hold pending MD visit [] Discharge    Electronically signed by: Archana Chapman, PTA [id number]      Note: If patient does not return for scheduled/recommended follow up visits, this note will serve as a discharge from care along with the most recent update on progress.

## 2022-06-08 ENCOUNTER — HOSPITAL ENCOUNTER (OUTPATIENT)
Dept: PHYSICAL THERAPY | Age: 37
Setting detail: THERAPIES SERIES
Discharge: HOME | End: 2022-06-08
Payer: COMMERCIAL

## 2022-06-08 ENCOUNTER — APPOINTMENT (OUTPATIENT)
Dept: PHYSICAL THERAPY | Age: 37
End: 2022-06-08
Payer: COMMERCIAL

## 2022-06-08 PROCEDURE — 97110 THERAPEUTIC EXERCISES: CPT

## 2022-06-08 PROCEDURE — 97140 MANUAL THERAPY 1/> REGIONS: CPT

## 2022-06-08 PROCEDURE — 97112 NEUROMUSCULAR REEDUCATION: CPT

## 2022-06-08 NOTE — FLOWSHEET NOTE
[facility]  Outpatient Rehabilitation and Therapy, [facility] [address] [place] [address]  Phone: [phone]   Fax:     [phone]    Physical Therapy Treatment Note/ Progress Report:     Date:  2022    Patient Name:  Jesús Montanez    :  1985  MRN: [id number]    Pertinent Medical History:      Medical/Treatment Diagnosis Information:  · Diagnosis: Lumbar radiculopathy (M54.16)  · Treatment Diagnosis: Decreased ADL status (B64.2)    Insurance/Certification information:  PT Insurance Information: [employer]  Physician Information:  Dr. Elizabeth Velasquez of care signed (Y/N): Y    Date of Patient follow up with Physician:      Progress Report: []  Yes  [x]  No     Date Range for reporting period:  Beginnin22  Ending:    Progress report due (10 Rx/or 30 days whichever is less):     Recertification due (POC duration/ or 90 days whichever is less):     Visit # POC/ Insurance Allowable Auth Needed after 30   3 12 []Yes    [x]No     Pain level:  1-5/10   Functional Scale: FOTO Lumbar = 22 57    History of Injury:Pt had COVID in January and pain started when he was sedentary, lying on a . Patient stated over the past 4-5 months he has had low back pain, L buttock, L posterolateral thigh, posterior calf pain, described as a dull pain or at times a tingling. Pain sometimes affects his ability to don socks, standing a lot at work, bending activities aggravate pain. Pt has increase in pain with sneezing. SUBJECTIVE:  See eval  : Sore after eval, but has been feeling a little better since. Has not been doing his exercises as much as he should be. Sometimes they would help, other times they seemed to make it worse (in the leg). Not too bad right now  22 pt stated STM helped last PT session. Pt has pain post L thigh and L calf today. PT offered DN. Pt stated \"I'm not too big on needles. \"  Pt stated he used to get posterior LE pain daily, \"but now its just hit or miss, and its very mild. \"    Pt no longer has difficulty donning socks unless its the early morning. OBJECTIVE:    Observation:    5/18/22  Joint mobility: pain L unilateral L4/5 grade IV           Geovanna (-)    6/8/22  Joint mobility: no longer had pain L unilateral L4/5 grade IV  Tenderness: no lumbar tenderness. SKTC:  75 seconds, no L LE pain produced  Piriformis stretch: 60 seconds, no L LE pain produced  Repetitive flexion: 10 reps, no change in pain     Test measurements:    ROM  5/18/22 6/8/22   Lumbar Flex Decreased 25%, L thigh pain Decreased 25%, L post thigh pain    Lumbar Ext Decreased 50%, no pain  WFL, no pain      ROM LEFT  5/18/22 RIGHT  5/18/22 6/8/22   Lumbar Side Bend WFL, L calf pain WFL, no pain L     Lumbar Rotation         Quadrant (+)- L buttock pain (-)       Slump test  (+)  (-) L          RESTRICTIONS/PRECAUTIONS:     Exercises/Interventions:     Therapeutic Ex (04275)   Min: 20 Repetitions/Resistance Notes/Cues   Gastroc incline  Hamstring stretches 2 x 30\"  2 x 30\"         PPU 10x, 10\" hold Eliminated L posterior thigh pain   Education     Tslide Rows             Ext             Lats             T-pull Green x10  Green x10  Green x10  Yellow x10    Dural slides for sciatic nerve 1. Supine, 90/90 ankle pumps, 30x  2.  Sitting, 20x              HEP See below    Manual Intervention (96506) Min: 10'     Prone PA mobs L unilateral L5/S1 grade IV, caused L calf pain (minimal), so stopped    STM  L piriformis 10' In R SL   NMR re-education (87377)   Min: 10     Prone hip extension 10 x 5\" R/L    Prone trunk extension 10 x 5\"    Prone heel squeeze 10x, 5\" hold    Bridge w/ add  Bridge w/ abd 10 x 5\"  10x, 5\" hold     Therapeutic Activity (47647) Min:               Modalities  Min:             Other Therapeutic Activities:  Pt was educated on PT POC, Diagnosis, Prognosis, pathomechanics as well as frequency and duration of scheduling future physical therapy appointments. Time was also taken on this day to answer all patient questions and participation in PT. Reviewed appointment policy in detail with patient and patient verbalized understanding. Home Exercise Program:  Access Code: [email or web address]  URL: [email or web address]/  Date: 06/08/2022  Prepared by: Martha Villagomez    Exercises  Prone Press Up - 1 x daily - 7 x weekly - 10 reps - 10 hold  Prone Hip Extension - Two Pillows - 1 x daily - 7 x weekly - 10 reps - 5 hold  Cat-Camel - 1 x daily - 7 x weekly - 10 reps  Prone Heel Squeeze - 1 x daily - 7 x weekly - 10 reps - 5 hold  Superman on Table - 1 x daily - 7 x weekly - 10 reps - 5 hold  Supine Piriformis Stretch with Leg Straight - 1 x daily - 7 x weekly - 1 reps - 30 hold  Standing Gastroc Stretch - 1 x daily - 7 x weekly - 3 reps - 30 hold        Therapeutic Exercise and NMR EXR  [] (10629) Provided verbal/tactile cueing for activities related to strengthening, flexibility, endurance, ROM  for improvements in proximal hip and core control with self care, mobility, lifting and ambulation.  [] (97620) Provided verbal/tactile cueing for activities related to improving balance, coordination, kinesthetic sense, posture, motor skill, proprioception  to assist with core control in self care, mobility, lifting, and ambulation.      Therapeutic Activities:    [] (21411 or 83798) Provided verbal/tactile cueing for activities related to improving balance, coordination, kinesthetic sense, posture, motor skill, proprioception and motor activation to allow for proper function  with self care and ADLs  [] (70193) Provided training and instruction to the patient for proper core and proximal hip recruitment and positioning with ambulation re-education     Home Exercise Program:    [] (85530) Reviewed/Progressed HEP activities related to strengthening, flexibility, endurance, ROM of core, proximal hip and LE for functional self-care, mobility, lifting and ambulation   [] (20055) Reviewed/Progressed HEP activities related to improving balance, coordination, kinesthetic sense, posture, motor skill, proprioception of core, proximal hip and LE for self care, mobility, lifting, and ambulation      Manual Treatments:  PROM / STM / Oscillations-Mobs:  G-I, II, III, IV (PA's, Inf., Post.)  [] (50854) Provided manual therapy to mobilize proximal hip and LS spine soft tissue/joints for the purpose of modulating pain, promoting relaxation,  increasing ROM, reducing/eliminating soft tissue swelling/inflammation/restriction, improving soft tissue extensibility and allowing for proper ROM for normal function with self care, mobility, lifting and ambulation. Charges:  Timed Code Treatment Minutes: 40   Total Treatment Minutes: 40     [] EVAL (LOW) 66778 (typically 20 minutes face-to-face)  [] EVAL (MOD) 81483 (typically 30 minutes face-to-face)  [] EVAL (HIGH) 09599 (typically 45 minutes face-to-face)  [] RE-EVAL     [x] CL(05296) x     [] Dry needle 1 or 2 Muscles (28037)  [x] NMR (71758) x     [] Dry needle 3+ Muscles (23376)  [x] Manual (51722) x     [] Ultrasound (31237) x  [] TA (13091) x     [] Mech Traction (14750)  [] ES(attended) (23718)     [] ES (un) (17804):   [] Vasopump (10749) [] Ionto (82446)   [] Other:    GOALS:  Patient stated goal:\"functioning without or with minimal pain\"  []? Progressing: []? Met: []? Not Met: []? Adjusted  Therapist goals for Patient:   Short Term Goals: To be achieved in: 2 weeks  1. Independent in HEP and progression per patient tolerance, in order to prevent re-injury. []? Progressing: []? Met: []? Not Met: []? Adjusted  2. Patient will have a decrease in pain to facilitate improvement in movement, function, and ADLs as indicated by Functional Deficits. []? Progressing: []? Met: []? Not Met: []? Adjusted     Long Term Goals: To be achieved in: 6 weeks  1. FOTO Lumbar will score 70 to assist with reaching prior level of function. []? Progressing: []? Met: []? Not Met: []? Adjusted  2. Patient will demonstrate increased AROM to WNL, good LS mobility, good hip ROM to allow for proper joint functioning as indicated by patients Functional Deficits. []? Progressing: []? Met: []? Not Met: []? Adjusted  3. Patient will demonstrate an increase in Strength to good proximal hip and core activation to allow for proper functional mobility as indicated by patients Functional Deficits. []? Progressing: []? Met: []? Not Met: []? Adjusted  4. Patient will return to [facility] without pain\" including donning socks without increased symptoms or restriction. []? Progressing: []? Met: []? Not Met: []? Adjusted  5. Patient will be able to stand 2 hours at work without aggravating pain. []? Progressing: []? Met: []? Not Met: []? Adjusted     ASSESSMENT:  See eval    Treatment/Activity Tolerance:  [x] Patient tolerated treatment well [] Patient limited by fatique  [] Patient limited by pain  [] Patient limited by other medical complications  [x] Other:   6/6: pt tolerated session well. Occasional reports of \"pins and needles\" but it did not last. No reports of pain or NT after session    Overall Progression Towards Functional goals/ Treatment Progress Update:  [] Patient is progressing as expected towards functional goals listed. [] Progression is slowed due to complexities/Impairments listed. [] Progression has been slowed due to co-morbidities.   [x] Plan just implemented, too soon to assess goals progression <30days   [] Goals require adjustment due to lack of progress  [] Patient is not progressing as expected and requires additional follow up with physician  [] Other:    Prognosis for POC: [x] Good [] Fair  [] Poor    Patient requires continued skilled intervention: [x] Yes  [] No        PLAN: See eval, begin prone PA mobs, consider STM, begin/progress prone progression and lumbar stab, dural tension activities; PN session # 5  [] Continue per plan of care [] Alter current plan (see comments)  [x] Plan of care initiated [] Hold pending MD visit [] Discharge    Electronically signed by: Juan Jose Mosher, PT [id number]      Note: If patient does not return for scheduled/recommended follow up visits, this note will serve as a discharge from care along with the most recent update on progress.

## 2022-06-13 ENCOUNTER — HOSPITAL ENCOUNTER (OUTPATIENT)
Dept: PHYSICAL THERAPY | Age: 37
Setting detail: THERAPIES SERIES
Discharge: HOME | End: 2022-06-13
Payer: COMMERCIAL

## 2022-06-13 ENCOUNTER — APPOINTMENT (OUTPATIENT)
Dept: PHYSICAL THERAPY | Age: 37
End: 2022-06-13
Payer: COMMERCIAL

## 2022-06-13 PROCEDURE — 97110 THERAPEUTIC EXERCISES: CPT

## 2022-06-13 PROCEDURE — 97112 NEUROMUSCULAR REEDUCATION: CPT

## 2022-06-13 PROCEDURE — 97140 MANUAL THERAPY 1/> REGIONS: CPT

## 2022-06-13 NOTE — FLOWSHEET NOTE
[place] [facility] [place] [address]. [place] [address]  Phone: [phone]   Fax:     [phone]    Physical Therapy Treatment Note/ Progress Report:     Date:  2022    Patient Name:  Rajeev Reynoso    :  1985  MRN: [id number]    Pertinent Medical History:      Medical/Treatment Diagnosis Information:  · Diagnosis: Lumbar radiculopathy (M54.16)  · Treatment Diagnosis: Decreased ADL status (L41.4)    Insurance/Certification information:  PT Insurance Information: R  Physician Information:  Dr. Miller Rivas of care signed (Y/N): Y    Date of Patient follow up with Physician:      Progress Report: []  Yes  [x]  No     Date Range for reporting period:  Beginnin22  Ending:    Progress report due (10 Rx/or 30 days whichever is less):     Recertification due (POC duration/ or 90 days whichever is less):     Visit # POC/ Insurance Allowable Auth Needed after 30   4 12 []Yes    [x]No     Pain level:  1-5/10   Functional Scale: FOTO Lumbar = 22 57    History of Injury:Pt had COVID in January and pain started when he was sedentary, lying on a . Patient stated over the past 4-5 months he has had low back pain, L buttock, L posterolateral thigh, posterior calf pain, described as a dull pain or at times a tingling. Pain sometimes affects his ability to don socks, standing a lot at work, bending activities aggravate pain. Pt has increase in pain with sneezing. SUBJECTIVE:  See eval  : Sore after eval, but has been feeling a little better since. Has not been doing his exercises as much as he should be. Sometimes they would help, other times they seemed to make it worse (in the leg). Not too bad right now  22 pt stated STM helped last PT session. Pt has pain post L thigh and L calf today. PT offered DN. Pt stated \"I'm not too big on needles. \"  Pt stated he used to get posterior LE pain daily, \"but now its just hit or miss, and its very mild. \"    Pt no longer has difficulty donning socks unless its the early morning. 6/13: Leg has been doing great  OBJECTIVE:    Observation:    5/18/22  Joint mobility: pain L unilateral L4/5 grade IV           Geovanna (-)    6/8/22  Joint mobility: no longer had pain L unilateral L4/5 grade IV  Tenderness: no lumbar tenderness. SKTC:  75 seconds, no L LE pain produced  Piriformis stretch: 60 seconds, no L LE pain produced  Repetitive flexion: 10 reps, no change in pain     Test measurements:    ROM  5/18/22 6/8/22   Lumbar Flex Decreased 25%, L thigh pain Decreased 25%, L post thigh pain    Lumbar Ext Decreased 50%, no pain  WFL, no pain      ROM LEFT  5/18/22 RIGHT  5/18/22 6/8/22   Lumbar Side Bend WFL, L calf pain WFL, no pain L     Lumbar Rotation         Quadrant (+)- L buttock pain (-)       Slump test  (+)  (-) L          RESTRICTIONS/PRECAUTIONS:     Exercises/Interventions:     Therapeutic Ex (58864)   Min: 20 Repetitions/Resistance Notes/Cues   Gastroc incline  Hamstring stretches 2 x 30\"  2 x 30\"         PPU 10x, 10\" hold Eliminated L posterior thigh pain   Education     Tslide Rows             Ext             Lats             T-pull Green 2x10  Green 2x10  Green 2x10  Yellow 2x10    Dural slides for sciatic nerve               HEP See below    Manual Intervention (95600) Min: 10'     Prone PA mobs     STM  L piriformis 10' In R SL   NMR re-education (45098)   Min: 10     Prone hip extension 10 x 5\" R/L    Prone trunk extension 10 x 5\"    Prone heel squeeze 10x, 5\" hold    Prone hip IR/ER  add   Q-ped UE/LE lifts  add   Bridge w/ add  Bridge w/ abd 10 x 5\"  10x, 5\" hold     Therapeutic Activity (42881) Min:               Modalities  Min:             Other Therapeutic Activities:  Pt was educated on PT POC, Diagnosis, Prognosis, pathomechanics as well as frequency and duration of scheduling future physical therapy appointments.  Time was also taken on this day to answer all patient questions and participation in PT. Reviewed appointment policy in detail with patient and patient verbalized understanding. Home Exercise Program:  Access Code: [email or web address]  URL: [email or web address]  Date: 06/08/2022  Prepared by: Laci Graft    Exercises  Prone Press Up - 1 x daily - 7 x weekly - 10 reps - 10 hold  Prone Hip Extension - Two Pillows - 1 x daily - 7 x weekly - 10 reps - 5 hold  Cat-Camel - 1 x daily - 7 x weekly - 10 reps  Prone Heel Squeeze - 1 x daily - 7 x weekly - 10 reps - 5 hold  Superman on Table - 1 x daily - 7 x weekly - 10 reps - 5 hold  Supine Piriformis Stretch with Leg Straight - 1 x daily - 7 x weekly - 1 reps - 30 hold  Standing Gastroc Stretch - 1 x daily - 7 x weekly - 3 reps - 30 hold        Therapeutic Exercise and NMR EXR  [] (15944) Provided verbal/tactile cueing for activities related to strengthening, flexibility, endurance, ROM  for improvements in proximal hip and core control with self care, mobility, lifting and ambulation.  [] (13326) Provided verbal/tactile cueing for activities related to improving balance, coordination, kinesthetic sense, posture, motor skill, proprioception  to assist with core control in self care, mobility, lifting, and ambulation.      Therapeutic Activities:    [] (56319 or 67523) Provided verbal/tactile cueing for activities related to improving balance, coordination, kinesthetic sense, posture, motor skill, proprioception and motor activation to allow for proper function  with self care and ADLs  [] (95775) Provided training and instruction to the patient for proper core and proximal hip recruitment and positioning with ambulation re-education     Home Exercise Program:    [] (22055) Reviewed/Progressed HEP activities related to strengthening, flexibility, endurance, ROM of core, proximal hip and LE for functional self-care, mobility, lifting and ambulation   [] (93145) Reviewed/Progressed HEP activities related to improving balance, coordination, kinesthetic sense, posture, motor skill, proprioception of core, proximal hip and LE for self care, mobility, lifting, and ambulation      Manual Treatments:  PROM / STM / Oscillations-Mobs:  G-I, II, III, IV (PA's, Inf., Post.)  [] (58667) Provided manual therapy to mobilize proximal hip and LS spine soft tissue/joints for the purpose of modulating pain, promoting relaxation,  increasing ROM, reducing/eliminating soft tissue swelling/inflammation/restriction, improving soft tissue extensibility and allowing for proper ROM for normal function with self care, mobility, lifting and ambulation. Charges:  Timed Code Treatment Minutes: 40   Total Treatment Minutes: 40     [] EVAL (LOW) 05487 (typically 20 minutes face-to-face)  [] EVAL (MOD) 18628 (typically 30 minutes face-to-face)  [] EVAL (HIGH) 02329 (typically 45 minutes face-to-face)  [] RE-EVAL     [x] UT(65981) x     [] Dry needle 1 or 2 Muscles (82953)  [x] NMR (67635) x     [] Dry needle 3+ Muscles (76119)  [x] Manual (84497) x     [] Ultrasound (10316) x  [] TA (79206) x     [] Mech Traction (86979)  [] ES(attended) (50299)     [] ES (un) (99809):   [] Vasopump (05389) [] Ionto (76401)   [] Other:    GOALS:  Patient stated goal:\"functioning without or with minimal pain\"  []? Progressing: []? Met: []? Not Met: []? Adjusted  Therapist goals for Patient:   Short Term Goals: To be achieved in: 2 weeks  1. Independent in HEP and progression per patient tolerance, in order to prevent re-injury. []? Progressing: []? Met: []? Not Met: []? Adjusted  2. Patient will have a decrease in pain to facilitate improvement in movement, function, and ADLs as indicated by Functional Deficits. []? Progressing: []? Met: []? Not Met: []? Adjusted     Long Term Goals: To be achieved in: 6 weeks  1. FOTO Lumbar will score 70 to assist with reaching prior level of function. []? Progressing: []? Met: []?  Not Met: []? Adjusted  2. Patient will demonstrate increased AROM to WNL, good LS mobility, good hip ROM to allow for proper joint functioning as indicated by patients Functional Deficits. []? Progressing: []? Met: []? Not Met: []? Adjusted  3. Patient will demonstrate an increase in Strength to good proximal hip and core activation to allow for proper functional mobility as indicated by patients Functional Deficits. []? Progressing: []? Met: []? Not Met: []? Adjusted  4. Patient will return to [facility] without pain\" including donning socks without increased symptoms or restriction. []? Progressing: []? Met: []? Not Met: []? Adjusted  5. Patient will be able to stand 2 hours at work without aggravating pain. []? Progressing: []? Met: []? Not Met: []? Adjusted     ASSESSMENT:  See eval    Treatment/Activity Tolerance:  [x] Patient tolerated treatment well [] Patient limited by fatique  [] Patient limited by pain  [] Patient limited by other medical complications  [x] Other:   6/6: pt tolerated session well. Occasional reports of \"pins and needles\" but it did not last. No reports of pain or NT after session    Overall Progression Towards Functional goals/ Treatment Progress Update:  [] Patient is progressing as expected towards functional goals listed. [] Progression is slowed due to complexities/Impairments listed. [] Progression has been slowed due to co-morbidities.   [x] Plan just implemented, too soon to assess goals progression <30days   [] Goals require adjustment due to lack of progress  [] Patient is not progressing as expected and requires additional follow up with physician  [] Other:    Prognosis for POC: [x] Good [] Fair  [] Poor    Patient requires continued skilled intervention: [x] Yes  [] No        PLAN: See eval, begin prone PA mobs, consider STM, begin/progress prone progression and lumbar stab, dural tension activities; PN session # 5  [] Continue per plan of care [] Alter current plan (see comments)  [x] Plan of care initiated [] Hold pending MD visit [] Discharge    Electronically signed by: Debbie Valerio, PTA [id number]      Note: If patient does not return for scheduled/recommended follow up visits, this note will serve as a discharge from care along with the most recent update on progress.

## 2022-06-15 ENCOUNTER — APPOINTMENT (OUTPATIENT)
Dept: PHYSICAL THERAPY | Age: 37
End: 2022-06-15
Payer: COMMERCIAL

## 2022-06-15 ENCOUNTER — HOSPITAL ENCOUNTER (OUTPATIENT)
Dept: PHYSICAL THERAPY | Age: 37
Setting detail: THERAPIES SERIES
Discharge: HOME | End: 2022-06-15
Payer: COMMERCIAL

## 2022-06-15 PROCEDURE — 97140 MANUAL THERAPY 1/> REGIONS: CPT

## 2022-06-15 PROCEDURE — 97112 NEUROMUSCULAR REEDUCATION: CPT

## 2022-06-15 PROCEDURE — 97110 THERAPEUTIC EXERCISES: CPT

## 2022-06-15 NOTE — FLOWSHEET NOTE
[place] [facility] [place] [address]. [place] [address]  Phone: [phone]   Fax:     [phone]    Physical Therapy Treatment Note/ Progress Report:     Date:  6/15/2022    Patient Name:  Vargas Wolfe    :  1985  MRN: [id number]    Pertinent Medical History:      Medical/Treatment Diagnosis Information:  · Diagnosis: Lumbar radiculopathy (M54.16)  · Treatment Diagnosis: Decreased ADL status (L56.3)    Insurance/Certification information:  PT Insurance Information: UMR  Physician Information:  Dr. Membreno Ar of care signed (Y/N): Y    Date of Patient follow up with Physician:      Progress Report: []  Yes  [x]  No     Date Range for reporting period:  Beginnin22  Ending:    Progress report due (10 Rx/or 30 days whichever is less):     Recertification due (POC duration/ or 90 days whichever is less):     Visit # POC/ Insurance Allowable Auth Needed after 30   5 12 []Yes    [x]No     Pain level:  1-5/10   Functional Scale: FOTO Lumbar = 22 57    History of Injury:Pt had COVID in January and pain started when he was sedentary, lying on a . Patient stated over the past 4-5 months he has had low back pain, L buttock, L posterolateral thigh, posterior calf pain, described as a dull pain or at times a tingling. Pain sometimes affects his ability to don socks, standing a lot at work, bending activities aggravate pain. Pt has increase in pain with sneezing. SUBJECTIVE:  See eval  : Sore after eval, but has been feeling a little better since. Has not been doing his exercises as much as he should be. Sometimes they would help, other times they seemed to make it worse (in the leg). Not too bad right now  22 pt stated STM helped last PT session. Pt has pain post L thigh and L calf today. PT offered DN. Pt stated \"I'm not too big on needles. \"  Pt stated he used to get posterior LE pain daily, \"but now its just hit or miss, and its very mild. \"    Pt no longer has difficulty donning socks unless its the early morning. 6/13: Leg has been doing great  6/15: Doing well. Tolerated last session well. Has a little ache in his leg, but most is in his back and that isn't that bad    OBJECTIVE:    Observation:    5/18/22  Joint mobility: pain L unilateral L4/5 grade IV           Geovanna (-)    6/8/22  Joint mobility: no longer had pain L unilateral L4/5 grade IV  Tenderness: no lumbar tenderness.   SKTC:  75 seconds, no L LE pain produced  Piriformis stretch: 60 seconds, no L LE pain produced  Repetitive flexion: 10 reps, no change in pain     Test measurements:    ROM  5/18/22 6/8/22   Lumbar Flex Decreased 25%, L thigh pain Decreased 25%, L post thigh pain    Lumbar Ext Decreased 50%, no pain  WFL, no pain      ROM LEFT  5/18/22 RIGHT  5/18/22 6/8/22   Lumbar Side Bend WFL, L calf pain WFL, no pain L     Lumbar Rotation         Quadrant (+)- L buttock pain (-)       Slump test  (+)  (-) L          RESTRICTIONS/PRECAUTIONS:     Exercises/Interventions:     Therapeutic Ex (15735)   Min: 20 Repetitions/Resistance Notes/Cues   Gastroc incline  Hamstring stretches 2 x 30\"  2 x 30\"         PPU 10x, 10\" hold Eliminated L posterior thigh pain   Education     Tslide Rows             Ext             Lats             T-pull             [place] Green 2x10  Green 2x10  Green 2x10  Yellow 2x10         add   Dural slides for sciatic nerve               HEP See below    Manual Intervention (73830) Min: 10'     Prone PA mobs     STM  L piriformis 10' In R SL   NMR re-education (69896)   Min: 15     Prone hip extension HEP   Prone trunk extension HEP   Prone heel squeeze HEP   Prone hip IR/ER x10 R/L    Planks 2 x 10\"    Q-ped UE lifts             LE lifts             Opp UE/LE lifts             Fire hydrant x5 R/L  x5 R/L  x5 R/L  x5 R/L    Bridge w/ add  Bridge w/ abd 10 x 5\"  10x, 5\" hold Therapeutic Activity (87056) Min:               Modalities  Min:             Other Therapeutic Activities:  Pt was educated on PT POC, Diagnosis, Prognosis, pathomechanics as well as frequency and duration of scheduling future physical therapy appointments. Time was also taken on this day to answer all patient questions and participation in PT. Reviewed appointment policy in detail with patient and patient verbalized understanding. Home Exercise Program:  Access Code: [email or web address]  URL: [email or web address]  Date: 06/08/2022  Prepared by: Joseph Mabry    Exercises  Prone Press Up - 1 x daily - 7 x weekly - 10 reps - 10 hold  Prone Hip Extension - Two Pillows - 1 x daily - 7 x weekly - 10 reps - 5 hold  Cat-Camel - 1 x daily - 7 x weekly - 10 reps  Prone Heel Squeeze - 1 x daily - 7 x weekly - 10 reps - 5 hold  Superman on Table - 1 x daily - 7 x weekly - 10 reps - 5 hold  Supine Piriformis Stretch with Leg Straight - 1 x daily - 7 x weekly - 1 reps - 30 hold  Standing Gastroc Stretch - 1 x daily - 7 x weekly - 3 reps - 30 hold        Therapeutic Exercise and NMR EXR  [] (40158) Provided verbal/tactile cueing for activities related to strengthening, flexibility, endurance, ROM  for improvements in proximal hip and core control with self care, mobility, lifting and ambulation.  [] (93072) Provided verbal/tactile cueing for activities related to improving balance, coordination, kinesthetic sense, posture, motor skill, proprioception  to assist with core control in self care, mobility, lifting, and ambulation.      Therapeutic Activities:    [] (86092 or 36118) Provided verbal/tactile cueing for activities related to improving balance, coordination, kinesthetic sense, posture, motor skill, proprioception and motor activation to allow for proper function  with self care and ADLs  [] (18833) Provided training and instruction to the patient for proper core and proximal hip recruitment and positioning with ambulation re-education     Home Exercise Program:    [] (74362) Reviewed/Progressed HEP activities related to strengthening, flexibility, endurance, ROM of core, proximal hip and LE for functional self-care, mobility, lifting and ambulation   [] (11457) Reviewed/Progressed HEP activities related to improving balance, coordination, kinesthetic sense, posture, motor skill, proprioception of core, proximal hip and LE for self care, mobility, lifting, and ambulation      Manual Treatments:  PROM / STM / Oscillations-Mobs:  G-I, II, III, IV (PA's, Inf., Post.)  [] (12830) Provided manual therapy to mobilize proximal hip and LS spine soft tissue/joints for the purpose of modulating pain, promoting relaxation,  increasing ROM, reducing/eliminating soft tissue swelling/inflammation/restriction, improving soft tissue extensibility and allowing for proper ROM for normal function with self care, mobility, lifting and ambulation. Charges:  Timed Code Treatment Minutes: 45   Total Treatment Minutes: 45     [] EVAL (LOW) 15383 (typically 20 minutes face-to-face)  [] EVAL (MOD) 16634 (typically 30 minutes face-to-face)  [] EVAL (HIGH) 75210 (typically 45 minutes face-to-face)  [] RE-EVAL     [x] VX(23721) x     [] Dry needle 1 or 2 Muscles (31203)  [x] NMR (11129) x     [] Dry needle 3+ Muscles (96792)  [x] Manual (92509) x     [] Ultrasound (14331) x  [] TA (88261) x     [] Mech Traction (56816)  [] ES(attended) (00560)     [] ES (un) (96761):   [] Vasopump (72824) [] Ionto (51000)   [] Other:    GOALS:  Patient stated goal:\"functioning without or with minimal pain\"  []? Progressing: []? Met: []? Not Met: []? Adjusted  Therapist goals for Patient:   Short Term Goals: To be achieved in: 2 weeks  1. Independent in HEP and progression per patient tolerance, in order to prevent re-injury. []? Progressing: []? Met: []? Not Met: []? Adjusted  2.  Patient will have a decrease in pain to facilitate improvement in movement, Poor    Patient requires continued skilled intervention: [x] Yes  [] No        PLAN: See eval, begin prone PA mobs, consider STM, begin/progress prone progression and lumbar stab, dural tension activities; PN session # 5  [] Continue per plan of care [] Alter current plan (see comments)  [x] Plan of care initiated [] Hold pending MD visit [] Discharge    Electronically signed by: Atilio Aguilar, PTA [id number]      Note: If patient does not return for scheduled/recommended follow up visits, this note will serve as a discharge from care along with the most recent update on progress.

## 2022-06-20 ENCOUNTER — APPOINTMENT (OUTPATIENT)
Dept: PHYSICAL THERAPY | Age: 37
End: 2022-06-20
Payer: COMMERCIAL

## 2022-06-20 ENCOUNTER — HOSPITAL ENCOUNTER (OUTPATIENT)
Dept: PHYSICAL THERAPY | Age: 37
Setting detail: THERAPIES SERIES
Discharge: HOME | End: 2022-06-20
Payer: COMMERCIAL

## 2022-06-20 PROCEDURE — 97110 THERAPEUTIC EXERCISES: CPT

## 2022-06-20 PROCEDURE — 97140 MANUAL THERAPY 1/> REGIONS: CPT

## 2022-06-20 PROCEDURE — G0283 ELEC STIM OTHER THAN WOUND: HCPCS

## 2022-06-20 NOTE — FLOWSHEET NOTE
[place] [facility] [place] [address]. [place] [address]  Phone: [phone]   Fax:     [phone]    Physical Therapy Treatment Note/ Progress Report:     Date:  2022    Patient Name:  Jose Maria Pope    :  1985  MRN: [id number]    Pertinent Medical History:      Medical/Treatment Diagnosis Information:  · Diagnosis: Lumbar radiculopathy (M54.16)  · Treatment Diagnosis: Decreased ADL status (R95.7)    Insurance/Certification information:  PT Insurance Information: UMR  Physician Information:  Dr. Italo Manning of care signed (Y/N): Y    Date of Patient follow up with Physician:      Progress Report: []  Yes  [x]  No     Date Range for reporting period:  Beginnin22  Ending:    Progress report due (10 Rx/or 30 days whichever is less):     Recertification due (POC duration/ or 90 days whichever is less):     Visit # POC/ Insurance Allowable Auth Needed after 30   6 12 []Yes    [x]No     Pain level:  1-5/10   Functional Scale: FOTO Lumbar = 22 57    History of Injury:Pt had COVID in January and pain started when he was sedentary, lying on a . Patient stated over the past 4-5 months he has had low back pain, L buttock, L posterolateral thigh, posterior calf pain, described as a dull pain or at times a tingling. Pain sometimes affects his ability to don socks, standing a lot at work, bending activities aggravate pain. Pt has increase in pain with sneezing. SUBJECTIVE:  See eval  : Sore after eval, but has been feeling a little better since. Has not been doing his exercises as much as he should be. Sometimes they would help, other times they seemed to make it worse (in the leg). Not too bad right now  22 pt stated STM helped last PT session. Pt has pain post L thigh and L calf today. PT offered DN. Pt stated \"I'm not too big on needles. \"  Pt stated he used to get posterior LE pain daily, \"but now its just hit or miss, and its very mild. \"    Pt no longer has difficulty donning socks unless its the early morning. 6/13: Leg has been doing great  6/15: Doing well. Tolerated last session well. Has a little ache in his leg, but most is in his back and that isn't that bad  6/20: Not sure how well he will do today. Aggravated his leg this weekend and had to leave work early yesterday. Pain is shooting all the way down his L leg to his foot. Was so bad yesterday he got dizzy. Hasn't been able to stand up too long. Has not been able to get in a comfortable position and has not been able to do prone press ups because they hurt too much. Sitting in a flexed position gave him some relief    OBJECTIVE:    Observation:    5/18/22  Joint mobility: pain L unilateral L4/5 grade IV           Geovanna (-)    6/8/22  Joint mobility: no longer had pain L unilateral L4/5 grade IV  Tenderness: no lumbar tenderness.   SKTC:  75 seconds, no L LE pain produced  Piriformis stretch: 60 seconds, no L LE pain produced  Repetitive flexion: 10 reps, no change in pain     Test measurements:    ROM  5/18/22 6/8/22   Lumbar Flex Decreased 25%, L thigh pain Decreased 25%, L post thigh pain    Lumbar Ext Decreased 50%, no pain  WFL, no pain      ROM LEFT  5/18/22 RIGHT  5/18/22 6/8/22   Lumbar Side Bend WFL, L calf pain WFL, no pain L     Lumbar Rotation         Quadrant (+)- L buttock pain (-)       Slump test  (+)  (-) L          RESTRICTIONS/PRECAUTIONS:     Exercises/Interventions:     Therapeutic Ex (21294)   Min: 30 Repetitions/Resistance Notes/Cues   Gastroc incline  Hamstring stretches 2 x 30\"  2 x 30\"         PPU 10x, 10\" hold Eliminated L posterior thigh pain   Stretch hip flexors, prone 3x30\" L/R    Tslide Rows             Ext             Lats             T-pull             Paloffs          add   Lateral shift vs wall to correct R lateral shift 10x, 10\" holds              HEP See below    Manual Intervention (62368) Min: 15'          Corrected R lateral shift 5'              STM  L piriformis 10' In R SL   NMR re-education (84892)   Min: 15     Prone hip extension HEP   Prone trunk extension HEP   Prone heel squeeze HEP   Prone hip IR/ER    Planks    Q-ped UE lifts             LE lifts             Opp UE/LE lifts             Fire hydrant    Bridge w/ add  Bridge w/ abd    Therapeutic Activity (34326) Min:               Modalities  Min:      IFC with HP Lumbar/R buttock 20'      Other Therapeutic Activities:  Pt was educated on PT POC, Diagnosis, Prognosis, pathomechanics as well as frequency and duration of scheduling future physical therapy appointments. Time was also taken on this day to answer all patient questions and participation in PT. Reviewed appointment policy in detail with patient and patient verbalized understanding. Home Exercise Program:  Access Code: [email or web address]  URL: [email or web address]  Date: 06/08/2022  Prepared by: Brian Ramsey    Exercises  Prone Press Up - 1 x daily - 7 x weekly - 10 reps - 10 hold  Prone Hip Extension - Two Pillows - 1 x daily - 7 x weekly - 10 reps - 5 hold  Cat-Camel - 1 x daily - 7 x weekly - 10 reps  Prone Heel Squeeze - 1 x daily - 7 x weekly - 10 reps - 5 hold  Superman on Table - 1 x daily - 7 x weekly - 10 reps - 5 hold  Supine Piriformis Stretch with Leg Straight - 1 x daily - 7 x weekly - 1 reps - 30 hold  Standing Gastroc Stretch - 1 x daily - 7 x weekly - 3 reps - 30 hold        Therapeutic Exercise and NMR EXR  [] (69029) Provided verbal/tactile cueing for activities related to strengthening, flexibility, endurance, ROM  for improvements in proximal hip and core control with self care, mobility, lifting and ambulation.  [] (42036) Provided verbal/tactile cueing for activities related to improving balance, coordination, kinesthetic sense, posture, motor skill, proprioception  to assist with core control in self care, mobility, lifting, and ambulation. Therapeutic Activities:    [] (17307 or 13747) Provided verbal/tactile cueing for activities related to improving balance, coordination, kinesthetic sense, posture, motor skill, proprioception and motor activation to allow for proper function  with self care and ADLs  [] (98982) Provided training and instruction to the patient for proper core and proximal hip recruitment and positioning with ambulation re-education     Home Exercise Program:    [] (14769) Reviewed/Progressed HEP activities related to strengthening, flexibility, endurance, ROM of core, proximal hip and LE for functional self-care, mobility, lifting and ambulation   [] (29580) Reviewed/Progressed HEP activities related to improving balance, coordination, kinesthetic sense, posture, motor skill, proprioception of core, proximal hip and LE for self care, mobility, lifting, and ambulation      Manual Treatments:  PROM / STM / Oscillations-Mobs:  G-I, II, III, IV (PA's, Inf., Post.)  [] (05066) Provided manual therapy to mobilize proximal hip and LS spine soft tissue/joints for the purpose of modulating pain, promoting relaxation,  increasing ROM, reducing/eliminating soft tissue swelling/inflammation/restriction, improving soft tissue extensibility and allowing for proper ROM for normal function with self care, mobility, lifting and ambulation.        Charges:  Timed Code Treatment Minutes: 45   Total Treatment Minutes: 45     [] EVAL (LOW) 20085 (typically 20 minutes face-to-face)  [] EVAL (MOD) 63197 (typically 30 minutes face-to-face)  [] EVAL (HIGH) 78246 (typically 45 minutes face-to-face)  [] RE-EVAL     [x] PU(21443) x     [] Dry needle 1 or 2 Muscles (67047)  [x] NMR (40571) x     [] Dry needle 3+ Muscles (59956)  [x] Manual (22638) x     [] Ultrasound (30526) x  [] TA (89936) x     [] Mech Traction (10380)  [] ES(attended) (20140)     [] ES (un) (55775):   [] Vasopump (86461) [] Ionto (75936)   [] Other:    GOALS:  Patient stated goal:\"functioning without or with minimal pain\"  []? Progressing: []? Met: []? Not Met: []? Adjusted  Therapist goals for Patient:   Short Term Goals: To be achieved in: 2 weeks  1. Independent in HEP and progression per patient tolerance, in order to prevent re-injury. []? Progressing: []? Met: []? Not Met: []? Adjusted  2. Patient will have a decrease in pain to facilitate improvement in movement, function, and ADLs as indicated by Functional Deficits. []? Progressing: []? Met: []? Not Met: []? Adjusted     Long Term Goals: To be achieved in: 6 weeks  1. FOTO Lumbar will score 70 to assist with reaching prior level of function. []? Progressing: []? Met: []? Not Met: []? Adjusted  2. Patient will demonstrate increased AROM to WNL, good LS mobility, good hip ROM to allow for proper joint functioning as indicated by patients Functional Deficits. []? Progressing: []? Met: []? Not Met: []? Adjusted  3. Patient will demonstrate an increase in Strength to good proximal hip and core activation to allow for proper functional mobility as indicated by patients Functional Deficits. []? Progressing: []? Met: []? Not Met: []? Adjusted  4. Patient will return to [facility] without pain\" including donning socks without increased symptoms or restriction. []? Progressing: []? Met: []? Not Met: []? Adjusted  5. Patient will be able to stand 2 hours at work without aggravating pain. []? Progressing: []? Met: []? Not Met: []? Adjusted     ASSESSMENT:  6/20/22 pt had a flare up of pain from lying on his couch again, in L side lying. L LE symptoms resolved well today, will need to monitor volatility of pain    Treatment/Activity Tolerance:  [x] Patient tolerated treatment well [] Patient limited by fatique  [] Patient limited by pain  [] Patient limited by other medical complications  [x] Other:   6/6: pt tolerated session well.  Occasional reports of \"pins and needles\" but it did not last. No reports of pain or NT after session    Overall Progression Towards Functional goals/ Treatment Progress Update:  [] Patient is progressing as expected towards functional goals listed. [] Progression is slowed due to complexities/Impairments listed. [] Progression has been slowed due to co-morbidities. [x] Plan just implemented, too soon to assess goals progression <30days   [] Goals require adjustment due to lack of progress  [] Patient is not progressing as expected and requires additional follow up with physician  [] Other:    Prognosis for POC: [x] Good [] Fair  [] Poor    Patient requires continued skilled intervention: [x] Yes  [] No        PLAN: See eval, begin prone PA mobs, consider STM, begin/progress prone progression and lumbar stab, dural tension activities; PN session # 5  [] Continue per plan of care [] Alter current plan (see comments)  [x] Plan of care initiated [] Hold pending MD visit [] Discharge    Electronically signed by: Pardeep Grier, SHAHNAZ [id number]/ Yeison Thompson PT , OMT-C, [id number]        Note: If patient does not return for scheduled/recommended follow up visits, this note will serve as a discharge from care along with the most recent update on progress.

## 2022-06-22 ENCOUNTER — HOSPITAL ENCOUNTER (OUTPATIENT)
Dept: PHYSICAL THERAPY | Age: 37
Setting detail: THERAPIES SERIES
Discharge: HOME | End: 2022-06-22
Payer: COMMERCIAL

## 2022-06-22 ENCOUNTER — APPOINTMENT (OUTPATIENT)
Dept: PHYSICAL THERAPY | Age: 37
End: 2022-06-22
Payer: COMMERCIAL

## 2022-06-22 PROCEDURE — 97110 THERAPEUTIC EXERCISES: CPT

## 2022-06-22 PROCEDURE — G0283 ELEC STIM OTHER THAN WOUND: HCPCS

## 2022-06-22 PROCEDURE — 97140 MANUAL THERAPY 1/> REGIONS: CPT

## 2022-06-22 NOTE — FLOWSHEET NOTE
[place] [facility], [facility] [address] [place] [address]  Phone: [phone]   Fax:     [phone]       Physical Therapy Discharge Summary    Dear Dr. Mehrdad Huggins,    We had the pleasure of treating the following patient for physical therapy services at [address] [place] [address]. A summary of our findings can be found in the discharge summary below. If you have any questions or concerns regarding these findings, please do not hesitate to contact me at the office phone number above. Thank you for the referral.     Physician Signature:________________________________Date:__________________  By signing above (or electronic signature), therapists plan is approved by physician      Overall Response to Treatment:   []Patient is responding well to treatment and improvement is noted with regards  to goals   []Patient should continue to improve in reasonable time if they continue HEP   [x]Patient has plateaued and is no longer responding to skilled PT intervention    []Patient is getting worse and would benefit from return to referring MD   []Patient unable to adhere to initial POC   [x]Other: Pt was progressing well in PT until pt re-aggravated L LE and back pain and paresthesia when he fell asleep on his couch again (the original cause of initial complaints). Pt has HEP to continue which helps to reduce intensity of pain and reduce L LE paresthesia    Date range of Visits: 22-22  Total Visits: [address] [place] [address] and Therapy, [place] [address].  [place] [address]  Phone: [phone]   Fax:     [phone]    Physical Therapy Treatment Note/ Progress Report:     Date:  2022    Patient Name:  Kedar Macias    :  1985  MRN: [id number]    Pertinent Medical History:      Medical/Treatment Diagnosis Information:  · Diagnosis: Lumbar radiculopathy (M54.16)  · Treatment Diagnosis: Decreased ADL status (G47.4)    Insurance/Certification information:  PT Insurance Information: UMR  Physician Information:  Dr. Rocio Powers of care signed (Y/N): Y    Date of Patient follow up with Physician:      Progress Report: []  Yes  [x]  No     Date Range for reporting period:  Beginnin22  Ending:    Progress report due (10 Rx/or 30 days whichever is less):     Recertification due (POC duration/ or 90 days whichever is less):     Visit # POC/ Insurance Allowable Auth Needed after 30   7 12 []Yes    [x]No     Pain level:  1-5/10   Functional Scale: FOTO Lumbar = 22 57; 22 60    History of Injury:Pt had COVID in January and pain started when he was sedentary, lying on a . Patient stated over the past 4-5 months he has had low back pain, L buttock, L posterolateral thigh, posterior calf pain, described as a dull pain or at times a tingling. Pain sometimes affects his ability to don socks, standing a lot at work, bending activities aggravate pain. Pt has increase in pain with sneezing. SUBJECTIVE:  See eval  : Sore after eval, but has been feeling a little better since. Has not been doing his exercises as much as he should be. Sometimes they would help, other times they seemed to make it worse (in the leg). Not too bad right now  22 pt stated STM helped last PT session. Pt has pain post L thigh and L calf today. PT offered DN. Pt stated \"I'm not too big on needles. \"  Pt stated he used to get posterior LE pain daily, \"but now its just hit or miss, and its very mild. \"    Pt no longer has difficulty donning socks unless its the early morning. : Leg has been doing great  6/15: Doing well. Tolerated last session well. Has a little ache in his leg, but most is in his back and that isn't that bad  : Not sure how well he will do today.  Aggravated his leg this weekend and had to leave work early yesterday. Pain is shooting all the way down his L leg to his foot. Was so bad yesterday he got dizzy. Hasn't been able to stand up too long. Has not been able to get in a comfortable position and has not been able to do prone press ups because they hurt too much. Sitting in a flexed position gave him some relief  6/22/22 pt stated his back felt much better after last PT visit but his leg has \"still been hurting but its slowly getting better. \"  Pt has had L \"calf dull pain, at times numbness and tingling. \"    OBJECTIVE:    Observation:    5/18/22  Joint mobility: pain L unilateral L4/5 grade IV           Geovanna (-)    6/8/22  Joint mobility: no longer had pain L unilateral L4/5 grade IV  Tenderness: no lumbar tenderness.   SKTC:  75 seconds, no L LE pain produced  Piriformis stretch: 60 seconds, no L LE pain produced  Repetitive flexion: 10 reps, no change in pain   6/22/22  Joint mobility: no longer had pain L unilateral L4/5 grade IV; had pain central PA L4/5  Repetitive flexion: 10 reps, no change in pain  Tenderness: L piriformis     Test measurements:    ROM  5/18/22 6/8/22 6/22/22   Lumbar Flex Decreased 25%, L thigh pain Decreased 25%, L post thigh pain  Decreased 25%, no pain   Lumbar Ext Decreased 50%, no pain  WFL, no pain WFL, no pain      ROM LEFT  5/18/22 RIGHT  5/18/22 Left  6/22/22  Right  6/22/22   Lumbar Side Bend WFL, L calf pain WFL, no pain WFL, no pain WFL, no pain   Lumbar Rotation         Quadrant (+)- L buttock pain (-) (+), L buttock pain (-)     Slump test  (+)  (-) (+) (-)         RESTRICTIONS/PRECAUTIONS:     Exercises/Interventions:     Therapeutic Ex (20803)   Min: 30 Repetitions/Resistance Notes/Cues   Gastroc incline  Hamstring stretches 2 x 30\"  2 x 30\"         PPU 10x, 10\" hold Eliminated L posterior thigh pain   Stretch hip flexors, prone 3x30\" L/R    Tslide Rows             Ext             Lats             T-pull             Paloffs          add   Lateral shift vs wall to correct R lateral shift 10x, 10\" holds              HEP See below    Manual Intervention (66627) Min: 15'          Corrected R lateral shift           STM  L piriformis 10' In R SL   NMR re-education (34967)   Min: 15     Prone hip extension HEP   Prone trunk extension HEP   Prone heel squeeze HEP   Prone hip IR/ER    Planks    Q-ped UE lifts             LE lifts             Opp UE/LE lifts             Fire hydrant    Bridge w/ add  Bridge w/ abd    Therapeutic Activity (78196) Min:               Modalities  Min:      IFC with HP Lumbar/R buttock 20'      Other Therapeutic Activities:  Pt was educated on PT POC, Diagnosis, Prognosis, pathomechanics as well as frequency and duration of scheduling future physical therapy appointments. Time was also taken on this day to answer all patient questions and participation in PT. Reviewed appointment policy in detail with patient and patient verbalized understanding. Home Exercise Program:  Access Code: [email or web address]  URL: [email or web address]  Date: 06/08/2022  Prepared by: Yoselin Ley    Exercises  Prone Press Up - 1 x daily - 7 x weekly - 10 reps - 10 hold  Prone Hip Extension - Two Pillows - 1 x daily - 7 x weekly - 10 reps - 5 hold  Cat-Camel - 1 x daily - 7 x weekly - 10 reps  Prone Heel Squeeze - 1 x daily - 7 x weekly - 10 reps - 5 hold  Superman on Table - 1 x daily - 7 x weekly - 10 reps - 5 hold  Supine Piriformis Stretch with Leg Straight - 1 x daily - 7 x weekly - 1 reps - 30 hold  Standing Gastroc Stretch - 1 x daily - 7 x weekly - 3 reps - 30 hold        Therapeutic Exercise and NMR EXR  [] (77736) Provided verbal/tactile cueing for activities related to strengthening, flexibility, endurance, ROM  for improvements in proximal hip and core control with self care, mobility, lifting and ambulation.  [] (84790) Provided verbal/tactile cueing for activities related to improving balance, coordination, kinesthetic sense, posture, motor skill, proprioception  to assist with core control in self care, mobility, lifting, and ambulation. Therapeutic Activities:    [] (06096 or 71248) Provided verbal/tactile cueing for activities related to improving balance, coordination, kinesthetic sense, posture, motor skill, proprioception and motor activation to allow for proper function  with self care and ADLs  [] (64747) Provided training and instruction to the patient for proper core and proximal hip recruitment and positioning with ambulation re-education     Home Exercise Program:    [] (77239) Reviewed/Progressed HEP activities related to strengthening, flexibility, endurance, ROM of core, proximal hip and LE for functional self-care, mobility, lifting and ambulation   [] (53548) Reviewed/Progressed HEP activities related to improving balance, coordination, kinesthetic sense, posture, motor skill, proprioception of core, proximal hip and LE for self care, mobility, lifting, and ambulation      Manual Treatments:  PROM / STM / Oscillations-Mobs:  G-I, II, III, IV (PA's, Inf., Post.)  [] (40418) Provided manual therapy to mobilize proximal hip and LS spine soft tissue/joints for the purpose of modulating pain, promoting relaxation,  increasing ROM, reducing/eliminating soft tissue swelling/inflammation/restriction, improving soft tissue extensibility and allowing for proper ROM for normal function with self care, mobility, lifting and ambulation.        Charges:  Timed Code Treatment Minutes: 45   Total Treatment Minutes: 45     [] EVAL (LOW) 93805 (typically 20 minutes face-to-face)  [] EVAL (MOD) 86838 (typically 30 minutes face-to-face)  [] EVAL (HIGH) 54750 (typically 45 minutes face-to-face)  [] RE-EVAL     [x] RE(19013) x     [] Dry needle 1 or 2 Muscles (74383)  [x] NMR (56667) x     [] Dry needle 3+ Muscles (36001)  [x] Manual (20445) x     [] Ultrasound (43057) x  [] TA (59348) x     [] Mech Traction (23425)  [] ES(attended) (71287)     [] ES (un) (89937):   [] Vasopump (06020) [] Ionto (68072)   [] Other:    GOALS:  Patient stated goal:\"functioning without or with minimal pain\"  []? Progressing: []? Met: []? Not Met: []? Adjusted  Therapist goals for Patient:   Short Term Goals: To be achieved in: 2 weeks  1. Independent in HEP and progression per patient tolerance, in order to prevent re-injury. []? Progressing: []? Met: []? Not Met: []? Adjusted  2. Patient will have a decrease in pain to facilitate improvement in movement, function, and ADLs as indicated by Functional Deficits. []? Progressing: []? Met: []? Not Met: []? Adjusted     Long Term Goals: To be achieved in: 6 weeks  1. FOTO Lumbar will score 70 to assist with reaching prior level of function. []? Progressing: []? Met: []? Not Met: []? Adjusted  2. Patient will demonstrate increased AROM to WNL, good LS mobility, good hip ROM to allow for proper joint functioning as indicated by patients Functional Deficits. []? Progressing: []? Met: []? Not Met: []? Adjusted  3. Patient will demonstrate an increase in Strength to good proximal hip and core activation to allow for proper functional mobility as indicated by patients Functional Deficits. []? Progressing: []? Met: []? Not Met: []? Adjusted  4. Patient will return to [facility] without pain\" including donning socks without increased symptoms or restriction. []? Progressing: []? Met: []? Not Met: []? Adjusted  5. Patient will be able to stand 2 hours at work without aggravating pain. []? Progressing: []? Met: []? Not Met: []? Adjusted     ASSESSMENT:  6/20/22 pt had a flare up of pain from lying on his couch again, in L side lying. L LE symptoms resolved well today, will need to monitor volatility of pain    Treatment/Activity Tolerance:  [x] Patient tolerated treatment well [] Patient limited by fatique  [] Patient limited by pain  [] Patient limited by other medical complications  [x] Other:   6/6: pt tolerated session well.  Occasional reports of \"pins and needles\" but it did not last. No reports of pain or NT after session    Overall Progression Towards Functional goals/ Treatment Progress Update:  [] Patient is progressing as expected towards functional goals listed. [] Progression is slowed due to complexities/Impairments listed. [] Progression has been slowed due to co-morbidities. [x] Plan just implemented, too soon to assess goals progression <30days   [] Goals require adjustment due to lack of progress  [] Patient is not progressing as expected and requires additional follow up with physician  [] Other:    Prognosis for POC: [x] Good [] Fair  [] Poor    Patient requires continued skilled intervention: [x] Yes  [] No        PLAN: See eval, begin prone PA mobs, consider STM, begin/progress prone progression and lumbar stab, dural tension activities; PN session # 5  [] Continue per plan of care [] Alter current plan (see comments)  [x] Plan of care initiated [] Hold pending MD visit [] Discharge    Electronically signed by: Graciela Solares, PT [id number]/ Graciela Solares PT , OMT-C, [id number]        Note: If patient does not return for scheduled/recommended follow up visits, this note will serve as a discharge from care along with the most recent update on progress.

## 2022-07-29 RX ORDER — OMEPRAZOLE 40 MG/1
CAPSULE, DELAYED RELEASE ORAL
Qty: 30 CAPSULE | Refills: 1 | Status: SHIPPED | OUTPATIENT
Start: 2022-07-29 | End: 2022-10-27

## 2022-07-29 NOTE — TELEPHONE ENCOUNTER
Medication:   Requested Prescriptions     Pending Prescriptions Disp Refills    omeprazole (PRILOSEC) 40 MG delayed release capsule [Pharmacy Med Name: OMEPRAZOLE DR 40 MG CAPSULE] 30 capsule 1     Sig: TAKE ONE CAPSULE BY MOUTH EVERY MORNING BEFORE BREAKFAST          Patient Phone Number: [phone] (home)     Last appt: 5/5/2022   Next appt: Visit date not found    Last OARRS: No flowsheet data found.   PDMP Monitoring:    Last PDMP Leann Chavez as Reviewed [facility]):  Review User Review Instant Review Result          Preferred Pharmacy:   Daniela Dunham [phone] [place] [phone] [place], [place] [address] [phone]  [address] [place] [address]  Phone: [phone] Fax: [phone]

## 2022-10-26 NOTE — TELEPHONE ENCOUNTER
Medication:   Requested Prescriptions     Pending Prescriptions Disp Refills    omeprazole (PRILOSEC) 40 MG delayed release capsule [Pharmacy Med Name: OMEPRAZOLE DR 40 MG CAPSULE] 30 capsule 1     Sig: TAKE ONE CAPSULE BY MOUTH EVERY MORNING BEFORE BREAKFAST      Last Filled:      Patient Phone Number: [phone] (home)     Last appt:   Next appt: Visit date not found    Last OARRS: No flowsheet data found.   PDMP Monitoring:    Last PDMP Tai Hughes as Reviewed [facility]):  Review User Review Instant Review Result          Preferred Pharmacy:   Ivy Brock [phone] [address] [phone] [facility] [phone]  [address] [place] [address]  Phone: [phone] Fax: [phone]

## 2022-10-27 RX ORDER — OMEPRAZOLE 40 MG/1
CAPSULE, DELAYED RELEASE ORAL
Qty: 30 CAPSULE | Refills: 5 | Status: SHIPPED | OUTPATIENT
Start: 2022-10-27

## 2023-01-17 NOTE — TELEPHONE ENCOUNTER
Medication:   Requested Prescriptions     Pending Prescriptions Disp Refills    fluticasone (FLONASE) 50 MCG/ACT nasal spray 16 g 12     Si sprays by Nasal route daily Both Nostrils    sertraline (ZOLOFT) 50 MG tablet 135 tablet 3     Sig: Take 1.5 tablets by mouth daily          Patient Phone Number: [phone] (home)     Last appt: 2022   Next appt: Visit date not found    Last OARRS: No flowsheet data found.   PDMP Monitoring:    Last PDMP Sharlene Jacques as Reviewed [facility]):  Review User Review Instant Review Result          Preferred Pharmacy:   [facility] [id number] [place] [phone] Sarahi Smith [phone]  [address] [place] [address]  Phone: [phone] Fax: [phone]

## 2023-01-18 RX ORDER — FLUTICASONE PROPIONATE 50 MCG
2 SPRAY, SUSPENSION (ML) NASAL DAILY
Qty: 16 G | Refills: 12 | Status: SHIPPED | OUTPATIENT
Start: 2023-01-18

## 2023-05-30 RX ORDER — OMEPRAZOLE 40 MG/1
CAPSULE, DELAYED RELEASE ORAL
Qty: 30 CAPSULE | Refills: 5 | OUTPATIENT
Start: 2023-05-30

## 2023-06-19 RX ORDER — OMEPRAZOLE 40 MG/1
CAPSULE, DELAYED RELEASE ORAL
Qty: 30 CAPSULE | Refills: 5 | OUTPATIENT
Start: 2023-06-19

## 2023-06-19 NOTE — TELEPHONE ENCOUNTER
Medication:   Requested Prescriptions     Pending Prescriptions Disp Refills    omeprazole (PRILOSEC) 40 MG delayed release capsule [Pharmacy Med Name: OMEPRAZOLE DR 40 MG CAPSULE] 30 capsule 5     Sig: TAKE ONE CAPSULE BY MOUTH EVERY MORNING BEFORE BREAKFAST      Provider out of office. Needs appt. Patient Phone Number: [phone] (home)     Last appt: 5/5/2022   Next appt: Visit date not found    Last OARRS: No flowsheet data found.   PDMP Monitoring:    Last PDMP Ace Corners as Reviewed [facility]):  Review User Review Instant Review Result          Preferred Pharmacy:   [facility] [id number] [address] [phone] [facility] [phone]  [address] [place] [address]  Phone: [phone] Fax: [phone]

## 2023-06-21 RX ORDER — OMEPRAZOLE 40 MG/1
40 CAPSULE, DELAYED RELEASE ORAL
Qty: 30 CAPSULE | Refills: 5 | OUTPATIENT
Start: 2023-06-21

## 2023-06-21 NOTE — TELEPHONE ENCOUNTER
Medication:   Requested Prescriptions     Pending Prescriptions Disp Refills    omeprazole (PRILOSEC) 40 MG delayed release capsule 30 capsule 5     Sig: Take 1 capsule by mouth every morning (before breakfast)      Last Filled:      Patient Phone Number: [phone] (home)     Last appt: 5/5/2022   Next appt: Visit date not found    Last OARRS: No flowsheet data found.   PDMP Monitoring:    Last PDMP Duke Yen as Reviewed [facility]):  Review User Review Instant Review Result          Preferred Pharmacy:   [facility] [phone] [address] [phone] [facility] [phone]  1 [facility]  [address]  Phone: [phone] Fax: [phone]

## 2023-06-26 RX ORDER — OMEPRAZOLE 40 MG/1
CAPSULE, DELAYED RELEASE ORAL
Qty: 30 CAPSULE | Refills: 5 | OUTPATIENT
Start: 2023-06-26

## 2023-06-26 RX ORDER — OMEPRAZOLE 40 MG/1
40 CAPSULE, DELAYED RELEASE ORAL
Qty: 30 CAPSULE | Refills: 5 | OUTPATIENT
Start: 2023-06-26

## 2023-06-26 NOTE — TELEPHONE ENCOUNTER
Medication:   Requested Prescriptions     Pending Prescriptions Disp Refills    omeprazole (PRILOSEC) 40 MG delayed release capsule [Pharmacy Med Name: OMEPRAZOLE DR 40 MG CAPSULE] 30 capsule 5     Sig: TAKE ONE CAPSULE BY MOUTH EVERY MORNING BEFORE BREAKFAST          Patient Phone Number: [phone] (home)     Last appt: 5/5/2022   Next appt: Visit date not found    Last OARRS: No flowsheet data found.   PDMP Monitoring:    Last PDMP Ralph Pineda as Reviewed [facility]):  Review User Review Instant Review Result          Preferred Pharmacy:   [facility] [id number] - [address] [phone] Myesha Cornejo [phone]  1 [facility]  [address]  Phone: [phone] Fax: [phone]

## 2023-06-26 NOTE — TELEPHONE ENCOUNTER
Medication:   Requested Prescriptions     Pending Prescriptions Disp Refills    omeprazole (PRILOSEC) 40 MG delayed release capsule [Pharmacy Med Name: OMEPRAZOLE DR 40 MG CAPSULE] 30 capsule 5     Sig: TAKE ONE CAPSULE BY MOUTH EVERY MORNING BEFORE BREAKFAST          Patient Phone Number: [phone] (home)     Last appt: 5/5/2022   Next appt: Visit date not found    Last OARRS: No flowsheet data found.   PDMP Monitoring:    Last PDMP Dillon marie Reviewed [facility]):  Review User Review Instant Review Result          Preferred Pharmacy:   [facility] [id number] - [place], [address] [place] [address] [phone] Unknown y [phone]  1 [facility] [place] [address]  Phone: [phone] Fax: [phone]

## 2023-06-28 ENCOUNTER — PATIENT MESSAGE (OUTPATIENT)
Dept: FAMILY MEDICINE CLINIC | Age: 38
End: 2023-06-28

## 2023-06-28 RX ORDER — OMEPRAZOLE 40 MG/1
CAPSULE, DELAYED RELEASE ORAL
Qty: 30 CAPSULE | Refills: 5 | OUTPATIENT
Start: 2023-06-28

## 2023-06-29 RX ORDER — OMEPRAZOLE 40 MG/1
40 CAPSULE, DELAYED RELEASE ORAL
Qty: 30 CAPSULE | Refills: 1 | Status: SHIPPED | OUTPATIENT
Start: 2023-06-29

## 2023-08-28 RX ORDER — OMEPRAZOLE 40 MG/1
CAPSULE, DELAYED RELEASE ORAL
Qty: 30 CAPSULE | Refills: 1 | OUTPATIENT
Start: 2023-08-28

## 2023-08-28 NOTE — TELEPHONE ENCOUNTER
Medication:   Requested Prescriptions     Pending Prescriptions Disp Refills    omeprazole (PRILOSEC) 40 MG delayed release capsule [Pharmacy Med Name: OMEPRAZOLE DR 40 MG CAPSULE] 30 capsule 1     Sig: TAKE ONE CAPSULE BY MOUTH EVERY MORNING BEFORE BREAKFAST        Patient Phone Number: [phone] (home)     Last appt: 5/5/2022   Next appt: Visit date not found    Last OARRS: No flowsheet data found.   PDMP Monitoring:    Last PDMP Selestino Eye as Reviewed [facility]):  Review User Review Instant Review Result          Preferred Pharmacy:   Marti Coreas [phone] [address] [phone] Jeannette Nielson [phone]  1 [facility]  [address]  Phone: [phone] Fax: [phone]

## 2023-08-29 RX ORDER — OMEPRAZOLE 40 MG/1
CAPSULE, DELAYED RELEASE ORAL
Qty: 30 CAPSULE | Refills: 1 | OUTPATIENT
Start: 2023-08-29

## 2023-08-29 NOTE — TELEPHONE ENCOUNTER
Medication:   Requested Prescriptions     Pending Prescriptions Disp Refills    omeprazole (PRILOSEC) 40 MG delayed release capsule [Pharmacy Med Name: OMEPRAZOLE DR 40 MG CAPSULE] 30 capsule 1     Sig: TAKE ONE CAPSULE BY MOUTH EVERY MORNING BEFORE BREAKFAST          Patient Phone Number: [phone] (home)     Last appt: 5/5/2022   Next appt: Visit date not found    Last OARRS: No flowsheet data found.   PDMP Monitoring:    Last PDMP Lukasz Velasco as Reviewed [facility]):  Review User Review Instant Review Result          Preferred Pharmacy:   Mamta Bowens [phone] [address] [phone] Justyna Her [phone]  1 [facility]  [address]  Phone: [phone] Fax: [phone]

## 2023-09-10 SDOH — ECONOMIC STABILITY: HOUSING INSECURITY
IN THE LAST 12 MONTHS, WAS THERE A TIME WHEN YOU DID NOT HAVE A STEADY PLACE TO SLEEP OR SLEPT IN A SHELTER (INCLUDING NOW)?: NO

## 2023-09-10 SDOH — ECONOMIC STABILITY: FOOD INSECURITY: WITHIN THE PAST 12 MONTHS, THE FOOD YOU BOUGHT JUST DIDN'T LAST AND YOU DIDN'T HAVE MONEY TO GET MORE.: NEVER TRUE

## 2023-09-10 SDOH — ECONOMIC STABILITY: FOOD INSECURITY: WITHIN THE PAST 12 MONTHS, YOU WORRIED THAT YOUR FOOD WOULD RUN OUT BEFORE YOU GOT MONEY TO BUY MORE.: NEVER TRUE

## 2023-09-10 SDOH — ECONOMIC STABILITY: INCOME INSECURITY: HOW HARD IS IT FOR YOU TO PAY FOR THE VERY BASICS LIKE FOOD, HOUSING, MEDICAL CARE, AND HEATING?: NOT VERY HARD

## 2023-09-10 SDOH — ECONOMIC STABILITY: TRANSPORTATION INSECURITY
IN THE PAST 12 MONTHS, HAS LACK OF TRANSPORTATION KEPT YOU FROM MEETINGS, WORK, OR FROM GETTING THINGS NEEDED FOR DAILY LIVING?: NO

## 2023-09-10 ASSESSMENT — PATIENT HEALTH QUESTIONNAIRE - PHQ9
SUM OF ALL RESPONSES TO PHQ QUESTIONS 1-9: 4
10. IF YOU CHECKED OFF ANY PROBLEMS, HOW DIFFICULT HAVE THESE PROBLEMS MADE IT FOR YOU TO DO YOUR WORK, TAKE CARE OF THINGS AT HOME, OR GET ALONG WITH OTHER PEOPLE: NOT DIFFICULT AT ALL
9. THOUGHTS THAT YOU WOULD BE BETTER OFF DEAD, OR OF HURTING YOURSELF: NOT AT ALL
5. POOR APPETITE OR OVEREATING: SEVERAL DAYS
8. MOVING OR SPEAKING SO SLOWLY THAT OTHER PEOPLE COULD HAVE NOTICED. OR THE OPPOSITE, BEING SO FIGETY OR RESTLESS THAT YOU HAVE BEEN MOVING AROUND A LOT MORE THAN USUAL: 0
8. MOVING OR SPEAKING SO SLOWLY THAT OTHER PEOPLE COULD HAVE NOTICED. OR THE OPPOSITE - BEING SO FIDGETY OR RESTLESS THAT YOU HAVE BEEN MOVING AROUND A LOT MORE THAN USUAL: NOT AT ALL
SUM OF ALL RESPONSES TO PHQ QUESTIONS 1-9: 4
7. TROUBLE CONCENTRATING ON THINGS, SUCH AS READING THE NEWSPAPER OR WATCHING TELEVISION: 0
2. FEELING DOWN, DEPRESSED OR HOPELESS: 0
4. FEELING TIRED OR HAVING LITTLE ENERGY: 1
10. IF YOU CHECKED OFF ANY PROBLEMS, HOW DIFFICULT HAVE THESE PROBLEMS MADE IT FOR YOU TO DO YOUR WORK, TAKE CARE OF THINGS AT HOME, OR GET ALONG WITH OTHER PEOPLE: 0
3. TROUBLE FALLING OR STAYING ASLEEP: SEVERAL DAYS
3. TROUBLE FALLING OR STAYING ASLEEP: 1
2. FEELING DOWN, DEPRESSED OR HOPELESS: NOT AT ALL
4. FEELING TIRED OR HAVING LITTLE ENERGY: SEVERAL DAYS
1. LITTLE INTEREST OR PLEASURE IN DOING THINGS: SEVERAL DAYS
SUM OF ALL RESPONSES TO PHQ QUESTIONS 1-9: 4
6. FEELING BAD ABOUT YOURSELF - OR THAT YOU ARE A FAILURE OR HAVE LET YOURSELF OR YOUR FAMILY DOWN: NOT AT ALL
7. TROUBLE CONCENTRATING ON THINGS, SUCH AS READING THE NEWSPAPER OR WATCHING TELEVISION: NOT AT ALL
SUM OF ALL RESPONSES TO PHQ QUESTIONS 1-9: 4
SUM OF ALL RESPONSES TO PHQ9 QUESTIONS 1 & 2: 1
SUM OF ALL RESPONSES TO PHQ QUESTIONS 1-9: 4
5. POOR APPETITE OR OVEREATING: 1
9. THOUGHTS THAT YOU WOULD BE BETTER OFF DEAD, OR OF HURTING YOURSELF: 0
6. FEELING BAD ABOUT YOURSELF - OR THAT YOU ARE A FAILURE OR HAVE LET YOURSELF OR YOUR FAMILY DOWN: 0
1. LITTLE INTEREST OR PLEASURE IN DOING THINGS: 1

## 2023-09-11 NOTE — PROGRESS NOTES
discussed and ordered if patient agreed  Encouraged healthy diet   Encouraged regular exercise and maintaining a healthy weight  Discussed living will/healthcare POA and encouraged to get if doesn't have. \"To Do List\" given to patient in AVS    Anxiety  -Stable, continue current medications. Gastroesophageal reflux disease without esophagitis  -     Comprehensive Metabolic Panel; Future  -     Magnesium; Future  -Stable, continue current medications. Elevated BP without diagnosis of hypertension  Monitor and send readings in a month    Other orders  -     omeprazole (PRILOSEC) 40 MG delayed release capsule; Take 1 capsule by mouth every morning (before breakfast)  -     sertraline (ZOLOFT) 50 MG tablet; TAKE 1 AND 1/2 TABLET BY MOUTH DAILY            Return in about 1 year (around 9/12/2024) for Well, 30 min.          Portions of Note per  Obed Lui CMA AAMA with corrections and edits per Maryanne Spear MD.  I agree with entirety of note and was present and performed history and physical.  I also confirm that the note above accurately reflects all work, treatment, procedures, and medical decision making performed by me, Maryanne Spear MD

## 2023-09-12 ENCOUNTER — OFFICE VISIT (OUTPATIENT)
Dept: FAMILY MEDICINE CLINIC | Age: 38
End: 2023-09-12
Payer: COMMERCIAL

## 2023-09-12 VITALS
HEIGHT: 69 IN | SYSTOLIC BLOOD PRESSURE: 140 MMHG | WEIGHT: 203.2 LBS | DIASTOLIC BLOOD PRESSURE: 100 MMHG | TEMPERATURE: 98.3 F | HEART RATE: 81 BPM | OXYGEN SATURATION: 98 % | BODY MASS INDEX: 30.1 KG/M2

## 2023-09-12 DIAGNOSIS — Z00.00 WELL ADULT EXAM: Primary | ICD-10-CM

## 2023-09-12 DIAGNOSIS — F41.9 ANXIETY: ICD-10-CM

## 2023-09-12 DIAGNOSIS — K21.9 GASTROESOPHAGEAL REFLUX DISEASE WITHOUT ESOPHAGITIS: ICD-10-CM

## 2023-09-12 DIAGNOSIS — R03.0 ELEVATED BP WITHOUT DIAGNOSIS OF HYPERTENSION: ICD-10-CM

## 2023-09-12 PROCEDURE — 99396 PREV VISIT EST AGE 40-64: CPT | Performed by: FAMILY MEDICINE

## 2023-09-12 RX ORDER — OMEPRAZOLE 40 MG/1
40 CAPSULE, DELAYED RELEASE ORAL
Qty: 30 CAPSULE | Refills: 12 | Status: SHIPPED | OUTPATIENT
Start: 2023-09-12

## 2023-09-12 NOTE — PATIENT INSTRUCTIONS
--Make appointment to see the eye doctor     --Make appointment to see the dentist     --Bring in copy of living will and healthcare power of  for your chart. --Check with the pharmacy about getting the Tdap (tetanus, diptheria and pertussis) vaccine. --Make sure you get your flu shot this fall. If you are over 65 look for the \"high dose\" flu shot for better protection. --Get your Covid vaccine this fall. Monitor blood pressure 2-3 times a week and drop off readings for review in 1 month. Goal BP is <140/80 for many people, good control is <130/80    Need a new cuff?  Check this website to make sure your BP cuff has been validated for accuracy:    www.validatebp.org

## 2024-02-12 RX ORDER — FLUTICASONE PROPIONATE 50 MCG
SPRAY, SUSPENSION (ML) NASAL
Qty: 1 EACH | Refills: 5 | Status: SHIPPED | OUTPATIENT
Start: 2024-02-12

## 2024-02-12 NOTE — TELEPHONE ENCOUNTER
Medication:   Requested Prescriptions     Pending Prescriptions Disp Refills    fluticasone (FLONASE) 50 MCG/ACT nasal spray [Pharmacy Med Name: FLUTICASONE PROP 50 MCG SPRAY]       Sig: SPRAY TWO SPRAYS IN EACH NOSTRIL ONCE DAILY          Patient Phone Number: [phone] (home)     Last appt: 9/12/2023   Next appt: Visit date not found    Last OARRS:        No data to display              PDMP Monitoring:    Last PDMP Bret as Reviewed ([place]):  Review User Review Instant Review Result          Preferred Pharmacy:   [facility] [id number] - [place], [place] - [address] [phone] - F [phone]  [address] [place] [address]  Phone: [phone] Fax: [phone]

## 2024-09-09 LAB
HEPATITIS B SURFACE ANTIBODY: 22.5 (ref 10–?)
Lab: >300 (ref 16.4–?)
Lab: NEGATIVE
MUMPS ABS, IGG: >300 (ref 10.9–?)
RUBV IGG SER QL: 47.9 (ref 10–?)
VARICELLA-ZOSTER AB, IGG: 2744 (ref 165–?)

## 2024-09-18 RX ORDER — OMEPRAZOLE 40 MG/1
40 CAPSULE, DELAYED RELEASE ORAL
Qty: 30 CAPSULE | Refills: 12 | OUTPATIENT
Start: 2024-09-18

## 2024-09-24 RX ORDER — OMEPRAZOLE 40 MG/1
40 CAPSULE, DELAYED RELEASE ORAL
Qty: 30 CAPSULE | Refills: 12 | OUTPATIENT
Start: 2024-09-24

## 2024-09-24 NOTE — TELEPHONE ENCOUNTER
Medication:   Requested Prescriptions     Pending Prescriptions Disp Refills    omeprazole (PRILOSEC) 40 MG delayed release capsule 30 capsule 12     Sig: Take 1 capsule by mouth every morning (before breakfast)    sertraline (ZOLOFT) 50 MG tablet 45 tablet 12     Sig: TAKE 1 AND 1/2 TABLET BY MOUTH DAILY      Patient sent my chart message below:    I submitted a request to refill my medications. I know those meds require a visit periodically but it looks like it’ll be a few weeks before I’m able to get an appointment. Can I get refilled until I am able to get an appointment? I can go ahead and schedule one. My only concern is, with my current work situation I don’t have insurance. What can be worked out as far as billing for a visit without insurance?     Thanks,     Efe    Patient Phone Number: [phone] (home)     Last appt: 9/12/2023   Next appt: Visit date not found    Last OARRS:        No data to display              PDMP Monitoring:    Last PDMP Bret as Reviewed ([place]):  Review User Review Instant Review Result          Preferred Pharmacy:   [facility] [id number] - [place] - [address] [place] [address] [phone] - F [phone]  [address] [place] [address]  Phone: [phone] Fax: [phone]

## 2024-09-27 RX ORDER — OMEPRAZOLE 40 MG/1
40 CAPSULE, DELAYED RELEASE ORAL
Qty: 30 CAPSULE | Refills: 0 | Status: SHIPPED | OUTPATIENT
Start: 2024-09-27

## 2024-10-22 NOTE — PROGRESS NOTES
Here for annual physical.    Dental: {UP-TO-DATE/ORDERED:240801234}  Eye: {UP-TO-DATE/ORDERED:394609944}    Colonoscopy: N/A-    Seatbelt: {RARELY/SOMETIMES/OFTEN/ALWAYS:8992711998}    Exercise:  {desc; exercise:70517}, {ACTIVITIES; EXERCISE:29047}  Do you eat balanced/healthy meals regularly? {YES NO:24809}    Living Will and/or Healthcare POA: {YES / NO:},   {LIVING WILL SCREENIN}        9/10/2023     4:08 PM 1/15/2021    10:05 AM 10/10/2017    11:27 AM   PHQ Scores   PHQ2 Score 1 2 2   PHQ9 Score 4 2 2       HM reviewed with pt    Patient's medications, allergies, past medical, surgical, social and family histories were reviewed and updated in the EHR as appropriate.    There were no vitals filed for this visit.  Wt Readings from Last 3 Encounters:   23 92.2 kg (203 lb 3.2 oz)   22 89.7 kg (197 lb 12.8 oz)   21 86.6 kg (191 lb)     There is no height or weight on file to calculate BMI.      GENERAL Alert and oriented x 4 NAD, {gyn gen appearance:236676961}, well hydrated, well developed.  NECK:supple and non tender without mass, no thyromegaly or thyroid nodules, no cervical lymphadenopathy  HEENT: TM clear bilaterally  LUNG:clear to auscultation bilaterally with normal respiratory effort  CV: Normal heart sounds, regular rate and rhythm without murmurs  EXTREMETY: no loss of hair, no edema, normal pedal pulses bilaterally  NEURO: CN grossly intact, moving all extremities equally, no gross deficits          ASSESSMENT AND PLAN:       There are no diagnoses linked to this encounter.        No follow-ups on file.         Portions of Note per  Yoselin Pop CMA AAMA with corrections and edits per Estelle Etienne MD.  I agree with entirety of note and was present and performed history and physical.  I also confirm that the note above accurately reflects all work, treatment, procedures, and medical decision making performed by me, Estelle Etienne MD

## 2024-10-23 ENCOUNTER — TELEPHONE (OUTPATIENT)
Dept: FAMILY MEDICINE CLINIC | Age: 39
End: 2024-10-23

## 2024-10-23 ENCOUNTER — OFFICE VISIT (OUTPATIENT)
Dept: FAMILY MEDICINE CLINIC | Age: 39
End: 2024-10-23

## 2024-10-23 VITALS
BODY MASS INDEX: 29.86 KG/M2 | DIASTOLIC BLOOD PRESSURE: 100 MMHG | OXYGEN SATURATION: 98 % | SYSTOLIC BLOOD PRESSURE: 150 MMHG | HEIGHT: 70 IN | HEART RATE: 89 BPM | TEMPERATURE: 98.4 F | WEIGHT: 208.6 LBS

## 2024-10-23 DIAGNOSIS — Z87.891 PERSONAL HISTORY OF TOBACCO USE, PRESENTING HAZARDS TO HEALTH: ICD-10-CM

## 2024-10-23 DIAGNOSIS — K21.9 GASTROESOPHAGEAL REFLUX DISEASE WITHOUT ESOPHAGITIS: ICD-10-CM

## 2024-10-23 DIAGNOSIS — F41.9 ANXIETY: ICD-10-CM

## 2024-10-23 DIAGNOSIS — R03.0 ELEVATED BP WITHOUT DIAGNOSIS OF HYPERTENSION: ICD-10-CM

## 2024-10-23 DIAGNOSIS — Z00.00 WELL ADULT EXAM: Primary | ICD-10-CM

## 2024-10-23 DIAGNOSIS — M54.32 SCIATICA OF LEFT SIDE: ICD-10-CM

## 2024-10-23 RX ORDER — FLUTICASONE PROPIONATE 50 MCG
2 SPRAY, SUSPENSION (ML) NASAL DAILY
Qty: 1 EACH | Refills: 5 | Status: SHIPPED | OUTPATIENT
Start: 2024-10-23

## 2024-10-23 RX ORDER — OMEPRAZOLE 40 MG/1
40 CAPSULE, DELAYED RELEASE ORAL
Qty: 30 CAPSULE | Refills: 12 | Status: SHIPPED | OUTPATIENT
Start: 2024-10-23

## 2024-10-23 SDOH — ECONOMIC STABILITY: INCOME INSECURITY: HOW HARD IS IT FOR YOU TO PAY FOR THE VERY BASICS LIKE FOOD, HOUSING, MEDICAL CARE, AND HEATING?: SOMEWHAT HARD

## 2024-10-23 SDOH — ECONOMIC STABILITY: FOOD INSECURITY: WITHIN THE PAST 12 MONTHS, THE FOOD YOU BOUGHT JUST DIDN'T LAST AND YOU DIDN'T HAVE MONEY TO GET MORE.: NEVER TRUE

## 2024-10-23 SDOH — ECONOMIC STABILITY: FOOD INSECURITY: WITHIN THE PAST 12 MONTHS, YOU WORRIED THAT YOUR FOOD WOULD RUN OUT BEFORE YOU GOT MONEY TO BUY MORE.: NEVER TRUE

## 2024-10-23 ASSESSMENT — PATIENT HEALTH QUESTIONNAIRE - PHQ9
SUM OF ALL RESPONSES TO PHQ QUESTIONS 1-9: 1
2. FEELING DOWN, DEPRESSED OR HOPELESS: NOT AT ALL
SUM OF ALL RESPONSES TO PHQ9 QUESTIONS 1 & 2: 1
SUM OF ALL RESPONSES TO PHQ QUESTIONS 1-9: 1
1. LITTLE INTEREST OR PLEASURE IN DOING THINGS: SEVERAL DAYS

## 2024-10-23 NOTE — PATIENT INSTRUCTIONS
[facility]  What they offer: $1 for $1 matching for families receiving SNAP/food stamps when spent on “healthy” food.  The match money can be used to purchase fruits and vegetables so adds more healthy food choices for SNAP beneficiaries.   Contact: https://ecoInsight/locations/     SNAP (formerly [employer] [place])   What they offer: SNAP is used like cash to buy eligible food items from authorized retailers.  Apply for benefits online: [email or web address].[email or web address]     Apply for benefits by phone or in-person by visiting your local Job and Family Services. Locate your [facility]’s Job and family services by searching the directory at [email or web address]_Directory.stm           Meals on Wheels   What they offer: Meals on Wheels is a program that delivers meals to individuals who have no reliable means for maintaining a healthy diet.   To Apply:   Ages 18-59:  Apply online: [email or web address]/  Apply by phone: [phone]    Ages 60+:   [place] on Aging: [phone]      [facility]  What they offer: Serves neighbors in [facility] through its network of Conferences (groups of volunteers based at a [employer]) for assistance with needs such as food, clothing, furniture, rent, utilities, or beds.  Website: [email or web address]://[email or web address].[email or web address]/get-help/   Phone: [phone]

## 2024-10-23 NOTE — TELEPHONE ENCOUNTER
Had blood work done through work at [employer] - see if can get results

## 2024-10-23 NOTE — PROGRESS NOTES
Efe Chan is a 38 yo M who presents to the office for annual physical and f/u for anxiety.    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    Exercise: occasionally walk around 30 min twice weekly (work related) - around 1 month off of night shift, now working evenings; fatigue limiting  Diet: Eating at home more, continuing to eat mostly fast food d/t time constraints. Working on getting more fruits and vegetables that don't cause GI distress    Living Will and/or Healthcare POA: Yes, up-to-date copy on file    Pt completed blood work for work at [employer] [place] around 1 month ago. He plans to get his flu vaccine at work. He is not interested in any other vaccinations today.        10/23/2024    11:18 AM 9/10/2023     4:08 PM 1/15/2021    10:05 AM 10/10/2017    11:27 AM   PHQ Scores   PHQ2 Score 1 1 2 2   PHQ9 Score 1 4 2 2     Anxiety  Pt reports his anxiety is well-controlled when take sertraline, which he takes nightly (d/t mild sedation previously) but occasionally forgets. He is happy with how things are currently managed.    Esophageal reflux  Pt is taking omeprazole every morning. If he ever forgets, he notes he will definitely experience heartburn. He is avoiding food trigger such as raw onions, lunch meat.    Sciatica, L  Pt has history of sciatica that has worsened in the last month. He experiences shooting pain in his L glute and calf, and to a lesser degree in his hamstring and ankle. He also has tingling and a sensation of pressure in his L calf and ankle

## 2024-10-25 ENCOUNTER — TELEPHONE (OUTPATIENT)
Dept: FAMILY MEDICINE CLINIC | Age: 39
End: 2024-10-25

## 2024-10-25 DIAGNOSIS — Z13.6 SCREENING FOR HEART DISEASE: ICD-10-CM

## 2024-10-25 DIAGNOSIS — Z00.00 WELL ADULT EXAM: Primary | ICD-10-CM

## 2024-10-25 DIAGNOSIS — K21.9 GASTROESOPHAGEAL REFLUX DISEASE WITHOUT ESOPHAGITIS: ICD-10-CM

## 2024-12-11 NOTE — PROGRESS NOTES
Moderate malnutrition ([facility] [address] [E44.0] 02/28/2020    Hyponatremia [E87.1] 02/27/2020       Anorexia with nausea and vomiting  Moderate malnutrition  - planned for EGD on weds  - advance diet per GI recs  - gentle IVF hydration  - antiemetics PRN  - protonix  - GI following     Transaminitis  Mild elevation in lipase / amylase  Mild periorbital liver edema and nonspecific L hepatic bile duct dilatation on MRCP. Questionable hepatitis, atrophy  - planned for ERCP on weds  - trend LFTs  -       Hyponatremia; likely hypovolemic.   Improving  - nephro following  - trend Na closely  - gentle IVFs     Diet: DIET CLEAR LIQUID;  Dietary Nutrition Supplements: Clear Liquid Oral Supplement  Code Status: Full Code    Dispo - Shaylee Hutchison MD Yes

## 2025-03-13 NOTE — TELEPHONE ENCOUNTER
Tell pt got labs done at his work, they just did immunity titers.    He needs to get add'l regular labs done    Lipid, cmp, Mg  Dx\" GERD, screen CAD, well     None

## 2025-04-10 ENCOUNTER — PATIENT MESSAGE (OUTPATIENT)
Dept: FAMILY MEDICINE CLINIC | Age: 40
End: 2025-04-10

## 2025-04-17 ENCOUNTER — OFFICE VISIT (OUTPATIENT)
Dept: FAMILY MEDICINE CLINIC | Age: 40
End: 2025-04-17

## 2025-04-17 VITALS
HEART RATE: 96 BPM | WEIGHT: 206 LBS | OXYGEN SATURATION: 96 % | SYSTOLIC BLOOD PRESSURE: 158 MMHG | DIASTOLIC BLOOD PRESSURE: 90 MMHG | BODY MASS INDEX: 29.77 KG/M2 | TEMPERATURE: 98.6 F

## 2025-04-17 DIAGNOSIS — I10 PRIMARY HYPERTENSION: Primary | ICD-10-CM

## 2025-04-17 PROCEDURE — 99214 OFFICE O/P EST MOD 30 MIN: CPT | Performed by: FAMILY MEDICINE

## 2025-04-17 PROCEDURE — 3080F DIAST BP >= 90 MM HG: CPT | Performed by: FAMILY MEDICINE

## 2025-04-17 PROCEDURE — 3077F SYST BP >= 140 MM HG: CPT | Performed by: FAMILY MEDICINE

## 2025-04-17 RX ORDER — VALSARTAN AND HYDROCHLOROTHIAZIDE 80; 12.5 MG/1; MG/1
1 TABLET, FILM COATED ORAL DAILY
Qty: 30 TABLET | Refills: 3 | Status: SHIPPED | OUTPATIENT
Start: 2025-04-17

## 2025-04-17 SDOH — ECONOMIC STABILITY: FOOD INSECURITY: WITHIN THE PAST 12 MONTHS, YOU WORRIED THAT YOUR FOOD WOULD RUN OUT BEFORE YOU GOT MONEY TO BUY MORE.: NEVER TRUE

## 2025-04-17 SDOH — ECONOMIC STABILITY: FOOD INSECURITY: WITHIN THE PAST 12 MONTHS, THE FOOD YOU BOUGHT JUST DIDN'T LAST AND YOU DIDN'T HAVE MONEY TO GET MORE.: NEVER TRUE

## 2025-04-17 ASSESSMENT — PATIENT HEALTH QUESTIONNAIRE - PHQ9
7. TROUBLE CONCENTRATING ON THINGS, SUCH AS READING THE NEWSPAPER OR WATCHING TELEVISION: NOT AT ALL
4. FEELING TIRED OR HAVING LITTLE ENERGY: NEARLY EVERY DAY
SUM OF ALL RESPONSES TO PHQ QUESTIONS 1-9: 6
SUM OF ALL RESPONSES TO PHQ QUESTIONS 1-9: 6
10. IF YOU CHECKED OFF ANY PROBLEMS, HOW DIFFICULT HAVE THESE PROBLEMS MADE IT FOR YOU TO DO YOUR WORK, TAKE CARE OF THINGS AT HOME, OR GET ALONG WITH OTHER PEOPLE: NOT DIFFICULT AT ALL
6. FEELING BAD ABOUT YOURSELF - OR THAT YOU ARE A FAILURE OR HAVE LET YOURSELF OR YOUR FAMILY DOWN: NOT AT ALL
SUM OF ALL RESPONSES TO PHQ QUESTIONS 1-9: 6
2. FEELING DOWN, DEPRESSED OR HOPELESS: NOT AT ALL
9. THOUGHTS THAT YOU WOULD BE BETTER OFF DEAD, OR OF HURTING YOURSELF: NOT AT ALL
1. LITTLE INTEREST OR PLEASURE IN DOING THINGS: NOT AT ALL
3. TROUBLE FALLING OR STAYING ASLEEP: NEARLY EVERY DAY
8. MOVING OR SPEAKING SO SLOWLY THAT OTHER PEOPLE COULD HAVE NOTICED. OR THE OPPOSITE, BEING SO FIGETY OR RESTLESS THAT YOU HAVE BEEN MOVING AROUND A LOT MORE THAN USUAL: NOT AT ALL
SUM OF ALL RESPONSES TO PHQ QUESTIONS 1-9: 6
5. POOR APPETITE OR OVEREATING: NOT AT ALL

## 2025-04-17 NOTE — PATIENT INSTRUCTIONS
Monitor blood pressure 2-3 times a week and drop off readings for review at next visit    Goal BP is <130/80 for many people, good control is <120/80    Need a new cuff? Check this website to make sure your BP cuff has been validated for accuracy:    www.validatebp.org

## 2025-04-17 NOTE — PROGRESS NOTES
Chief Complaint   Patient presents with    Follow-up     Blood pressure     Here to f/u BP. Was high when here in fall. Pt states got a cuff but doesn't think works right and hasn't been monitoring.   Was high at recent work physical and needs letter filled out ok to work.         Body mass index is 29.77 kg/m².  Today's PHQ:        4/17/2025     8:34 AM 10/23/2024    11:18 AM 9/10/2023     4:08 PM 1/15/2021    10:05 AM 10/10/2017    11:27 AM   PHQ Scores   PHQ2 Score 0 1 1 2 2   PHQ9 Score 6 1 4 2 2     Interpretation of Total Score Depression Severity: 1-4 = Minimal depression, 5-9 = Mild depression, 10-14 = Moderate depression, 15-19 = Moderately severe depression, 20-27 = Severe depression   Vitals:    04/17/25 0833   BP: (!) 158/90   Pulse: 96   Temp: 98.6 °F (37 °C)   TempSrc: Temporal   SpO2: 96%   Weight: 93.4 kg (206 lb)     Wt Readings from Last 3 Encounters:   04/17/25 93.4 kg (206 lb)   10/23/24 94.6 kg (208 lb 9.6 oz)   09/12/23 92.2 kg (203 lb 3.2 oz)         4/17/2025     8:34 AM 10/23/2024    11:18 AM 9/10/2023     4:08 PM 1/15/2021    10:05 AM 10/10/2017    11:27 AM   PHQ Scores   PHQ2 Score 0 1 1 2 2   PHQ9 Score 6 1 4 2 2       Interpretation of Total Score Depression Severity: 1-4 = Minimal depression, 5-9 = Mild depression, 10-14 = Moderate depression, 15-19 = Moderately severe depression, 20-27 = Severe depression       GENERAL:Alert and oriented x 4 NAD, affect appropriate and overweight, well hydrated, well developed.  LUNG:clear to auscultation bilaterally with normal respiratory effort  CV: Normal heart sounds, regular rate and rhythm without murmurs  EXTREMETY:  no edema          ASSESSMENT AND PLAN:       Efe was seen today for follow-up.    Diagnoses and all orders for this visit:    Primary hypertension  Has been high over a year  Pt feels due to his work schedule and lifestyle  Discussed we can stop if BP improves once life settles but has been high for some time and needs meds  Check

## (undated) DEVICE — GOWN SIRUS NONREIN XL W/TWL: Brand: MEDLINE INDUSTRIES, INC.

## (undated) DEVICE — SYRINGE MED 10ML TRNSLUC BRL PLUNG BLK MRK POLYPR CTRL

## (undated) DEVICE — INTENDED FOR TISSUE SEPARATION, AND OTHER PROCEDURES THAT REQUIRE A SHARP SURGICAL BLADE TO PUNCTURE OR CUT.: Brand: BARD-PARKER ® STAINLESS STEEL BLADES

## (undated) DEVICE — DRAPE ADOLESCENT  LAPAROTOMY

## (undated) DEVICE — SET VLV 3 PC AWS DISPOSABLE GRDIAN SCOPEVALET

## (undated) DEVICE — ADHESIVE SKIN CLSR 0.7ML TOP DERMBND ADV

## (undated) DEVICE — LAPAROSCOPY PK

## (undated) DEVICE — MOUTHPIECE ENDOSCP L CTRL OPN AND SIDE PORTS DISP

## (undated) DEVICE — BLADE CLIPPER GEN PURP NS

## (undated) DEVICE — DRAPE,LAP,CHOLE,W/TROUGHS,STERILE: Brand: MEDLINE

## (undated) DEVICE — Device: Brand: BALLOON3

## (undated) DEVICE — 35 ML SYRINGE LUER-LOCK TIP: Brand: MONOJECT

## (undated) DEVICE — SUTURE MCRYL SZ 4-0 L27IN ABSRB UD L19MM PS-2 1/2 CIR PRIM Y426H

## (undated) DEVICE — COVER LT HNDL BLU PLAS

## (undated) DEVICE — TROCAR SLEEVE: Brand: KII ® LOW PROFILE SLEEVE

## (undated) DEVICE — GLOVE SURG SZ 6.5 L11.2IN FNGR THK9.8MIL STRW LTX POLYMER

## (undated) DEVICE — BLADE ES ELASTOMERIC COAT INSUL DURABLE BEND UPTO 90DEG

## (undated) DEVICE — GOWN AURORA NONREINF LG: Brand: MEDLINE INDUSTRIES, INC.

## (undated) DEVICE — FORCEPS BX L240CM WRK CHN 2.8MM STD CAP W/ NDL MIC MESH

## (undated) DEVICE — 60 ML SYRINGE,REGULAR TIP: Brand: MONOJECT

## (undated) DEVICE — DRAPE C ARM UNIV W41XL74IN CLR PLAS XR VELC CLSR POLY STRP

## (undated) DEVICE — CHLORAPREP 26ML ORANGE

## (undated) DEVICE — CONTROL SYRINGE LUER-LOCK TIP: Brand: MONOJECT

## (undated) DEVICE — SOLUTION IV IRRIG POUR BRL 0.9% SODIUM CHL 2F7124

## (undated) DEVICE — TRAY PREP DRY W/ PREM GLV 2 APPL 6 SPNG 2 UNDPD 1 OVERWRAP

## (undated) DEVICE — SYSTEM SKIN CLSR 22CM DERMBND PRINEO

## (undated) DEVICE — BW-412T DISP COMBO CLEANING BRUSH: Brand: SINGLE USE COMBINATION CLEANING BRUSH

## (undated) DEVICE — MAJOR SET UP PK

## (undated) DEVICE — SHEET,DRAPE,53X77,STERILE: Brand: MEDLINE

## (undated) DEVICE — SUTURE SZ 0 27IN 5/8 CIR UR-6  TAPER PT VIOLET ABSRB VICRYL J603H

## (undated) DEVICE — Device

## (undated) DEVICE — INDICATED FOR USE DURING OPEN AND LAPAROSCOPIC CHOLECYSTECTOMY PROCEDURES TO INJECT RADIOPAQUE MEDIA THROUGH THE CYSTIC DUCT INTO THE BILIARY TREE.: Brand: AEROSTAT®

## (undated) DEVICE — PROCEDURE KIT ENDOSCP CUST

## (undated) DEVICE — CORD ES L10FT MPLR LAP

## (undated) DEVICE — HYPODERMIC SAFETY NEEDLE: Brand: MAGELLAN

## (undated) DEVICE — STERILE POLYISOPRENE POWDER-FREE SURGICAL GLOVES: Brand: PROTEXIS

## (undated) DEVICE — NEEDLE HYPO 22GA L1.5IN BLK POLYPR HUB S STL REG BVL STR

## (undated) DEVICE — GOWN,SIRUS,POLYRNF,BRTHSLV,XL,30/CS: Brand: MEDLINE

## (undated) DEVICE — SOLUTION IV IRRIG WATER 500ML POUR BRL ST 2F7113

## (undated) DEVICE — LAPAROSCOPIC TROCAR SLEEVE/SINGLE USE: Brand: KII® LOW PROFILE OPTICAL ACCESS SYSTEM

## (undated) DEVICE — APPLIER CLP M/L SHFT DIA5MM 15 LIG LIGAMAX 5

## (undated) DEVICE — GOWN SIRUS NONREIN LG W/TWL: Brand: MEDLINE INDUSTRIES, INC.

## (undated) DEVICE — MERCY FAIRFIELD TURNOVER KIT: Brand: MEDLINE INDUSTRIES, INC.